# Patient Record
Sex: FEMALE | Race: BLACK OR AFRICAN AMERICAN | Employment: OTHER | URBAN - METROPOLITAN AREA
[De-identification: names, ages, dates, MRNs, and addresses within clinical notes are randomized per-mention and may not be internally consistent; named-entity substitution may affect disease eponyms.]

---

## 2017-02-16 ENCOUNTER — APPOINTMENT (OUTPATIENT)
Dept: GENERAL RADIOLOGY | Age: 47
End: 2017-02-16
Attending: EMERGENCY MEDICINE
Payer: MEDICAID

## 2017-02-16 ENCOUNTER — HOSPITAL ENCOUNTER (EMERGENCY)
Age: 47
Discharge: HOME | End: 2017-02-16
Attending: EMERGENCY MEDICINE
Payer: MEDICAID

## 2017-02-16 VITALS
DIASTOLIC BLOOD PRESSURE: 89 MMHG | TEMPERATURE: 98.7 F | HEART RATE: 82 BPM | SYSTOLIC BLOOD PRESSURE: 148 MMHG | OXYGEN SATURATION: 100 % | RESPIRATION RATE: 18 BRPM | BODY MASS INDEX: 50.84 KG/M2 | WEIGHT: 293 LBS

## 2017-02-16 DIAGNOSIS — A59.9 TRICHOMONAS INFECTION: ICD-10-CM

## 2017-02-16 DIAGNOSIS — J18.9 CAP (COMMUNITY ACQUIRED PNEUMONIA): Primary | ICD-10-CM

## 2017-02-16 LAB
ALBUMIN SERPL BCP-MCNC: 3.1 G/DL (ref 3.4–5)
ALBUMIN/GLOB SERPL: 0.7 {RATIO} (ref 0.8–1.7)
ALP SERPL-CCNC: 73 U/L (ref 45–117)
ALT SERPL-CCNC: 22 U/L (ref 13–56)
ANION GAP BLD CALC-SCNC: 9 MMOL/L (ref 3–18)
APPEARANCE UR: ABNORMAL
AST SERPL W P-5'-P-CCNC: 33 U/L (ref 15–37)
BACTERIA URNS QL MICRO: ABNORMAL /HPF
BASOPHILS # BLD AUTO: 0 K/UL (ref 0–0.06)
BASOPHILS # BLD: 0 % (ref 0–2)
BILIRUB SERPL-MCNC: 0.4 MG/DL (ref 0.2–1)
BILIRUB UR QL: NEGATIVE
BUN SERPL-MCNC: 20 MG/DL (ref 7–18)
BUN/CREAT SERPL: 10 (ref 12–20)
CALCIUM SERPL-MCNC: 8.4 MG/DL (ref 8.5–10.1)
CHLORIDE SERPL-SCNC: 103 MMOL/L (ref 100–108)
CO2 SERPL-SCNC: 26 MMOL/L (ref 21–32)
COLOR UR: ABNORMAL
CREAT SERPL-MCNC: 2.04 MG/DL (ref 0.6–1.3)
DIFFERENTIAL METHOD BLD: ABNORMAL
EOSINOPHIL # BLD: 0 K/UL (ref 0–0.4)
EOSINOPHIL NFR BLD: 1 % (ref 0–5)
EPITH CASTS URNS QL MICRO: ABNORMAL /LPF (ref 0–5)
ERYTHROCYTE [DISTWIDTH] IN BLOOD BY AUTOMATED COUNT: 16.4 % (ref 11.6–14.5)
GLOBULIN SER CALC-MCNC: 4.5 G/DL (ref 2–4)
GLUCOSE SERPL-MCNC: 102 MG/DL (ref 74–99)
GLUCOSE UR STRIP.AUTO-MCNC: NEGATIVE MG/DL
HCT VFR BLD AUTO: 31.5 % (ref 35–45)
HGB BLD-MCNC: 10.2 G/DL (ref 12–16)
HGB UR QL STRIP: ABNORMAL
KETONES UR QL STRIP.AUTO: ABNORMAL MG/DL
LEUKOCYTE ESTERASE UR QL STRIP.AUTO: ABNORMAL
LYMPHOCYTES # BLD AUTO: 28 % (ref 21–52)
LYMPHOCYTES # BLD: 1 K/UL (ref 0.9–3.6)
MCH RBC QN AUTO: 21.7 PG (ref 24–34)
MCHC RBC AUTO-ENTMCNC: 32.4 G/DL (ref 31–37)
MCV RBC AUTO: 67.2 FL (ref 74–97)
MONOCYTES # BLD: 0.4 K/UL (ref 0.05–1.2)
MONOCYTES NFR BLD AUTO: 12 % (ref 3–10)
NEUTS SEG # BLD: 2.1 K/UL (ref 1.8–8)
NEUTS SEG NFR BLD AUTO: 59 % (ref 40–73)
NITRITE UR QL STRIP.AUTO: NEGATIVE
PH UR STRIP: 5.5 [PH] (ref 5–8)
PLATELET # BLD AUTO: 185 K/UL (ref 135–420)
POTASSIUM SERPL-SCNC: 3.8 MMOL/L (ref 3.5–5.5)
PROT SERPL-MCNC: 7.6 G/DL (ref 6.4–8.2)
PROT UR STRIP-MCNC: 300 MG/DL
RBC # BLD AUTO: 4.69 M/UL (ref 4.2–5.3)
RBC #/AREA URNS HPF: ABNORMAL /HPF (ref 0–5)
SODIUM SERPL-SCNC: 138 MMOL/L (ref 136–145)
SP GR UR REFRACTOMETRY: 1.02 (ref 1–1.03)
TRICHOMONAS UR QL MICRO: ABNORMAL
UROBILINOGEN UR QL STRIP.AUTO: 0.2 EU/DL (ref 0.2–1)
WBC # BLD AUTO: 3.6 K/UL (ref 4.6–13.2)
WBC URNS QL MICRO: ABNORMAL /HPF (ref 0–4)

## 2017-02-16 PROCEDURE — 80053 COMPREHEN METABOLIC PANEL: CPT | Performed by: EMERGENCY MEDICINE

## 2017-02-16 PROCEDURE — 74011250636 HC RX REV CODE- 250/636: Performed by: EMERGENCY MEDICINE

## 2017-02-16 PROCEDURE — 71020 XR CHEST PA LAT: CPT

## 2017-02-16 PROCEDURE — 81001 URINALYSIS AUTO W/SCOPE: CPT | Performed by: EMERGENCY MEDICINE

## 2017-02-16 PROCEDURE — 74011250637 HC RX REV CODE- 250/637: Performed by: EMERGENCY MEDICINE

## 2017-02-16 PROCEDURE — 87086 URINE CULTURE/COLONY COUNT: CPT | Performed by: EMERGENCY MEDICINE

## 2017-02-16 PROCEDURE — 85025 COMPLETE CBC W/AUTO DIFF WBC: CPT | Performed by: EMERGENCY MEDICINE

## 2017-02-16 PROCEDURE — 99283 EMERGENCY DEPT VISIT LOW MDM: CPT

## 2017-02-16 RX ORDER — LEVOFLOXACIN 750 MG/1
750 TABLET ORAL DAILY
Qty: 5 TAB | Refills: 0 | Status: SHIPPED | OUTPATIENT
Start: 2017-02-16 | End: 2017-02-21

## 2017-02-16 RX ORDER — METRONIDAZOLE 250 MG/1
2000 TABLET ORAL ONCE
Status: COMPLETED | OUTPATIENT
Start: 2017-02-16 | End: 2017-02-16

## 2017-02-16 RX ORDER — VENLAFAXINE HYDROCHLORIDE 150 MG/1
CAPSULE, EXTENDED RELEASE ORAL DAILY
COMMUNITY

## 2017-02-16 RX ORDER — LEVOFLOXACIN 750 MG/1
750 TABLET ORAL
Status: COMPLETED | OUTPATIENT
Start: 2017-02-16 | End: 2017-02-16

## 2017-02-16 RX ADMIN — METRONIDAZOLE 2000 MG: 250 TABLET ORAL at 12:47

## 2017-02-16 RX ADMIN — SODIUM CHLORIDE 1000 ML: 900 INJECTION, SOLUTION INTRAVENOUS at 10:55

## 2017-02-16 RX ADMIN — LEVOFLOXACIN 750 MG: 750 TABLET, FILM COATED ORAL at 12:47

## 2017-02-16 NOTE — DISCHARGE INSTRUCTIONS
Pneumonia: Care Instructions  Your Care Instructions    Pneumonia is an infection of the lungs. Most cases are caused by infections from bacteria or viruses. Pneumonia may be mild or very severe. If it is caused by bacteria, you will be treated with antibiotics. It may take a few weeks to a few months to recover fully from pneumonia, depending on how sick you were and whether your overall health is good. Follow-up care is a key part of your treatment and safety. Be sure to make and go to all appointments, and call your doctor if you are having problems. Its also a good idea to know your test results and keep a list of the medicines you take. How can you care for yourself at home? · Take your antibiotics exactly as directed. Do not stop taking the medicine just because you are feeling better. You need to take the full course of antibiotics. · Take your medicines exactly as prescribed. Call your doctor if you think you are having a problem with your medicine. · Get plenty of rest and sleep. You may feel weak and tired for a while, but your energy level will improve with time. · To prevent dehydration, drink plenty of fluids, enough so that your urine is light yellow or clear like water. Choose water and other caffeine-free clear liquids until you feel better. If you have kidney, heart, or liver disease and have to limit fluids, talk with your doctor before you increase the amount of fluids you drink. · Take care of your cough so you can rest. A cough that brings up mucus from your lungs is common with pneumonia. It is one way your body gets rid of the infection. But if coughing keeps you from resting or causes severe fatigue and chest-wall pain, talk to your doctor. He or she may suggest that you take a medicine to reduce the cough. · Use a vaporizer or humidifier to add moisture to your bedroom. Follow the directions for cleaning the machine. · Do not smoke or allow others to smoke around you.  Smoke will make your cough last longer. If you need help quitting, talk to your doctor about stop-smoking programs and medicines. These can increase your chances of quitting for good. · Take an over-the-counter pain medicine, such as acetaminophen (Tylenol), ibuprofen (Advil, Motrin), or naproxen (Aleve). Read and follow all instructions on the label. · Do not take two or more pain medicines at the same time unless the doctor told you to. Many pain medicines have acetaminophen, which is Tylenol. Too much acetaminophen (Tylenol) can be harmful. · If you were given a spirometer to measure how well your lungs are working, use it as instructed. This can help your doctor tell how your recovery is going. · To prevent pneumonia in the future, talk to your doctor about getting a flu vaccine (once a year) and a pneumococcal vaccine (one time only for most people). When should you call for help? Call 911 anytime you think you may need emergency care. For example, call if:  · You have severe trouble breathing. Call your doctor now or seek immediate medical care if:  · You cough up dark brown or bloody mucus (sputum). · You have new or worse trouble breathing. · You are dizzy or lightheaded, or you feel like you may faint. Watch closely for changes in your health, and be sure to contact your doctor if:  · You have a new or higher fever. · You are coughing more deeply or more often. · You are not getting better after 2 days (48 hours). · You do not get better as expected. Where can you learn more? Go to http://marty-chente.info/. Enter [id number] in the search box to learn more about \"Pneumonia: Care Instructions. \"  Current as of: May 23, 2016  Content Version: 11.1  © 4366-8618 [employer]. Care instructions adapted under license by [employer] (which disclaims liability or warranty for this information).  If you have questions about a medical condition or this instruction, always ask your healthcare professional. [place] [address] any warranty or liability for your use of this information.

## 2017-02-16 NOTE — ED PROVIDER NOTES
HPI Comments: 9:41 AM Linda Jacobson is a 48 y.o. female with a history of HTN, Diabetes, Thyroid disorder, and Sarcoidosis who presents to the emergency department c/o headaches onset 4 days ago. The patient explains she has been fleeing \"awful\" lately and states her daughter is sick with similar symptoms. The pt reports associated symptoms of generalized myalgia, dizziness, subjective fever, cough, sore throat, and congestion. Pt denies chest pain, abdominal pain, and N/V/D. No other concerns at this time. PCP: Ame Paez MD      The history is provided by the patient. Past Medical History:   Diagnosis Date    Anemia     Bone lesion     Depressed     Diabetes ([facility]     Hypertension     Kidney problem     Sarcoidosis ([facility]     Thyroid disorder     Uterus problem        Past Surgical History:   Procedure Laterality Date    Hx  section      Hx tubal ligation      Hx other surgical       neck biopsy         Family History:   Problem Relation Age of Onset    Hypertension Mother     Diabetes Mother        Social History     Social History    Marital status: SINGLE     Spouse name: N/A    Number of children: N/A    Years of education: N/A     Occupational History    Not on file. Social History Main Topics    Smoking status: Former Smoker    Smokeless tobacco: Never Used    Alcohol use Yes      Comment: beer rarely    Drug use: No    Sexual activity: Not on file     Other Topics Concern    Not on file     Social History Narrative         ALLERGIES: Review of patient's allergies indicates no known allergies. Review of Systems   Constitutional: Positive for fever. HENT: Positive for congestion and sore throat. Negative for trouble swallowing. Respiratory: Positive for cough. Negative for shortness of breath. Cardiovascular: Negative for chest pain. Gastrointestinal: Negative for abdominal pain, diarrhea, nausea and vomiting.    Genitourinary: Negative for difficulty urinating. Musculoskeletal: Positive for myalgias (generalized). Negative for back pain and neck pain. Skin: Negative for wound. Neurological: Positive for dizziness and headaches. Negative for syncope. Psychiatric/Behavioral: Negative for behavioral problems. All other systems reviewed and are negative. Vitals:    02/16/17 0841 02/16/17 1025   BP: 122/77    Pulse: (!) 55    Resp: 17    Temp: 99 °F (37.2 °C)    SpO2: 96% 96%   Weight: 142.9 kg (315 lb)             Physical Exam   Constitutional: She is oriented to person, place, and time. She appears well-developed. No distress. Generally well-appearing, nad   HENT:   Head: Normocephalic and atraumatic. Eyes: EOM are normal.   Neck: Normal range of motion. Cardiovascular: Normal rate. Pulmonary/Chest: Effort normal and breath sounds normal. No respiratory distress. Abdominal: Soft. There is no tenderness. Musculoskeletal: Normal range of motion. Mechanically stable   Neurological: She is alert and oriented to person, place, and time. No focal deficits noted   Psychiatric: Her behavior is normal.   Nursing note and vitals reviewed. MDM  Number of Diagnoses or Management Options  CAP (community acquired pneumonia):   Trichomonas infection:   Diagnosis management comments: 47 yo AAF with PMHx HTN presents with several days not feeling well - fever, cough, body aches. Examination unremarkable. Pt likely with uri, will evaluate for acute process. 1.  cxr  2. Cbc, cmp  3. Ua, preg  4. IVF  5. Symptom management  6. Re-evaluate    12:15 PM  cxr suspicous for pneumonia, will treat. ua questionable uti, will culture. ua also with trich, will treat. Discussed results with pt and poc for dc home, abx, safer sex practices including no sexual activity until sexual partners are tested and treated, symptom management, follow-up, return precautions.        Amount and/or Complexity of Data Reviewed  Clinical lab tests: ordered and reviewed  Tests in the radiology section of CPT®: ordered and reviewed  Review and summarize past medical records: yes  Independent visualization of images, tracings, or specimens: yes    Patient Progress  Patient progress: stable    ED Course       Procedures      Vitals:  Patient Vitals for the past 12 hrs:   Temp Pulse Resp BP SpO2   02/16/17 1025 - - - - 96 %   02/16/17 0841 99 °F (37.2 °C) (!) 55 17 122/77 96 %     96% on RA, indicating adequate oxygenation. Medications ordered:   Medications   levoFLOXacin (LEVAQUIN) tablet 750 mg (not administered)   metroNIDAZOLE (FLAGYL) tablet 2,000 mg (not administered)   sodium chloride 0.9 % bolus infusion 1,000 mL (0 mL IntraVENous IV Completed 2/16/17 1100)         Lab findings:  Recent Results (from the past 12 hour(s))   CBC WITH AUTOMATED DIFF    Collection Time: 02/16/17  9:55 AM   Result Value Ref Range    WBC 3.6 (L) 4.6 - 13.2 K/uL    RBC 4.69 4.20 - 5.30 M/uL    HGB 10.2 (L) 12.0 - 16.0 g/dL    HCT 31.5 (L) 35.0 - 45.0 %    MCV 67.2 (L) 74.0 - 97.0 FL    MCH 21.7 (L) 24.0 - 34.0 PG    MCHC 32.4 31.0 - 37.0 g/dL    RDW 16.4 (H) 11.6 - 14.5 %    PLATELET 501 382 - 686 K/uL    NEUTROPHILS 59 40 - 73 %    LYMPHOCYTES 28 21 - 52 %    MONOCYTES 12 (H) 3 - 10 %    EOSINOPHILS 1 0 - 5 %    BASOPHILS 0 0 - 2 %    ABS. NEUTROPHILS 2.1 1.8 - 8.0 K/UL    ABS. LYMPHOCYTES 1.0 0.9 - 3.6 K/UL    ABS. MONOCYTES 0.4 0.05 - 1.2 K/UL    ABS. EOSINOPHILS 0.0 0.0 - 0.4 K/UL    ABS.  BASOPHILS 0.0 0.0 - 0.06 K/UL    DF AUTOMATED     METABOLIC PANEL, COMPREHENSIVE    Collection Time: 02/16/17  9:55 AM   Result Value Ref Range    Sodium 138 136 - 145 mmol/L    Potassium 3.8 3.5 - 5.5 mmol/L    Chloride 103 100 - 108 mmol/L    CO2 26 21 - 32 mmol/L    Anion gap 9 3.0 - 18 mmol/L    Glucose 102 (H) 74 - 99 mg/dL    BUN 20 (H) 7.0 - 18 MG/DL    Creatinine 2.04 (H) 0.6 - 1.3 MG/DL    BUN/Creatinine ratio 10 (L) 12 - 20      GFR est AA 32 (L) >60 ml/min/1.73m2    GFR est non-AA 26 (L) >60 ml/min/1.73m2    Calcium 8.4 (L) 8.5 - 10.1 MG/DL    Bilirubin, total 0.4 0.2 - 1.0 MG/DL    ALT (SGPT) 22 13 - 56 U/L    AST (SGOT) 33 15 - 37 U/L    Alk. phosphatase 73 45 - 117 U/L    Protein, total 7.6 6.4 - 8.2 g/dL    Albumin 3.1 (L) 3.4 - 5.0 g/dL    Globulin 4.5 (H) 2.0 - 4.0 g/dL    A-G Ratio 0.7 (L) 0.8 - 1.7     URINALYSIS W/ RFLX MICROSCOPIC    Collection Time: 02/16/17 10:20 AM   Result Value Ref Range    Color DARK YELLOW      Appearance CLOUDY      Specific gravity 1.019 1.005 - 1.030      pH (UA) 5.5 5.0 - 8.0      Protein 300 (A) NEG mg/dL    Glucose NEGATIVE  NEG mg/dL    Ketone TRACE (A) NEG mg/dL    Bilirubin NEGATIVE  NEG      Blood LARGE (A) NEG      Urobilinogen 0.2 0.2 - 1.0 EU/dL    Nitrites NEGATIVE  NEG      Leukocyte Esterase TRACE (A) NEG     URINE MICROSCOPIC ONLY    Collection Time: 02/16/17 10:20 AM   Result Value Ref Range    WBC 4 to 10 0 - 4 /hpf    RBC TOO NUMEROUS TO COUNT 0 - 5 /hpf    Epithelial cells 1+ 0 - 5 /lpf    Bacteria 3+ (A) NEG /hpf    Trichomonas 1+ (A) NEG           X-Ray, CT or other radiology findings or impressions:  XR CHEST PA LAT   Final Result   IMPRESSION:     1. Cardiomegaly with nonspecific right lower lung airspace disease representing  pneumonia/atelectasis. Reevaluation of patient:     I have reassessed the patient. Patient was discharged in stable condition. Patient is to return to emergency department if any new or worsening condition. Disposition:  Diagnosis:   1. CAP (community acquired pneumonia)    2.  Trichomonas infection        Disposition: Discharged    Follow-up Information     Follow up With Details Comments Contact Info    Elisabet Rockwell MD Schedule an appointment as soon as possible for a visit ED visit follow up [address] EMERGENCY DEPT Go to As needed, If symptoms worsen [address]  [phone]            Patient's Medications   Start Taking    LEVOFLOXACIN (LEVAQUIN) 750 MG TABLET    Take 1 Tab by mouth daily for 5 doses. Continue Taking    ACYCLOVIR PO    Take 1 Tab by mouth daily. LABETALOL (NORMODYNE) 200 MG TABLET    Take 300 mg by mouth two (2) times a day. NIFEDIPINE CR (ADALAT CC) 90 MG CR TABLET    Take 90 mg by mouth daily. SIMVASTATIN (ZOCOR) 20 MG TABLET    Take 20 mg by mouth nightly. VENLAFAXINE-SR (EFFEXOR-XR) 150 MG CAPSULE    Take  by mouth daily. These Medications have changed    No medications on file   Stop Taking    MEDROXYPROGESTERONE (PROVERA) 10 MG TABLET    Take 1 Tab by mouth two (2) times a day. PREDNISONE (DELTASONE) 5 MG TABLET    Take 2.5 mg by mouth daily. SERTRALINE (ZOLOFT) 100 MG TABLET    Take 100 mg by mouth daily. Scribe Attestation  Bel Mcghee scribing for and in presence of José Miguel Harris MD (02/16/17)      Physician Attestation  I personally preformed the services described in this documentation, reviewed and edited the documentation which was dictated to the scribe in my presence, and it accurately records my words and actions.      José Miguel Harris MD (02/16/17)      Signed by: Benton Jacobsen, 02/16/17, 9:39 AM

## 2017-02-16 NOTE — ED NOTES
Purposeful rounding completed:    Side rails up x 1:  YES   Bed low and wheels and locked: YES   Call bell in reach: YES   Comfort addressed: YES     Toileting needs addressed: YES   Plan of care reviewed/updated with patient and or family members: YES   IV site assessed: YES  Pain assessed and addressed: Liliana Caro

## 2017-02-17 LAB
BACTERIA SPEC CULT: ABNORMAL
BACTERIA SPEC CULT: ABNORMAL
SERVICE CMNT-IMP: ABNORMAL

## 2017-06-10 ENCOUNTER — APPOINTMENT (OUTPATIENT)
Dept: GENERAL RADIOLOGY | Age: 47
End: 2017-06-10
Attending: NURSE PRACTITIONER
Payer: MEDICAID

## 2017-06-10 ENCOUNTER — HOSPITAL ENCOUNTER (EMERGENCY)
Age: 47
Discharge: HOME | End: 2017-06-10
Attending: EMERGENCY MEDICINE | Admitting: EMERGENCY MEDICINE
Payer: MEDICAID

## 2017-06-10 VITALS
WEIGHT: 293 LBS | BODY MASS INDEX: 48.82 KG/M2 | DIASTOLIC BLOOD PRESSURE: 75 MMHG | RESPIRATION RATE: 19 BRPM | HEART RATE: 72 BPM | SYSTOLIC BLOOD PRESSURE: 128 MMHG | TEMPERATURE: 98.8 F | OXYGEN SATURATION: 94 % | HEIGHT: 65 IN

## 2017-06-10 DIAGNOSIS — R00.2 PALPITATIONS: Primary | ICD-10-CM

## 2017-06-10 LAB
ANION GAP BLD CALC-SCNC: 8 MMOL/L (ref 3–18)
ATRIAL RATE: 103 BPM
BASOPHILS # BLD AUTO: 0 K/UL (ref 0–0.06)
BASOPHILS # BLD: 0 % (ref 0–2)
BUN SERPL-MCNC: 16 MG/DL (ref 7–18)
BUN/CREAT SERPL: 10 (ref 12–20)
CALCIUM SERPL-MCNC: 9.1 MG/DL (ref 8.5–10.1)
CALCULATED P AXIS, ECG09: 61 DEGREES
CALCULATED R AXIS, ECG10: -51 DEGREES
CALCULATED T AXIS, ECG11: 88 DEGREES
CHLORIDE SERPL-SCNC: 105 MMOL/L (ref 100–108)
CK MB CFR SERPL CALC: 0.5 % (ref 0–4)
CK MB SERPL-MCNC: 1.4 NG/ML (ref 5–25)
CK SERPL-CCNC: 280 U/L (ref 26–192)
CO2 SERPL-SCNC: 27 MMOL/L (ref 21–32)
CREAT SERPL-MCNC: 1.56 MG/DL (ref 0.6–1.3)
DIAGNOSIS, 93000: NORMAL
DIFFERENTIAL METHOD BLD: ABNORMAL
EOSINOPHIL # BLD: 0.4 K/UL (ref 0–0.4)
EOSINOPHIL NFR BLD: 6 % (ref 0–5)
ERYTHROCYTE [DISTWIDTH] IN BLOOD BY AUTOMATED COUNT: 18.5 % (ref 11.6–14.5)
GLUCOSE SERPL-MCNC: 119 MG/DL (ref 74–99)
HCT VFR BLD AUTO: 30.2 % (ref 35–45)
HGB BLD-MCNC: 9.5 G/DL (ref 12–16)
LYMPHOCYTES # BLD AUTO: 16 % (ref 21–52)
LYMPHOCYTES # BLD: 0.9 K/UL (ref 0.9–3.6)
MCH RBC QN AUTO: 20.9 PG (ref 24–34)
MCHC RBC AUTO-ENTMCNC: 31.5 G/DL (ref 31–37)
MCV RBC AUTO: 66.4 FL (ref 74–97)
MONOCYTES # BLD: 0.5 K/UL (ref 0.05–1.2)
MONOCYTES NFR BLD AUTO: 8 % (ref 3–10)
NEUTS SEG # BLD: 4.1 K/UL (ref 1.8–8)
NEUTS SEG NFR BLD AUTO: 70 % (ref 40–73)
P-R INTERVAL, ECG05: 186 MS
PLATELET # BLD AUTO: 214 K/UL (ref 135–420)
POTASSIUM SERPL-SCNC: 3.6 MMOL/L (ref 3.5–5.5)
Q-T INTERVAL, ECG07: 354 MS
QRS DURATION, ECG06: 110 MS
QTC CALCULATION (BEZET), ECG08: 463 MS
RBC # BLD AUTO: 4.55 M/UL (ref 4.2–5.3)
SODIUM SERPL-SCNC: 140 MMOL/L (ref 136–145)
TROPONIN I SERPL-MCNC: <0.02 NG/ML (ref 0–0.04)
TSH SERPL DL<=0.05 MIU/L-ACNC: 2.49 UIU/ML (ref 0.36–3.74)
VENTRICULAR RATE, ECG03: 103 BPM
WBC # BLD AUTO: 5.9 K/UL (ref 4.6–13.2)

## 2017-06-10 PROCEDURE — 94762 N-INVAS EAR/PLS OXIMTRY CONT: CPT

## 2017-06-10 PROCEDURE — 80048 BASIC METABOLIC PNL TOTAL CA: CPT | Performed by: NURSE PRACTITIONER

## 2017-06-10 PROCEDURE — 85025 COMPLETE CBC W/AUTO DIFF WBC: CPT | Performed by: NURSE PRACTITIONER

## 2017-06-10 PROCEDURE — 84443 ASSAY THYROID STIM HORMONE: CPT | Performed by: NURSE PRACTITIONER

## 2017-06-10 PROCEDURE — 71010 XR CHEST PORT: CPT

## 2017-06-10 PROCEDURE — 93005 ELECTROCARDIOGRAM TRACING: CPT

## 2017-06-10 PROCEDURE — 82550 ASSAY OF CK (CPK): CPT | Performed by: NURSE PRACTITIONER

## 2017-06-10 PROCEDURE — 99285 EMERGENCY DEPT VISIT HI MDM: CPT

## 2017-06-10 RX ORDER — ACYCLOVIR 400 MG/1
400 TABLET ORAL DAILY
COMMUNITY

## 2017-06-10 RX ORDER — FERROUS SULFATE 324(65)MG
324 TABLET, DELAYED RELEASE (ENTERIC COATED) ORAL
COMMUNITY

## 2017-06-10 NOTE — ED NOTES
Purposeful rounding completed:    Side rails up x 2:  YES  Bed in low position and wheels locked: YES  Call bell within reach: YES  Comfort addressed: YES    Toileting needs addressed: YES  Plan of care reviewed/updated with patient and or family members: YES  IV site assessed: YES  Pain assessed and addressed: YES, 0    Patient sleeping, lights off

## 2017-06-10 NOTE — DISCHARGE INSTRUCTIONS
Palpitations: Care Instructions  Your Care Instructions    Heart palpitations are the uncomfortable sensation that your heart is beating fast or irregularly. You might feel pounding or fluttering in your chest. It might feel like your heart is skipping a beat. Although palpitations may be caused by a heart problem, they also occur because of stress, fatigue, or use of alcohol, caffeine, or nicotine. Many medicines, including diet pills, antihistamines, decongestants, and some herbal products, can cause heart palpitations. Nearly everyone has palpitations from time to time. Depending on your symptoms, your doctor may need to do more tests to try to find the cause of your palpitations. Follow-up care is a key part of your treatment and safety. Be sure to make and go to all appointments, and call your doctor if you are having problems. It's also a good idea to know your test results and keep a list of the medicines you take. How can you care for yourself at home? · Avoid caffeine, nicotine, and excess alcohol. · Do not take illegal drugs, such as methamphetamines and cocaine. · Do not take weight loss or diet medicines unless you talk with your doctor first.  · Get plenty of sleep. · Do not overeat. · If you have palpitations again, take deep breaths and try to relax. This may slow a racing heart. · If you start to feel lightheaded, lie down to avoid injuries that might result if you pass out and fall down. · Keep a record of your palpitations and bring it to your next doctor's appointment. Write down:  ¨ The date and time. ¨ Your pulse. (If your heart is beating fast, it may be hard to count your pulse.)  ¨ What you were doing when the palpitations started. ¨ How long the palpitations lasted. ¨ Any other symptoms. · If an activity causes palpitations, slow down or stop. Talk to your doctor before you do that activity again. · Take your medicines exactly as prescribed.  Call your doctor if you think you are having a problem with your medicine. When should you call for help? Call 911 anytime you think you may need emergency care. For example, call if:  · You passed out (lost consciousness). · You have symptoms of a heart attack. These may include:  ¨ Chest pain or pressure, or a strange feeling in the chest.  ¨ Sweating. ¨ Shortness of breath. ¨ Pain, pressure, or a strange feeling in the back, neck, jaw, or upper belly or in one or both shoulders or arms. ¨ Lightheadedness or sudden weakness. ¨ A fast or irregular heartbeat. After you call 911, the  may tell you to chew 1 adult-strength or 2 to 4 low-dose aspirin. Wait for an ambulance. Do not try to drive yourself. · You have symptoms of a stroke. These may include:  ¨ Sudden numbness, tingling, weakness, or loss of movement in your face, arm, or leg, especially on only one side of your body. ¨ Sudden vision changes. ¨ Sudden trouble speaking. ¨ Sudden confusion or trouble understanding simple statements. ¨ Sudden problems with walking or balance. ¨ A sudden, severe headache that is different from past headaches. Call your doctor now or seek immediate medical care if:  · You have heart palpitations and:  ¨ Are dizzy or lightheaded, or you feel like you may faint. ¨ Have new or increased shortness of breath. Watch closely for changes in your health, and be sure to contact your doctor if:  · You continue to have heart palpitations. Where can you learn more? Go to http://marty-chente.info/. Enter R508 in the search box to learn more about \"Palpitations: Care Instructions. \"  Current as of: January 27, 2016  Content Version: 11.2  © 7282-0244 [employer]. Care instructions adapted under license by [employer] (which disclaims liability or warranty for this information).  If you have questions about a medical condition or this instruction, always ask your healthcare professional. [employer] disclaims any warranty or liability for your use of this information. MyChart Activation    Thank you for requesting access to Client24. Please follow the instructions below to securely access and download your online medical record. Client24 allows you to send messages to your doctor, view your test results, renew your prescriptions, schedule appointments, and more. How Do I Sign Up? 1. In your internet browser, go to [email or web address]  2. Click on the First Time User? Click Here link in the Sign In box. You will be redirect to the New Member Sign Up page. 3. Enter your Client24 Access Code exactly as it appears below. You will not need to use this code after youve completed the sign-up process. If you do not sign up before the expiration date, you must request a new code. Client24 Access Code: [email or web address]  Expires: 2017  8:01 AM (This is the date your Client24 access code will )    4. Enter the last four digits of your Social Security Number (xxxx) and Date of Birth (mm/dd/yyyy) as indicated and click Submit. You will be taken to the next sign-up page. 5. Create a Client24 ID. This will be your Client24 login ID and cannot be changed, so think of one that is secure and easy to remember. 6. Create a Client24 password. You can change your password at any time. 7. Enter your Password Reset Question and Answer. This can be used at a later time if you forget your password. 8. Enter your e-mail address. You will receive e-mail notification when new information is available in [address] 9. Click Sign Up. You can now view and download portions of your medical record. 10. Click the Download Summary menu link to download a portable copy of your medical information. Additional Information    If you have questions, please visit the Frequently Asked Questions section of the Client24 website at [email or web address] Remember, Client24 is NOT to be used for urgent needs. For medical emergencies, dial 911. Keep well hydrated. Drink at least 2 liters of water daily. Follow up with the Cardiology referral as provided. If your symptoms worsen, return to the ER at once for re-evaluation.

## 2017-06-10 NOTE — ED PROVIDER NOTES
HPI Comments: Shilo Parra is a 50year old female who was at home this morning,  She noted that her heart was racing fast, and \"decided to come in and get checked out. \"   At the time of the interview, the Pt states she feels fine. She denies CP and SOB. Patient is a 50 y.o. female presenting with palpitations. The history is provided by the patient. History limited by: no communication barrier. Palpitations    This is a new problem. The current episode started 6 to 12 hours ago. The problem has not changed since onset. Associated with: Pt makes no association. She has tried nothing for the symptoms. Past medical history comments: Hx of Anxiety. .        Past Medical History:   Diagnosis Date    Anemia     Bone lesion     Depressed     Diabetes ([address]     Hypertension     Kidney problem     [facility]     Thyroid disorder     Uterus problem        Past Surgical History:   Procedure Laterality Date    HX  SECTION      HX OTHER SURGICAL      neck biopsy    HX TUBAL LIGATION           Family History:   Problem Relation Age of Onset    Hypertension Mother     Diabetes Mother        Social History     Social History    Marital status: SINGLE     Spouse name: N/A    Number of children: N/A    Years of education: N/A     Occupational History    Not on file. Social History Main Topics    Smoking status: Former Smoker    Smokeless tobacco: Never Used    Alcohol use Yes      Comment: beer rarely    Drug use: No    Sexual activity: Not on file     Other Topics Concern    Not on file     Social History Narrative         ALLERGIES: Review of patient's allergies indicates no known allergies. Review of Systems   Constitutional: Negative. HENT: Negative. Eyes: Negative. Respiratory: Negative. Cardiovascular: Positive for palpitations. Gastrointestinal: Negative. Endocrine: Negative. Genitourinary: Negative. Musculoskeletal: Negative. Skin: Negative. Allergic/Immunologic: Negative. Neurological: Negative. Hematological: Negative. Psychiatric/Behavioral: Negative. Vitals:    06/10/17 0631 06/10/17 0637 06/10/17 0645 06/10/17 0700   BP:    105/68   Pulse: (!) 105  88 83   Resp: 24  22 25   Temp:       SpO2: 98%  98% 94%   Weight:  149.7 kg (330 lb)     Height:  5' 5\" (1.651 m)              Physical Exam   Constitutional: She is oriented to person, place, and time. She appears well-developed and well-nourished. No distress. HENT:   Head: Normocephalic and atraumatic. Eyes: EOM are normal. Pupils are equal, round, and reactive to light. Neck: Normal range of motion. Neck supple. Cardiovascular: Normal rate, regular rhythm, normal heart sounds and intact distal pulses. Exam reveals no gallop and no friction rub. No murmur heard. Pulmonary/Chest: Effort normal and breath sounds normal. No respiratory distress. She has no wheezes. She has no rales. Abdominal: Soft. Bowel sounds are normal. There is no tenderness. There is no rebound and no guarding. Genitourinary:   Genitourinary Comments: NE   Musculoskeletal: Normal range of motion. She exhibits no edema, tenderness or deformity. Neurological: She is alert and oriented to person, place, and time. No cranial nerve deficit. Coordination normal.   Skin: Skin is warm and dry. Psychiatric: She has a normal mood and affect. Her behavior is normal.   Nursing note and vitals reviewed. MDM  Number of Diagnoses or Management Options  Palpitations:   Diagnosis management comments: PROGRESS NOTE:  The CXR was reviewed. There is no significant change from the CXR imaged on  02-16-17. Cardiomegaly. Atelectasis.    Sarahy Smiley NP  8:00 AM           Amount and/or Complexity of Data Reviewed  Clinical lab tests: ordered and reviewed  Tests in the radiology section of CPT®: ordered and reviewed    Risk of Complications, Morbidity, and/or Mortality  Presenting problems: low  Diagnostic procedures: low  Management options: low      ED Course       Procedures    Diagnosis:   1. Palpitations          Disposition:   Discharged to Home. Follow-up Information     Follow up With Details Comments Contact Info    Nupur Ruggiero MD Call on Monday for an appointment. Dre Keys MD Call on Monday for an appointment. [address] [place] [address]            Patient's Medications   Start Taking    No medications on file   Continue Taking    ACYCLOVIR (ZOVIRAX) 400 MG TABLET    Take 400 mg by mouth daily. FERROUS SULFATE 324 MG (65 MG IRON) TABLET    Take 324 mg by mouth Daily (before breakfast). LABETALOL (NORMODYNE) 200 MG TABLET    Take 300 mg by mouth two (2) times a day. NIFEDIPINE CR (ADALAT CC) 90 MG CR TABLET    Take 90 mg by mouth daily. SIMVASTATIN (ZOCOR) 20 MG TABLET    Take 20 mg by mouth nightly. VENLAFAXINE-SR (EFFEXOR-XR) 150 MG CAPSULE    Take  by mouth daily. These Medications have changed    No medications on file   Stop Taking    ACYCLOVIR PO    Take 1 Tab by mouth daily.

## 2017-06-10 NOTE — ED TRIAGE NOTES
Patient states she had a few minutes of rapid heart rate approx 1 hour PTA. Denies chest pain or shortness of breath at that time. Denies any symptoms at this time.

## 2017-08-26 ENCOUNTER — HOSPITAL ENCOUNTER (EMERGENCY)
Age: 47
Discharge: HOME | End: 2017-08-26
Attending: EMERGENCY MEDICINE
Payer: MEDICAID

## 2017-08-26 ENCOUNTER — APPOINTMENT (OUTPATIENT)
Dept: GENERAL RADIOLOGY | Age: 47
End: 2017-08-26
Attending: EMERGENCY MEDICINE
Payer: MEDICAID

## 2017-08-26 ENCOUNTER — APPOINTMENT (OUTPATIENT)
Dept: CT IMAGING | Age: 47
End: 2017-08-26
Attending: PHYSICIAN ASSISTANT
Payer: MEDICAID

## 2017-08-26 VITALS
HEIGHT: 65 IN | HEART RATE: 70 BPM | DIASTOLIC BLOOD PRESSURE: 89 MMHG | WEIGHT: 293 LBS | TEMPERATURE: 98.8 F | SYSTOLIC BLOOD PRESSURE: 160 MMHG | RESPIRATION RATE: 20 BRPM | BODY MASS INDEX: 48.82 KG/M2 | OXYGEN SATURATION: 98 %

## 2017-08-26 DIAGNOSIS — R60.9 PERIPHERAL EDEMA: ICD-10-CM

## 2017-08-26 DIAGNOSIS — J18.9 CAP (COMMUNITY ACQUIRED PNEUMONIA): Primary | ICD-10-CM

## 2017-08-26 DIAGNOSIS — R91.1 NODULE OF RIGHT LUNG: ICD-10-CM

## 2017-08-26 LAB
ALBUMIN SERPL-MCNC: 3.2 G/DL (ref 3.4–5)
ALBUMIN/GLOB SERPL: 0.7 {RATIO} (ref 0.8–1.7)
ALP SERPL-CCNC: 73 U/L (ref 45–117)
ALT SERPL-CCNC: 17 U/L (ref 13–56)
ANION GAP SERPL CALC-SCNC: 11 MMOL/L (ref 3–18)
APPEARANCE UR: CLEAR
AST SERPL-CCNC: 17 U/L (ref 15–37)
BACTERIA URNS QL MICRO: NEGATIVE /HPF
BASOPHILS # BLD: 0 K/UL (ref 0–0.06)
BASOPHILS NFR BLD: 0 % (ref 0–2)
BILIRUB DIRECT SERPL-MCNC: 0.1 MG/DL (ref 0–0.2)
BILIRUB SERPL-MCNC: 0.4 MG/DL (ref 0.2–1)
BILIRUB UR QL: NEGATIVE
BNP SERPL-MCNC: 781 PG/ML (ref 0–450)
BUN SERPL-MCNC: 12 MG/DL (ref 7–18)
BUN/CREAT SERPL: 8 (ref 12–20)
CALCIUM SERPL-MCNC: 9.2 MG/DL (ref 8.5–10.1)
CHLORIDE SERPL-SCNC: 103 MMOL/L (ref 100–108)
CK MB CFR SERPL CALC: 0.9 % (ref 0–4)
CK MB SERPL-MCNC: 1.6 NG/ML (ref 5–25)
CK SERPL-CCNC: 187 U/L (ref 26–192)
CO2 SERPL-SCNC: 27 MMOL/L (ref 21–32)
COLOR UR: YELLOW
CREAT SERPL-MCNC: 1.6 MG/DL (ref 0.6–1.3)
DIFFERENTIAL METHOD BLD: ABNORMAL
EOSINOPHIL # BLD: 0.3 K/UL (ref 0–0.4)
EOSINOPHIL NFR BLD: 6 % (ref 0–5)
EPITH CASTS URNS QL MICRO: NORMAL /LPF (ref 0–5)
ERYTHROCYTE [DISTWIDTH] IN BLOOD BY AUTOMATED COUNT: 19.1 % (ref 11.6–14.5)
GLOBULIN SER CALC-MCNC: 4.6 G/DL (ref 2–4)
GLUCOSE SERPL-MCNC: 120 MG/DL (ref 74–99)
GLUCOSE UR STRIP.AUTO-MCNC: NEGATIVE MG/DL
HCT VFR BLD AUTO: 31.4 % (ref 35–45)
HGB BLD-MCNC: 9.8 G/DL (ref 12–16)
HGB UR QL STRIP: NEGATIVE
KETONES UR QL STRIP.AUTO: NEGATIVE MG/DL
LEUKOCYTE ESTERASE UR QL STRIP.AUTO: NEGATIVE
LYMPHOCYTES # BLD: 1.3 K/UL (ref 0.9–3.6)
LYMPHOCYTES NFR BLD: 21 % (ref 21–52)
MAGNESIUM SERPL-MCNC: 2.2 MG/DL (ref 1.6–2.6)
MCH RBC QN AUTO: 20.9 PG (ref 24–34)
MCHC RBC AUTO-ENTMCNC: 31.2 G/DL (ref 31–37)
MCV RBC AUTO: 67.1 FL (ref 74–97)
MONOCYTES # BLD: 0.7 K/UL (ref 0.05–1.2)
MONOCYTES NFR BLD: 11 % (ref 3–10)
NEUTS SEG # BLD: 3.8 K/UL (ref 1.8–8)
NEUTS SEG NFR BLD: 62 % (ref 40–73)
NITRITE UR QL STRIP.AUTO: NEGATIVE
PH UR STRIP: 6.5 [PH] (ref 5–8)
PLATELET # BLD AUTO: 221 K/UL (ref 135–420)
POTASSIUM SERPL-SCNC: 3.4 MMOL/L (ref 3.5–5.5)
PROT SERPL-MCNC: 7.8 G/DL (ref 6.4–8.2)
PROT UR STRIP-MCNC: 30 MG/DL
RBC # BLD AUTO: 4.68 M/UL (ref 4.2–5.3)
RBC #/AREA URNS HPF: 0 /HPF (ref 0–5)
SODIUM SERPL-SCNC: 141 MMOL/L (ref 136–145)
SP GR UR REFRACTOMETRY: 1.01 (ref 1–1.03)
TROPONIN I SERPL-MCNC: <0.02 NG/ML (ref 0–0.04)
UROBILINOGEN UR QL STRIP.AUTO: 0.2 EU/DL (ref 0.2–1)
WBC # BLD AUTO: 6 K/UL (ref 4.6–13.2)
WBC URNS QL MICRO: NORMAL /HPF (ref 0–4)

## 2017-08-26 PROCEDURE — 82550 ASSAY OF CK (CPK): CPT | Performed by: EMERGENCY MEDICINE

## 2017-08-26 PROCEDURE — 83735 ASSAY OF MAGNESIUM: CPT | Performed by: EMERGENCY MEDICINE

## 2017-08-26 PROCEDURE — 74011000250 HC RX REV CODE- 250: Performed by: PHYSICIAN ASSISTANT

## 2017-08-26 PROCEDURE — 83880 ASSAY OF NATRIURETIC PEPTIDE: CPT | Performed by: EMERGENCY MEDICINE

## 2017-08-26 PROCEDURE — 94640 AIRWAY INHALATION TREATMENT: CPT

## 2017-08-26 PROCEDURE — 96366 THER/PROPH/DIAG IV INF ADDON: CPT

## 2017-08-26 PROCEDURE — 71250 CT THORAX DX C-: CPT

## 2017-08-26 PROCEDURE — 81001 URINALYSIS AUTO W/SCOPE: CPT | Performed by: PHYSICIAN ASSISTANT

## 2017-08-26 PROCEDURE — 80048 BASIC METABOLIC PNL TOTAL CA: CPT | Performed by: EMERGENCY MEDICINE

## 2017-08-26 PROCEDURE — 99285 EMERGENCY DEPT VISIT HI MDM: CPT

## 2017-08-26 PROCEDURE — 96365 THER/PROPH/DIAG IV INF INIT: CPT

## 2017-08-26 PROCEDURE — 85025 COMPLETE CBC W/AUTO DIFF WBC: CPT | Performed by: EMERGENCY MEDICINE

## 2017-08-26 PROCEDURE — 93005 ELECTROCARDIOGRAM TRACING: CPT

## 2017-08-26 PROCEDURE — 74011250636 HC RX REV CODE- 250/636: Performed by: PHYSICIAN ASSISTANT

## 2017-08-26 PROCEDURE — 71010 XR CHEST PORT: CPT

## 2017-08-26 PROCEDURE — 99284 EMERGENCY DEPT VISIT MOD MDM: CPT

## 2017-08-26 PROCEDURE — 77030029684 HC NEB SM VOL KT MONA -A

## 2017-08-26 PROCEDURE — 80076 HEPATIC FUNCTION PANEL: CPT | Performed by: PHYSICIAN ASSISTANT

## 2017-08-26 RX ORDER — LEVOFLOXACIN 750 MG/1
750 TABLET ORAL DAILY
Qty: 5 TAB | Refills: 0 | Status: SHIPPED | OUTPATIENT
Start: 2017-08-26 | End: 2018-07-28

## 2017-08-26 RX ORDER — LEVOFLOXACIN 5 MG/ML
750 INJECTION, SOLUTION INTRAVENOUS
Status: COMPLETED | OUTPATIENT
Start: 2017-08-26 | End: 2017-08-26

## 2017-08-26 RX ORDER — FUROSEMIDE 20 MG/1
20 TABLET ORAL DAILY
Qty: 10 TAB | Refills: 0 | Status: SHIPPED | OUTPATIENT
Start: 2017-08-26 | End: 2017-09-05

## 2017-08-26 RX ORDER — IPRATROPIUM BROMIDE AND ALBUTEROL SULFATE 2.5; .5 MG/3ML; MG/3ML
3 SOLUTION RESPIRATORY (INHALATION)
Status: COMPLETED | OUTPATIENT
Start: 2017-08-26 | End: 2017-08-26

## 2017-08-26 RX ADMIN — LEVOFLOXACIN 750 MG: 5 INJECTION, SOLUTION INTRAVENOUS at 18:56

## 2017-08-26 RX ADMIN — IPRATROPIUM BROMIDE AND ALBUTEROL SULFATE 3 ML: .5; 3 SOLUTION RESPIRATORY (INHALATION) at 16:37

## 2017-08-26 NOTE — ED NOTES
Report received from Marilou Sarmiento, [place]. Pt resting comfortably on the stretcher in no apparent distress. Patient provided with a blanket and ambulatory to the restroom.

## 2017-08-26 NOTE — ED NOTES
7:22 PM  Received patient report from [place], [place] off going mid level. Assumed care. Awaiting CT results of chest due to cxr showing possible infiltrate. Patient afebrile, and not tachycardic on exam with no complaints. Introduced self and assessed patient. Discussed patient with Dr. Obdulio Johnson. Regarding ct results, will plan on levaquin IV for infiltrate, and have follow up with pulmonologist regarding prior granulomatous infection, and 5.5 mm nodule in right lower lobe. Discussed results with pt as well. All questions answered and patient in agreement with plan of care. Will plan for discharge.   Essence Davenport PA-C

## 2017-08-26 NOTE — ED PROVIDER NOTES
Patient is a 50 y.o. female presenting with side pain, foot swelling, and frequency. The history is provided by the patient. Side Pain    Associated symptoms include frequency. Foot Swelling      Urinary Frequency    Associated symptoms include frequency. Earnest Kumar is a 50 y.o. female with history of Anemia, Diabetes, HTN, Kidney problem, Sarcoidosis, Thyroid disorder presents with c/o lower leg swelling, pain, increased frequency of urination for the past several weeks. States is feeling better has not been to PCP in some time. Denies fever. Has some SOB with exertion. Past Medical History:   Diagnosis Date    Anemia     Bone lesion     Depressed     Diabetes ([address]     Hypertension     Kidney problem     [facility]osis [facility] [address]     Thyroid disorder     Uterus problem        Past Surgical History:   Procedure Laterality Date    HX  SECTION      HX OTHER SURGICAL      neck biopsy    HX TUBAL LIGATION           Family History:   Problem Relation Age of Onset    Hypertension Mother     Diabetes Mother        Social History     Social History    Marital status: SINGLE     Spouse name: N/A    Number of children: N/A    Years of education: N/A     Occupational History    Not on file. Social History Main Topics    Smoking status: Former Smoker    Smokeless tobacco: Never Used    Alcohol use Yes      Comment: beer rarely    Drug use: No    Sexual activity: Not on file     Other Topics Concern    Not on file     Social History Narrative         ALLERGIES: Review of patient's allergies indicates no known allergies. Review of Systems   Genitourinary: Positive for frequency. Constitutional:  Denies malaise, fever, chills. Head:  Denies injury. Face:  Denies injury or pain. ENMT:  Denies sore throat. Neck:  Denies injury or pain. Chest:  Denies injury. Cardiac:  Denies chest pain or palpitations. Respiratory:  shortness of breath.   Denies cough, wheezing, difficulty breathing. GI/ABD:  Denies injury, pain, distention, nausea, vomiting, diarrhea. :  urgency. Denies injury, pain, dysuria   Back:  Denies injury or pain. Pelvis:  Denies injury or pain. Extremity/MS:  Leg swelling, pain  Neuro:  Denies headache, LOC, dizziness, neurologic symptoms/deficits/paresthesias. Skin: Denies injury, rash, itching or skin changes. Vitals:    08/26/17 1506   BP: (!) 173/111   Pulse: 81   Resp: 18   Temp: 98.8 °F (37.1 °C)   SpO2: 95%   Weight: 154.2 kg (340 lb)   Height: 5' 5\" (1.651 m)            Physical Exam   Nursing note and vitals reviewed. CONSTITUTIONAL: Alert, in no apparent distress; well-developed; well-nourished. HEAD:  Normocephalic, atraumatic. EYES: PERRL; EOM's intact. ENTM: Nose: No rhinorrhea; Throat: mucous membranes moist. Posterior pharynx-normal.  Neck:  No JVD, supple without lymphadenopathy. RESP: Chest with mild rhonchi, wheezing, equal breath sounds. CV: S1 and S2 WNL; No murmurs, gallops or rubs. GI: Abdomen soft and non-tender. No masses or organomegaly. UPPER EXT:  Normal inspection. LOWER EXT: Bilat lower leg edema, swelling, some areas of excoriation with scabbing., good cap refill  NEURO: strength 5/5 and sym, sensation intact. SKIN: No rashes; Normal for age and stage. PSYCH:  Alert and oriented, normal affect. MDM  Number of Diagnoses or Management Options  Diagnosis management comments: DDx:  viral vs bacterial URI, asthma exacerbation, COPD, bronchitis, PNE, PE, allergies, pneumothorax, atypical CP, costochondritis/chest wall pain, HF, MI, TAA/AAA, pericarditis, trauma (cardiac contusion, rib Fx, etc), pleuritic chest pain, pleural effusion, intraabdominal process  Consulted with KYRA You  concerning patient Rochelle Martin, standard discussion of reason for visit, HPI, ROS, PE, and current results available.   Report was given at this time and pt was turned over to the provider above, who will assume care of pt at this time and disposition. KYRA Ambrosio          Amount and/or Complexity of Data Reviewed  Clinical lab tests: ordered and reviewed  Tests in the radiology section of CPT®: ordered and reviewed  Tests in the medicine section of CPT®: ordered and reviewed  Review and summarize past medical records: yes  Independent visualization of images, tracings, or specimens: yes      ED Course       Procedures      Vitals:  Patient Vitals for the past 12 hrs:   Temp Pulse Resp BP SpO2   08/26/17 1506 98.8 °F (37.1 °C) 81 18 (!) 173/111 95 %         Medications ordered:   Medications   albuterol-ipratropium (DUO-NEB) 2.5 MG-0.5 MG/3 ML (3 mL Nebulization Given 8/26/17 1637)         Lab findings:  Recent Results (from the past 12 hour(s))   EKG, 12 LEAD, INITIAL    Collection Time: 08/26/17  3:22 PM   Result Value Ref Range    Ventricular Rate 86 BPM    Atrial Rate 86 BPM    P-R Interval 206 ms    QRS Duration 104 ms    Q-T Interval 374 ms    QTC Calculation (Bezet) 447 ms    Calculated P Axis 63 degrees    Calculated R Axis -48 degrees    Calculated T Axis 75 degrees    Diagnosis       Normal sinus rhythm  Left anterior fascicular block  Cannot rule out Anterior infarct , age undetermined  Abnormal ECG  When compared with ECG of 10-MADDISON-2017 06:32,  No significant change was found     CBC WITH AUTOMATED DIFF    Collection Time: 08/26/17  3:31 PM   Result Value Ref Range    WBC 6.0 4.6 - 13.2 K/uL    RBC 4.68 4.20 - 5.30 M/uL    HGB 9.8 (L) 12.0 - 16.0 g/dL    HCT 31.4 (L) 35.0 - 45.0 %    MCV 67.1 (L) 74.0 - 97.0 FL    MCH 20.9 (L) 24.0 - 34.0 PG    MCHC 31.2 31.0 - 37.0 g/dL    RDW 19.1 (H) 11.6 - 14.5 %    PLATELET 419 100 - 263 K/uL    NEUTROPHILS 62 40 - 73 %    LYMPHOCYTES 21 21 - 52 %    MONOCYTES 11 (H) 3 - 10 %    EOSINOPHILS 6 (H) 0 - 5 %    BASOPHILS 0 0 - 2 %    ABS. NEUTROPHILS 3.8 1.8 - 8.0 K/UL    ABS. LYMPHOCYTES 1.3 0.9 - 3.6 K/UL    ABS. MONOCYTES 0.7 0.05 - 1.2 K/UL    ABS. EOSINOPHILS 0.3 0.0 - 0.4 K/UL    ABS. BASOPHILS 0.0 0.0 - 0.06 K/UL    DF AUTOMATED     METABOLIC PANEL, BASIC    Collection Time: 08/26/17  3:31 PM   Result Value Ref Range    Sodium 141 136 - 145 mmol/L    Potassium 3.4 (L) 3.5 - 5.5 mmol/L    Chloride 103 100 - 108 mmol/L    CO2 27 21 - 32 mmol/L    Anion gap 11 3.0 - 18 mmol/L    Glucose 120 (H) 74 - 99 mg/dL    BUN 12 7.0 - 18 MG/DL    Creatinine 1.60 (H) 0.6 - 1.3 MG/DL    BUN/Creatinine ratio 8 (L) 12 - 20      GFR est AA 42 (L) >60 ml/min/1.73m2    GFR est non-AA 34 (L) >60 ml/min/1.73m2    Calcium 9.2 8.5 - 10.1 MG/DL   MAGNESIUM    Collection Time: 08/26/17  3:31 PM   Result Value Ref Range    Magnesium 2.2 1.6 - 2.6 mg/dL   CARDIAC PANEL,(CK, CKMB & TROPONIN)    Collection Time: 08/26/17  3:31 PM   Result Value Ref Range     26 - 192 U/L    CK - MB 1.6 <3.6 ng/ml    CK-MB Index 0.9 0.0 - 4.0 %    Troponin-I, Qt. <0.02 0.0 - 0.045 NG/ML   NT-PRO BNP    Collection Time: 08/26/17  3:31 PM   Result Value Ref Range    NT pro- (H) 0 - 450 PG/ML   HEPATIC FUNCTION PANEL    Collection Time: 08/26/17  3:31 PM   Result Value Ref Range    Protein, total 7.8 6.4 - 8.2 g/dL    Albumin 3.2 (L) 3.4 - 5.0 g/dL    Globulin 4.6 (H) 2.0 - 4.0 g/dL    A-G Ratio 0.7 (L) 0.8 - 1.7      Bilirubin, total 0.4 0.2 - 1.0 MG/DL    Bilirubin, direct 0.1 0.0 - 0.2 MG/DL    Alk.  phosphatase 73 45 - 117 U/L    AST (SGOT) 17 15 - 37 U/L    ALT (SGPT) 17 13 - 56 U/L   URINALYSIS W/ RFLX MICROSCOPIC    Collection Time: 08/26/17  4:40 PM   Result Value Ref Range    Color YELLOW      Appearance CLEAR      Specific gravity 1.010 1.005 - 1.030      pH (UA) 6.5 5.0 - 8.0      Protein 30 (A) NEG mg/dL    Glucose NEGATIVE  NEG mg/dL    Ketone NEGATIVE  NEG mg/dL    Bilirubin NEGATIVE  NEG      Blood NEGATIVE  NEG      Urobilinogen 0.2 0.2 - 1.0 EU/dL    Nitrites NEGATIVE  NEG      Leukocyte Esterase NEGATIVE  NEG     URINE MICROSCOPIC ONLY    Collection Time: 08/26/17  4:40 PM   Result Value Ref Range    WBC 0 to 2 0 - 4 /hpf    RBC 0 0 - 5 /hpf    Epithelial cells FEW 0 - 5 /lpf    Bacteria NEGATIVE  NEG /hpf       EKG interpretation by ED Physician:      X-Ray, CT or other radiology findings or impressions:  XR CHEST PORT   Final Result      CT CHEST WO CONT    (Results Pending)       Progress notes, Consult notes or additional Procedure notes:       Disposition:  Diagnosis: No diagnosis found. Disposition:   5:54 PM  Pt reevaluated at this time and is resting comfortably in NAD. Discussed results and findings, as well as, diagnosis and plan for discharge. Pt verbalizes understanding and agreement with plan. All questions addressed at this time. Follow-up Information     None           Patient's Medications   Start Taking    No medications on file   Continue Taking    ACYCLOVIR (ZOVIRAX) 400 MG TABLET    Take 400 mg by mouth daily. FERROUS SULFATE 324 MG (65 MG IRON) TABLET    Take 324 mg by mouth Daily (before breakfast). LABETALOL (NORMODYNE) 200 MG TABLET    Take 300 mg by mouth two (2) times a day. NIFEDIPINE CR (ADALAT CC) 90 MG CR TABLET    Take 90 mg by mouth daily. SIMVASTATIN (ZOCOR) 20 MG TABLET    Take 20 mg by mouth nightly. VENLAFAXINE-SR (EFFEXOR-XR) 150 MG CAPSULE    Take  by mouth daily.    These Medications have changed    No medications on file   Stop Taking    No medications on file

## 2017-08-26 NOTE — DISCHARGE INSTRUCTIONS
Leg and Ankle Edema: Care Instructions  Your Care Instructions  Swelling in the legs, ankles, and feet is called edema. It is common after you sit or stand for a while. Long plane flights or car rides often cause swelling in the legs and feet. You may also have swelling if you have to stand for long periods of time at your job. Problems with the veins in the legs (varicose veins) and changes in hormones can also cause swelling. Sometimes the swelling in the ankles and feet is caused by a more serious problem, such as heart failure, infection, blood clots, or liver or kidney disease. Follow-up care is a key part of your treatment and safety. Be sure to make and go to all appointments, and call your doctor if you are having problems. Its also a good idea to know your test results and keep a list of the medicines you take. How can you care for yourself at home? · If your doctor gave you medicine, take it as prescribed. Call your doctor if you think you are having a problem with your medicine. · Whenever you are resting, raise your legs up. Try to keep the swollen area higher than the level of your heart. · Take breaks from standing or sitting in one position. ¨ Walk around to increase the blood flow in your lower legs. ¨ Move your feet and ankles often while you stand, or tighten and relax your leg muscles. · Wear support stockings. Put them on in the morning, before swelling gets worse. · Eat a balanced diet. Lose weight if you need to. · Limit the amount of salt (sodium) in your diet. Salt holds fluid in the body and may increase swelling. When should you call for help? Call 911 anytime you think you may need emergency care. For example, call if:  · You have symptoms of a blood clot in your lung (called a pulmonary embolism). These may include:  ¨ Sudden chest pain. ¨ Trouble breathing. ¨ Coughing up blood.   Call your doctor now or seek immediate medical care if:  · You have signs of a blood clot, such as:  ¨ Pain in your calf, back of the knee, thigh, or groin. ¨ Redness and swelling in your leg or groin. · You have symptoms of infection, such as:  ¨ Increased pain, swelling, warmth, or redness. ¨ Red streaks or pus. ¨ A fever. Watch closely for changes in your health, and be sure to contact your doctor if:  · Your swelling is getting worse. · You have new or worsening pain in your legs. · You do not get better as expected. Where can you learn more? Go to http://marty-chente.info/. Enter D326 in the search box to learn more about \"Leg and Ankle Edema: Care Instructions. \"  Current as of: March 20, 2017  Content Version: 11.3  © 6953-4915 [employer]. Care instructions adapted under license by [employer] (which disclaims liability or warranty for this information). If you have questions about a medical condition or this instruction, always ask your healthcare professional. [place] [address] any warranty or liability for your use of this information. Pneumonia: Care Instructions  Your Care Instructions    Pneumonia is an infection of the lungs. Most cases are caused by infections from bacteria or viruses. Pneumonia may be mild or very severe. If it is caused by bacteria, you will be treated with antibiotics. It may take a few weeks to a few months to recover fully from pneumonia, depending on how sick you were and whether your overall health is good. Follow-up care is a key part of your treatment and safety. Be sure to make and go to all appointments, and call your doctor if you are having problems. Its also a good idea to know your test results and keep a list of the medicines you take. How can you care for yourself at home? · Take your antibiotics exactly as directed. Do not stop taking the medicine just because you are feeling better. You need to take the full course of antibiotics.   · Take your medicines exactly as prescribed. Call your doctor if you think you are having a problem with your medicine. · Get plenty of rest and sleep. You may feel weak and tired for a while, but your energy level will improve with time. · To prevent dehydration, drink plenty of fluids, enough so that your urine is light yellow or clear like water. Choose water and other caffeine-free clear liquids until you feel better. If you have kidney, heart, or liver disease and have to limit fluids, talk with your doctor before you increase the amount of fluids you drink. · Take care of your cough so you can rest. A cough that brings up mucus from your lungs is common with pneumonia. It is one way your body gets rid of the infection. But if coughing keeps you from resting or causes severe fatigue and chest-wall pain, talk to your doctor. He or she may suggest that you take a medicine to reduce the cough. · Use a vaporizer or humidifier to add moisture to your bedroom. Follow the directions for cleaning the machine. · Do not smoke or allow others to smoke around you. Smoke will make your cough last longer. If you need help quitting, talk to your doctor about stop-smoking programs and medicines. These can increase your chances of quitting for good. · Take an over-the-counter pain medicine, such as acetaminophen (Tylenol), ibuprofen (Advil, Motrin), or naproxen (Aleve). Read and follow all instructions on the label. · Do not take two or more pain medicines at the same time unless the doctor told you to. Many pain medicines have acetaminophen, which is Tylenol. Too much acetaminophen (Tylenol) can be harmful. · If you were given a spirometer to measure how well your lungs are working, use it as instructed. This can help your doctor tell how your recovery is going. · To prevent pneumonia in the future, talk to your doctor about getting a flu vaccine (once a year) and a pneumococcal vaccine (one time only for most people).   When should you call for help?  Call 911 anytime you think you may need emergency care. For example, call if:  · You have severe trouble breathing. Call your doctor now or seek immediate medical care if:  · You cough up dark brown or bloody mucus (sputum). · You have new or worse trouble breathing. · You are dizzy or lightheaded, or you feel like you may faint. Watch closely for changes in your health, and be sure to contact your doctor if:  · You have a new or higher fever. · You are coughing more deeply or more often. · You are not getting better after 2 days (48 hours). · You do not get better as expected. Where can you learn more? Go to http://marty-chente.info/. Enter 01.84.63.10.33 in the search box to learn more about \"Pneumonia: Care Instructions. \"  Current as of: March 25, 2017  Content Version: 11.3  © 4517-9432 [employer]. Care instructions adapted under license by [employer] (which disclaims liability or warranty for this information). If you have questions about a medical condition or this instruction, always ask your healthcare professional. [place] [address] any warranty or liability for your use of this information.

## 2017-08-26 NOTE — ED NOTES
[facility] EMERGENCY DEPT      50 y.o. female with noted past medical history who presents to the emergency department with SOB and LE edema worsening over the last week. I performed a brief evaluation, including history and physical, of the patient here in triage and I have determined that pt will need further treatment and evaluation from the main side ER physician. I have placed initial orders to help in expediting patients care. Danny Mason M.D.

## 2017-08-26 NOTE — ED TRIAGE NOTES
\"My legs and feet are swollen. I have some shortness of breath. I have pain in my left side. I have also been using the restroom a lot as well. \"

## 2017-08-27 LAB
ATRIAL RATE: 86 BPM
CALCULATED P AXIS, ECG09: 63 DEGREES
CALCULATED R AXIS, ECG10: -48 DEGREES
CALCULATED T AXIS, ECG11: 75 DEGREES
DIAGNOSIS, 93000: NORMAL
P-R INTERVAL, ECG05: 206 MS
Q-T INTERVAL, ECG07: 374 MS
QRS DURATION, ECG06: 104 MS
QTC CALCULATION (BEZET), ECG08: 447 MS
VENTRICULAR RATE, ECG03: 86 BPM

## 2017-08-27 NOTE — ED NOTES
Purposeful rounding completed:    Side rails up x 2:  YES  Bed low and wheels and locked: YES  Call bell in reach: YES  Comfort addressed: YES     Toileting needs addressed: YES  Plan of care reviewed/updated with patient and or family members: YES  IV site assessed: YES  Pain assessed and addressed: denies pain    Pt discharge pending levaquin completion

## 2017-11-01 DIAGNOSIS — D86.9 SARCOIDOSIS: Primary | ICD-10-CM

## 2017-11-01 NOTE — PROGRESS NOTES
Verbal Order with read back per Dr. Myranda Quesada MD  For PFT smart panel. AMB POC PFT complete w/ bronchodilator  AMB POC PFT complete w/o bronchodilator  Gas Dilute/ wash out lung vol w/wo distrib vet & vol  Diffusing capacity    Dr. Myranda Quesada MD will co-sign the orders.

## 2017-11-06 ENCOUNTER — OFFICE VISIT (OUTPATIENT)
Dept: PULMONOLOGY | Age: 47
End: 2017-11-06

## 2017-11-06 VITALS
HEART RATE: 88 BPM | HEIGHT: 66 IN | SYSTOLIC BLOOD PRESSURE: 170 MMHG | OXYGEN SATURATION: 97 % | BODY MASS INDEX: 47.09 KG/M2 | TEMPERATURE: 98.7 F | RESPIRATION RATE: 21 BRPM | DIASTOLIC BLOOD PRESSURE: 90 MMHG | WEIGHT: 293 LBS

## 2017-11-06 DIAGNOSIS — D86.9 SARCOIDOSIS: ICD-10-CM

## 2017-11-06 DIAGNOSIS — R91.8 PULMONARY INFILTRATES: Primary | ICD-10-CM

## 2017-11-06 NOTE — MR AVS SNAPSHOT
Visit Information Date & Time Provider Department Dept. Phone Encounter #  
 11/6/2017  2:30 PM Curley Bracken, MD [facility] Arslan Rios [id number] Follow-up Instructions Return in about 2 months (around 1/6/2018). Upcoming Health Maintenance Date Due  
 FOOT EXAM Q1 2/25/1978 MICROALBUMIN Q1 2/25/1978 EYE EXAM RETINAL OR DILATED Q1 2/25/1978 Pneumococcal 19-64 Medium Risk (1 of 1 - PPSV23) 2/25/1987 DTaP/Tdap/Td series (1 - Tdap) 2/25/1989 PAP AKA CERVICAL CYTOLOGY 2/25/1989 HEMOGLOBIN A1C Q6M 3/4/2014 LIPID PANEL Q1 9/5/2014 INFLUENZA AGE 9 TO ADULT 8/1/2017 Allergies as of 11/6/2017  Review Complete On: 11/6/2017 By: RT Be No Known Allergies Current Immunizations  Never Reviewed No immunizations on file. Not reviewed this visit You Were Diagnosed With   
  
 Codes Comments Pulmonary infiltrates    -  Primary ICD-10-CM: R91.8 ICD-9-CM: 793.19 Sarcoidosis     ICD-10-CM: D86.9 ICD-9-CM: 135 Vitals BP Pulse Temp Resp Height(growth percentile) Weight(growth percentile) 170/90 (BP 1 Location: Left arm, BP Patient Position: At rest) 88 98.7 °F (37.1 °C) (Oral) 21 5' 6\" (1.676 m) (!) 357 lb (161.9 kg) SpO2 BMI OB Status Smoking Status 97% 57.62 kg/m2 Having regular periods Former Smoker BMI and BSA Data Body Mass Index Body Surface Area  
 57.62 kg/m 2 2.75 m 2 Your Updated Medication List  
  
   
This list is accurate as of: 11/6/17  3:15 PM.  Always use your most recent med list.  
  
  
  
  
 acyclovir 400 mg tablet Commonly known as:  ZOVIRAX Take 400 mg by mouth daily. ferrous sulfate 324 mg (65 mg iron) tablet Take 324 mg by mouth Daily (before breakfast). labetalol 200 mg tablet Commonly known as:  Luisito Vy Take 300 mg by mouth two (2) times a day. levoFLOXacin 750 mg tablet Commonly known as:  Taina Climes Take 1 Tab by mouth daily. MULTIVITAMIN PO Take  by mouth. NIFEdipine ER 90 mg ER tablet Commonly known as:  ADALAT CC Take 90 mg by mouth daily. simvastatin 20 mg tablet Commonly known as:  ZOCOR Take 20 mg by mouth nightly. venlafaxine- mg capsule Commonly known as:  EFFEXOR-XR Take  by mouth daily. We Performed the Following AMB POC PFT COMPLETE W/BRONCHODILATOR [50088 CPT(R)] DIFFUSING CAPACITY J299149 CPT(R)] GAS DILUT/WASHOUT LUNG VOL W/WO DISTRIB VENT&VOL [85011 CPT(R)] Follow-up Instructions Return in about 2 months (around 1/6/2018). To-Do List   
 Around 01/02/2018 Lab:  ANGIOTENSIN CONVERTING ENZYME Around 01/02/2018 Imaging:  CT CHEST WO CONT Around 01/02/2018 Lab:  NT-PRO BNP Referral Information Referral ID Referred By Referred To  
  
 [id number] MELODIE Marie Not Available Visits Status Start Date End Date 1 New Request 11/6/17 11/6/18 If your referral has a status of pending review or denied, additional information will be sent to support the outcome of this decision. Introducing [facility]! Caterina Diallo introduces HelpMeNow patient portal. Now you can access parts of your medical record, email your doctor's office, and request medication refills online. 1. In your internet browser, go to [email or web address] 2. Click on the First Time User? Click Here link in the Sign In box. You will see the New Member Sign Up page. 3. Enter your HelpMeNow Access Code exactly as it appears below. You will not need to use this code after youve completed the sign-up process. If you do not sign up before the expiration date, you must request a new code. · HelpMeNow Access Code: [email or web address] Expires: 2/4/2018  1:17 PM 
 
4.  Enter the last four digits of your Social Security Number (xxxx) and Date of Birth (mm/dd/yyyy) as indicated and click Submit. You will be taken to the next sign-up page. 5. Create a CONEXANCE MD ID. This will be your CONEXANCE MD login ID and cannot be changed, so think of one that is secure and easy to remember. 6. Create a CONEXANCE MD password. You can change your password at any time. 7. Enter your Password Reset Question and Answer. This can be used at a later time if you forget your password. 8. Enter your e-mail address. You will receive e-mail notification when new information is available in [address] 9. Click Sign Up. You can now view and download portions of your medical record. 10. Click the Download Summary menu link to download a portable copy of your medical information. If you have questions, please visit the Frequently Asked Questions section of the CONEXANCE MD website. Remember, CONEXANCE MD is NOT to be used for urgent needs. For medical emergencies, dial 911. Now available from your iPhone and Android! Please provide this summary of care documentation to your next provider. Your primary care clinician is listed as Hang Gomez. If you have any questions after today's visit, please call [phone].

## 2017-11-06 NOTE — PROGRESS NOTES
[facility]  Pulmonary, Critical Care, and Sleep Medicine  Dear Dr. Selena Pham,    Chief complaint:   pulmonary infiltrates  I mean is not to a degree in computer    HPI:  Bert Cardenas is 50years old and comes to the office today at your request concerning an abnormal CT of the chest which was obtained when the patient went to the emergency room for shortness of breath in August..  At that time the patient relates that she had dyspnea with exertion cough productive of mucus at that timelAt that time the patient had a cough SOB and fever and was treated with antibiotics and all here symptoms eventually resvolved. She now denies fever dyspnea PND C/P cough and says she has no sleep apnea but does have daytime sleepiness at times  No Known Allergies  Current Outpatient Prescriptions   Medication Sig    MULTIVITAMIN PO Take  by mouth.  levoFLOXacin (LEVAQUIN) 750 mg tablet Take 1 Tab by mouth daily.  acyclovir (ZOVIRAX) 400 mg tablet Take 400 mg by mouth daily.  ferrous sulfate 324 mg (65 mg iron) tablet Take 324 mg by mouth Daily (before breakfast).  venlafaxine-SR (EFFEXOR-XR) 150 mg capsule Take  by mouth daily.  labetalol (NORMODYNE) 200 mg tablet Take 300 mg by mouth two (2) times a day.  simvastatin (ZOCOR) 20 mg tablet Take 20 mg by mouth nightly.  NIFEdipine CR (ADALAT CC) 90 mg CR tablet Take 90 mg by mouth daily. No current facility-administered medications for this visit.       Past Medical History:   Diagnosis Date    Anemia     Bone lesion     Chronic kidney disease     Depressed     Diabetes ([address]     Diabetes mellitus ([address]     Hypertension     Kidney problem     Pneumonia     Sarcoidosis     Thyroid disorder     Uterus problem      Past Surgical History:   Procedure Laterality Date    HX  SECTION      HX OTHER SURGICAL      neck biopsy    HX TUBAL LIGATION         Family history:  [place]      Social History:on disability life long non cigarette smoker      Review of systems:  She denies syncope focal muscle weakness or numbness she visual disturbances hearing loss trouble swallowing chronic abdomen pain melena blood in urine stool rash although has had chronic rash in past and denies arthritis weight loss    Physical Exam:  Visit Vitals    /90 (BP 1 Location: Left arm, BP Patient Position: At rest)    Pulse 88    Temp 98.7 °F (37.1 °C) (Oral)    Resp 21    Ht 5' 6\" (1.676 m)    Wt (!) 161.9 kg (357 lb)    SpO2 97%    BMI 57.62 kg/m2   wd/morbid obese    HEENT: pupils equal, reactive, sclera, non-icteric   Oropharynx tongue: normal   Neck: Supple   Lymph Nodes: Supra clavicular and cervical nodes, negative   Chest: Equal symmetrical expansion, no dullness, no wheezes, rales or rubs   Heart: Regular, rhythm with occassional extra beat without arun or murmur   Abdomen: soft, non-tender no masses or organomegaly   Extremities: no cyanosis, clubbing, 1+ edema bilateral pretibial  Muskuloskelatal: no acute joint inflamation or muscle wasting  Skin: No rash except like acne on face and decolorated flat lesions on arms  Neurological: alert, oriented, moves all extremities      LABS:  CT chest personally reviewed 8/26/17: primarily GG infiltrates and mosaic attenuation  PFT 11/6/17: Spirometry with moderate obstructive lung defect and borderline reactive airway disease lung volumes are normal and diffusion is slightly reduced    Impression: It is not clear if the CT imaging represents acute or chronic disease but because the  symptoms have resolved and is most likely is a chronic patient symptoms have resolved it is most likely that the abnormalities represent acute illness    Plan:  Reevaluate CT imaging with repeat CT in 8 weeks also with an ACE level and BNP    Thanks for allowing me to share in this patient's evaluation    Sincerely,    Bel Donovan MD , [facility]    CC: Dr. Jenelle Marroquin. [address]  [place], [address] [address]     P: [phone]     F: [phone]

## 2018-01-02 ENCOUNTER — HOSPITAL ENCOUNTER (OUTPATIENT)
Dept: CT IMAGING | Age: 48
Discharge: HOME | End: 2018-01-02
Attending: INTERNAL MEDICINE
Payer: MEDICAID

## 2018-01-02 DIAGNOSIS — R91.8 PULMONARY INFILTRATES: ICD-10-CM

## 2018-01-02 DIAGNOSIS — D86.9 SARCOIDOSIS: ICD-10-CM

## 2018-01-02 PROCEDURE — 71250 CT THORAX DX C-: CPT

## 2018-01-22 ENCOUNTER — OFFICE VISIT (OUTPATIENT)
Dept: PULMONOLOGY | Age: 48
End: 2018-01-22

## 2018-01-22 VITALS
HEART RATE: 79 BPM | DIASTOLIC BLOOD PRESSURE: 90 MMHG | TEMPERATURE: 98.5 F | RESPIRATION RATE: 16 BRPM | HEIGHT: 66 IN | WEIGHT: 293 LBS | OXYGEN SATURATION: 97 % | SYSTOLIC BLOOD PRESSURE: 140 MMHG | BODY MASS INDEX: 47.09 KG/M2

## 2018-01-22 DIAGNOSIS — G47.9 SLEEP DISORDER: ICD-10-CM

## 2018-01-22 DIAGNOSIS — R91.8 PULMONARY NODULES: ICD-10-CM

## 2018-01-22 DIAGNOSIS — R91.8 PULMONARY INFILTRATES: ICD-10-CM

## 2018-01-22 DIAGNOSIS — D86.9 SARCOIDOSIS: Primary | ICD-10-CM

## 2018-01-22 RX ORDER — BUDESONIDE AND FORMOTEROL FUMARATE DIHYDRATE 160; 4.5 UG/1; UG/1
2 AEROSOL RESPIRATORY (INHALATION) 2 TIMES DAILY
Qty: 1 INHALER | Refills: 5 | Status: SHIPPED | OUTPATIENT
Start: 2018-01-22 | End: 2018-10-18 | Stop reason: SDUPTHER

## 2018-01-22 NOTE — PROGRESS NOTES
Chief Complaint   Patient presents with    Sarcoidosis     pulmonary infiltrate     Patient here for routine follow up visit.

## 2018-01-22 NOTE — PROGRESS NOTES
[facility]  Pulmonary, Critical Care, and Sleep Medicine      Chief complaint:  Pulmonary infiltrates and history of sarcoidosis    HPI:    Natty Rincon    is 50years old and returns the office today for follow-up concerning pulmonary infiltrates and history of sarcoidosis and relates that she still has significant shortness of breath with walking greater than a couple 100 feet. She denies any chest pain significant cough mucus production but has leg edema without leg pain and also reports difficulty with sleeping also has startling reactions with sleep and daytime sleepiness      No Known Allergies  Current Outpatient Prescriptions   Medication Sig    MULTIVITAMIN PO Take  by mouth.  levoFLOXacin (LEVAQUIN) 750 mg tablet Take 1 Tab by mouth daily.  acyclovir (ZOVIRAX) 400 mg tablet Take 400 mg by mouth daily.  ferrous sulfate 324 mg (65 mg iron) tablet Take 324 mg by mouth Daily (before breakfast).  venlafaxine-SR (EFFEXOR-XR) 150 mg capsule Take  by mouth daily.  labetalol (NORMODYNE) 200 mg tablet Take 300 mg by mouth two (2) times a day.  simvastatin (ZOCOR) 20 mg tablet Take 20 mg by mouth nightly.  NIFEdipine CR (ADALAT CC) 90 mg CR tablet Take 90 mg by mouth daily. No current facility-administered medications for this visit. Past Medical History:   Diagnosis Date    Anemia     Bone lesion     Chronic kidney disease     Depressed     Diabetes ([address]     Diabetes mellitus ([address]     Hypertension     Kidney problem     Pneumonia     Sarcoidosis     Thyroid disorder     Uterus problem      Past Surgical History:   Procedure Laterality Date    HX  SECTION      HX OTHER SURGICAL      neck biopsy    HX TUBAL LIGATION       Social History     Social History    Marital status: SINGLE     Spouse name: N/A    Number of children: N/A    Years of education: N/A     Occupational History    Not on file.      Social History Main Topics    Smoking status: Former Smoker     Types: Cigarettes    Smokeless tobacco: Never Used    Alcohol use Yes      Comment: beer rarely    Drug use: No    Sexual activity: Not on file     Other Topics Concern    Not on file     Social History Narrative     Family History   Problem Relation Age of Onset    Hypertension Mother     Diabetes Mother     Cancer Mother     Cancer Sister        Review of systems:  She denies fever chills poor appetite or weight loss    Physical Exam:  Visit Vitals    /90 (BP 1 Location: Left arm, BP Patient Position: Sitting)    Pulse 79    Temp 98.5 °F (36.9 °C) (Oral)    Resp 16    Ht 5' 6\" (1.676 m)    Wt (!) 164.2 kg (362 lb)    SpO2 97%  Comment: [email or web address]    BMI 58.43 kg/m2       Well-developed and morbidly obese  HEENT: WNL  Lymph node exam: Supraclavicular cervical lymph nodes negative  Chest: Equal symmetrical expansion no dullness no wheezes rales rubs  Heart: Regular rhythm no gallop no murmur no JVD brawny peripheral edema  Extremities: No cyanosis clubbing or calf tenderness  Neurological: Alert and oriented    Labs:  CT of the chest 1/2/18 personally reviewed: With resolution of most nodules however a few pulmonary nodules remain which are groundglass and small. Also there is significant mosaic attenuation throughout both lungs particularly in the lower lung zones.   Cardiomegaly and small pericardial effusion  O2 sat room air at rest 97%    Impression:     Mosaic attenuation on CT imaging and mild obstructive lung defect suggests airway obstruction and may benefit from bronchodilator and ICS  History of sleep disorder and daytime sleepiness as well as brawny leg edema and cardiomegaly with pericardial effusion on CT image this may suggest ANITA and pulmonary hypertension    Plan:     Echocardiogram to rule out pulmonary hypertension  Sleep study to rule out ANITA  Empiric treatment with Symbicort twice daily with follow-up CT in the future to see if this resolves mosaic attenuation  Follow-up in 6-8 weeks    Laine Garcia MD , [facility]    CC: Bhupendra Enrique NP     [address] [facility]. [address]  [place], [address] [address]     P: [phone]     F: [phone]

## 2018-01-22 NOTE — MR AVS SNAPSHOT
[address] [facility], [address] [address] 
[phone] Patient: Merari Lilly MRN: [id number] Visit Information Date & Time Provider Department Dept. Phone Encounter #  
 1/22/2018 12:15 PM Romeo Fall MD [facility] [place] [id number] [phone] Follow-up Instructions Return in about 7 weeks (around 3/12/2018). Your Appointments 1/22/2018 12:15 PM  
Follow Up with Romeo Fall MD  
[address] ([address]) Appt Note: FROM 11/6/17; CT done 01/02/2018 @ [facility]; appt conf 01/19/18 ayd  
 [address] [facility], [address] [address]  
[phone]  
  
   
 [address] [place] [address], [address] [facility] [place] [address] Upcoming Health Maintenance Date Due  
 FOOT EXAM Q1 2/25/1978 MICROALBUMIN Q1 2/25/1978 EYE EXAM RETINAL OR DILATED Q1 2/25/1978 Pneumococcal 19-64 Medium Risk (1 of 1 - PPSV23) 2/25/1987 DTaP/Tdap/Td series (1 - Tdap) 2/25/1989 PAP AKA CERVICAL CYTOLOGY 2/25/1989 HEMOGLOBIN A1C Q6M 3/4/2014 LIPID PANEL Q1 9/5/2014 Influenza Age 5 to Adult 8/1/2017 Allergies as of 1/22/2018  Review Complete On: 1/22/2018 By: Josseline Salinas LPN No Known Allergies Current Immunizations  Never Reviewed No immunizations on file. Not reviewed this visit You Were Diagnosed With   
  
 Codes Comments Sarcoidosis    -  Primary ICD-10-CM: D86.9 ICD-9-CM: 135 Pulmonary infiltrates     ICD-10-CM: R91.8 ICD-9-CM: 793.19 Pulmonary nodules     ICD-10-CM: R91.8 ICD-9-CM: 793.19 Sleep disorder     ICD-10-CM: G47.9 ICD-9-CM: 780.50 Vitals BP Pulse Temp Resp Height(growth percentile) Weight(growth percentile) 140/90 (BP 1 Location: Left arm, BP Patient Position: Sitting) 79 98.5 °F (36.9 °C) (Oral) 16 5' 6\" (1.676 m) (!) 362 lb (164.2 kg) SpO2 BMI OB Status Smoking Status 97% 58.43 kg/m2 Having regular periods Former Smoker BMI and BSA Data Body Mass Index Body Surface Area  
 58.43 kg/m 2 2.77 m 2 Preferred Pharmacy Pharmacy Name Phone [id number]- [address] [phone] Your Updated Medication List  
  
   
This list is accurate as of: 1/22/18 12:13 PM.  Always use your most recent med list.  
  
  
  
  
 acyclovir 400 mg tablet Commonly known as:  ZOVIRAX Take 400 mg by mouth daily. budesonide-formoterol 160-4.5 mcg/actuation Hfaa Commonly known as:  SYMBICORT Take 2 Puffs by inhalation two (2) times a day. ferrous sulfate 324 mg (65 mg iron) tablet Take 324 mg by mouth Daily (before breakfast). labetalol 200 mg tablet Commonly known as:  Georgine Reef Take 300 mg by mouth two (2) times a day. levoFLOXacin 750 mg tablet Commonly known as:  Josr Shaggy Take 1 Tab by mouth daily. MULTIVITAMIN PO Take  by mouth. NIFEdipine ER 90 mg ER tablet Commonly known as:  ADALAT CC Take 90 mg by mouth daily. simvastatin 20 mg tablet Commonly known as:  ZOCOR Take 20 mg by mouth nightly. venlafaxine- mg capsule Commonly known as:  EFFEXOR-XR Take  by mouth daily. Prescriptions Sent to Pharmacy Refills  
 budesonide-formoterol (SYMBICORT) 160-4.5 mcg/actuation HFAA 5 Sig: Take 2 Puffs by inhalation two (2) times a day. Class: Normal  
 Pharmacy: [address] [place] [address] Ph #: [phone] Route: Inhalation Follow-up Instructions Return in about 7 weeks (around 3/12/2018). To-Do List   
 Around 01/23/2018 ECHO:  2D ECHO COMPLETE ADULT (TTE) W OR WO CONTR Around 01/23/2018 Sleep Center:  SPLIT CPAP/PSG Patient Instructions Symbicort 2 puffs twice daily and remember to wash mouth with water and spit it out after inhaling Always call for symptoms such as increasing shortness of breath or a cough productive of discolored mucus Obtain sleep study and heart study (echocardiogram) before your next visit Introducing [address]! [facility] introduces ConnectAndSellt patient portal. Now you can access parts of your medical record, email your doctor's office, and request medication refills online. 1. In your internet browser, go to [email or web address] 2. Click on the First Time User? Click Here link in the Sign In box. You will see the New Member Sign Up page. 3. Enter your [employer] Access Code exactly as it appears below. You will not need to use this code after youve completed the sign-up process. If you do not sign up before the expiration date, you must request a new code. · [employer] Access Code: [email or web address] Expires: 2/4/2018  1:17 PM 
 
4. Enter the last four digits of your Social Security Number (xxxx) and Date of Birth (mm/dd/yyyy) as indicated and click Submit. You will be taken to the next sign-up page. 5. Create a [employer] Anal[employer] ID. This will be your [employer] login ID and cannot be changed, so think of one that is secure and easy to remember. 6. Create a [employer] password. You can change your password at any time. 7. Enter your Password Reset Question and Answer. This can be used at a later time if you forget your password. 8. Enter your e-mail address. You will receive e-mail notification when new information is available in [address] 9. Click Sign Up. You can now view and download portions of your medical record. 10. Click the Download Summary menu link to download a portable copy of your medical information. If you have questions, please visit the Frequently Asked Questions section of the [employer] website. Remember, [employer] is NOT to be used for urgent needs. For medical emergencies, dial 911. Now available from your iPhone and Android! Please provide this summary of care documentation to your next provider. Your primary care clinician is listed as Saundra Do. If you have any questions after today's visit, please call [phone].

## 2018-01-22 NOTE — PATIENT INSTRUCTIONS
Symbicort 2 puffs twice daily and remember to wash mouth with water and spit it out after inhaling    Always call for symptoms such as increasing shortness of breath or a cough productive of discolored mucus    Obtain sleep study and heart study (echocardiogram) before your next visit

## 2018-01-24 ENCOUNTER — TELEPHONE (OUTPATIENT)
Dept: SLEEP MEDICINE | Age: 48
End: 2018-01-24

## 2018-01-24 NOTE — TELEPHONE ENCOUNTER
JOSE GUADALUPE w patient son for patient to return call to [facility] sleep study.

## 2018-01-29 ENCOUNTER — TELEPHONE (OUTPATIENT)
Dept: PULMONOLOGY | Age: 48
End: 2018-01-29

## 2018-01-29 NOTE — TELEPHONE ENCOUNTER
Pt calling back Dr. Nathanael Chiang. She states she received a call last week and message was Dr. Nathanael Chiang wanted to discuss something else with her.

## 2018-01-29 NOTE — TELEPHONE ENCOUNTER
Pt states that she is returning Dr. Sajan Pritchard call. Pt did not give information on return call.  pls call pt

## 2018-02-14 ENCOUNTER — HOSPITAL ENCOUNTER (OUTPATIENT)
Dept: NON INVASIVE DIAGNOSTICS | Age: 48
Discharge: HOME | End: 2018-02-14
Attending: INTERNAL MEDICINE
Payer: MEDICAID

## 2018-02-14 DIAGNOSIS — G47.9 SLEEP DISORDER: ICD-10-CM

## 2018-02-14 DIAGNOSIS — D86.9 SARCOIDOSIS: ICD-10-CM

## 2018-02-14 PROCEDURE — 93306 TTE W/DOPPLER COMPLETE: CPT

## 2018-03-20 ENCOUNTER — OFFICE VISIT (OUTPATIENT)
Dept: PULMONOLOGY | Age: 48
End: 2018-03-20

## 2018-03-20 VITALS
SYSTOLIC BLOOD PRESSURE: 130 MMHG | BODY MASS INDEX: 47.09 KG/M2 | DIASTOLIC BLOOD PRESSURE: 80 MMHG | WEIGHT: 293 LBS | HEIGHT: 66 IN | OXYGEN SATURATION: 95 % | HEART RATE: 79 BPM | RESPIRATION RATE: 20 BRPM | TEMPERATURE: 98.6 F

## 2018-03-20 DIAGNOSIS — G47.9 SLEEP DISORDER: ICD-10-CM

## 2018-03-20 DIAGNOSIS — R91.8 PULMONARY NODULES: ICD-10-CM

## 2018-03-20 DIAGNOSIS — D86.9 SARCOIDOSIS: Primary | ICD-10-CM

## 2018-03-20 DIAGNOSIS — R91.8 PULMONARY INFILTRATES: ICD-10-CM

## 2018-03-20 NOTE — PATIENT INSTRUCTIONS
Symbicort 2 inhalations twice daily and remember to wash mouth with water and spit it out after inhaling    Always call for symptoms such as increasing shortness of breath or cough productive of discolored mucus

## 2018-03-20 NOTE — PROGRESS NOTES
[facility]  Pulmonary, Critical Care, and Sleep Medicine      Chief complaint:  Sarcoidosis pulmonary infiltrates and pulmonary nodules and sleep disorder    HPI:    Amanda Ferrer    is 11years old and returns the office today for follow-up concerning her shortness of breath or sarcoidosis including pulmonary infiltrates and groundglass pulmonary nodules felt to be most likely related to sarcoidosis. The patient also had difficulty with sleep and possible obstructive sleep apnea which needed to be determined however her sleep study is not until next month. The patient relates since starting Symbicort or shortness of breath is improved significantly she denies chest pain significant cough and still has profound leg edema      No Known Allergies  Current Outpatient Prescriptions   Medication Sig    budesonide-formoterol (SYMBICORT) 160-4.5 mcg/actuation HFAA Take 2 Puffs by inhalation two (2) times a day.  acyclovir (ZOVIRAX) 400 mg tablet Take 400 mg by mouth daily.  ferrous sulfate 324 mg (65 mg iron) tablet Take 324 mg by mouth Daily (before breakfast).  venlafaxine-SR (EFFEXOR-XR) 150 mg capsule Take  by mouth daily.  labetalol (NORMODYNE) 200 mg tablet Take 300 mg by mouth two (2) times a day.  simvastatin (ZOCOR) 20 mg tablet Take 20 mg by mouth nightly.  NIFEdipine CR (ADALAT CC) 90 mg CR tablet Take 90 mg by mouth daily.  MULTIVITAMIN PO Take  by mouth.  levoFLOXacin (LEVAQUIN) 750 mg tablet Take 1 Tab by mouth daily. No current facility-administered medications for this visit.       Past Medical History:   Diagnosis Date    Anemia     Bone lesion     Chronic kidney disease     Depressed     Diabetes ([address]     [facility] [address]     Hypertension     Kidney problem     Pneumonia     Sarcoidosis     Thyroid disorder     Uterus problem      Past Surgical History:   Procedure Laterality Date    HX  SECTION      HX OTHER SURGICAL neck biopsy    HX TUBAL LIGATION       Social History     Social History    Marital status: SINGLE     Spouse name: N/A    Number of children: N/A    Years of education: N/A     Occupational History    Not on file. Social History Main Topics    Smoking status: Former Smoker     Types: Cigarettes    Smokeless tobacco: Never Used    Alcohol use Yes      Comment: beer rarely    Drug use: No    Sexual activity: Not on file     Other Topics Concern    Not on file     Social History Narrative     Family History   Problem Relation Age of Onset    Hypertension Mother     Diabetes Mother     Cancer Mother     Cancer Sister        Review of systems:  She denies fever chills poor appetite but states she has lost a few pounds. She denies visual disturbances    Physical Exam:  Visit Vitals    /80    Pulse 79    Temp 98.6 °F (37 °C)    Resp 20    Ht 5' 6\" (1.676 m)    Wt 158.3 kg (349 lb)    LMP 03/18/2018    SpO2 95%    BMI 56.33 kg/m2       Well-developed morbidly obese  HEENT: WNL  Lymph node exam: Supra clavicular cervical lymph nodes negative  Chest: Equal symmetrical expansion no dullness and absence of wheezes rales rubs  Heart: Regular rhythm no gallop no murmur bilateral brawny edema both lower extremities  Extremities: No cyanosis clubbing or calf tenderness  Neurological: Alert and oriented    Labs:  Echocardiogram 2/14/18: No pulmonary hypertension or left ventricular dysfunction  O2 sat room air at rest 95%    Impression:     Sarcoidosis symptomatically improved with less shortness of breath  Sleep disorder which needs to be evaluated  Plan:     Await results of sleep study next month  Follow-up CT imaging for her nodules and infiltrates in 6 months  Continue Symbicort twice daily  Follow-up in 6 months or sooner if needed    José Miguel Ricketts MD , [facility]    CC: Ritesh Carney NP     [address]  [place], [address] [facility] [address]     P: [phone]     F: [phone]

## 2018-05-14 ENCOUNTER — HOSPITAL ENCOUNTER (EMERGENCY)
Age: 48
Discharge: HOME | End: 2018-05-14
Attending: EMERGENCY MEDICINE
Payer: MEDICAID

## 2018-05-14 VITALS
DIASTOLIC BLOOD PRESSURE: 85 MMHG | OXYGEN SATURATION: 99 % | SYSTOLIC BLOOD PRESSURE: 135 MMHG | RESPIRATION RATE: 18 BRPM | WEIGHT: 293 LBS | BODY MASS INDEX: 51.91 KG/M2 | HEIGHT: 63 IN | HEART RATE: 70 BPM | TEMPERATURE: 98 F

## 2018-05-14 DIAGNOSIS — M54.50 CHRONIC RIGHT-SIDED LOW BACK PAIN WITHOUT SCIATICA: Primary | ICD-10-CM

## 2018-05-14 DIAGNOSIS — R60.9 PERIPHERAL EDEMA: ICD-10-CM

## 2018-05-14 DIAGNOSIS — G89.29 CHRONIC RIGHT-SIDED LOW BACK PAIN WITHOUT SCIATICA: Primary | ICD-10-CM

## 2018-05-14 LAB
ALBUMIN SERPL-MCNC: 3.3 G/DL (ref 3.4–5)
ALBUMIN/GLOB SERPL: 0.7 {RATIO} (ref 0.8–1.7)
ALP SERPL-CCNC: 79 U/L (ref 45–117)
ALT SERPL-CCNC: 14 U/L (ref 13–56)
ANION GAP SERPL CALC-SCNC: 8 MMOL/L (ref 3–18)
APPEARANCE UR: CLEAR
AST SERPL-CCNC: 24 U/L (ref 15–37)
BACTERIA URNS QL MICRO: NEGATIVE /HPF
BASOPHILS # BLD: 0 K/UL (ref 0–0.06)
BASOPHILS NFR BLD: 0 % (ref 0–2)
BILIRUB SERPL-MCNC: 0.5 MG/DL (ref 0.2–1)
BILIRUB UR QL: NEGATIVE
BNP SERPL-MCNC: 851 PG/ML (ref 0–900)
BUN SERPL-MCNC: 14 MG/DL (ref 7–18)
BUN/CREAT SERPL: 9 (ref 12–20)
CALCIUM SERPL-MCNC: 8.7 MG/DL (ref 8.5–10.1)
CHLORIDE SERPL-SCNC: 102 MMOL/L (ref 100–108)
CO2 SERPL-SCNC: 30 MMOL/L (ref 21–32)
COLOR UR: YELLOW
CREAT SERPL-MCNC: 1.63 MG/DL (ref 0.6–1.3)
DIFFERENTIAL METHOD BLD: ABNORMAL
EOSINOPHIL # BLD: 0.2 K/UL (ref 0–0.4)
EOSINOPHIL NFR BLD: 5 % (ref 0–5)
EPITH CASTS URNS QL MICRO: NORMAL /LPF (ref 0–5)
ERYTHROCYTE [DISTWIDTH] IN BLOOD BY AUTOMATED COUNT: 17.3 % (ref 11.6–14.5)
GLOBULIN SER CALC-MCNC: 5 G/DL (ref 2–4)
GLUCOSE SERPL-MCNC: 82 MG/DL (ref 74–99)
GLUCOSE UR STRIP.AUTO-MCNC: NEGATIVE MG/DL
HCT VFR BLD AUTO: 31.4 % (ref 35–45)
HGB BLD-MCNC: 9.7 G/DL (ref 12–16)
HGB UR QL STRIP: NEGATIVE
KETONES UR QL STRIP.AUTO: ABNORMAL MG/DL
LEUKOCYTE ESTERASE UR QL STRIP.AUTO: ABNORMAL
LYMPHOCYTES # BLD: 1.2 K/UL (ref 0.9–3.6)
LYMPHOCYTES NFR BLD: 23 % (ref 21–52)
MCH RBC QN AUTO: 20.3 PG (ref 24–34)
MCHC RBC AUTO-ENTMCNC: 30.9 G/DL (ref 31–37)
MCV RBC AUTO: 65.6 FL (ref 74–97)
MONOCYTES # BLD: 0.6 K/UL (ref 0.05–1.2)
MONOCYTES NFR BLD: 11 % (ref 3–10)
NEUTS SEG # BLD: 3.1 K/UL (ref 1.8–8)
NEUTS SEG NFR BLD: 61 % (ref 40–73)
NITRITE UR QL STRIP.AUTO: NEGATIVE
PH UR STRIP: 5.5 [PH] (ref 5–8)
PLATELET # BLD AUTO: 216 K/UL (ref 135–420)
POTASSIUM SERPL-SCNC: 3.5 MMOL/L (ref 3.5–5.5)
PROT SERPL-MCNC: 8.3 G/DL (ref 6.4–8.2)
PROT UR STRIP-MCNC: 30 MG/DL
RBC # BLD AUTO: 4.79 M/UL (ref 4.2–5.3)
RBC #/AREA URNS HPF: 0 /HPF (ref 0–5)
SODIUM SERPL-SCNC: 140 MMOL/L (ref 136–145)
SP GR UR REFRACTOMETRY: 1.02 (ref 1–1.03)
UROBILINOGEN UR QL STRIP.AUTO: 1 EU/DL (ref 0.2–1)
WBC # BLD AUTO: 5.1 K/UL (ref 4.6–13.2)
WBC URNS QL MICRO: NORMAL /HPF (ref 0–4)

## 2018-05-14 PROCEDURE — 81001 URINALYSIS AUTO W/SCOPE: CPT | Performed by: EMERGENCY MEDICINE

## 2018-05-14 PROCEDURE — 74011250637 HC RX REV CODE- 250/637: Performed by: EMERGENCY MEDICINE

## 2018-05-14 PROCEDURE — 85025 COMPLETE CBC W/AUTO DIFF WBC: CPT | Performed by: EMERGENCY MEDICINE

## 2018-05-14 PROCEDURE — 99283 EMERGENCY DEPT VISIT LOW MDM: CPT

## 2018-05-14 PROCEDURE — 83880 ASSAY OF NATRIURETIC PEPTIDE: CPT | Performed by: EMERGENCY MEDICINE

## 2018-05-14 PROCEDURE — 80053 COMPREHEN METABOLIC PANEL: CPT | Performed by: EMERGENCY MEDICINE

## 2018-05-14 RX ORDER — ACETAMINOPHEN 325 MG/1
650 TABLET ORAL
Qty: 20 TAB | Refills: 0 | Status: SHIPPED | OUTPATIENT
Start: 2018-05-14

## 2018-05-14 RX ORDER — FUROSEMIDE 40 MG/1
40 TABLET ORAL DAILY
Qty: 20 TAB | Refills: 0 | Status: SHIPPED | OUTPATIENT
Start: 2018-05-14 | End: 2018-06-03

## 2018-05-14 RX ORDER — CYCLOBENZAPRINE HCL 10 MG
5 TABLET ORAL
Status: COMPLETED | OUTPATIENT
Start: 2018-05-14 | End: 2018-05-14

## 2018-05-14 RX ORDER — CYCLOBENZAPRINE HCL 5 MG
5 TABLET ORAL
Qty: 12 TAB | Refills: 0 | Status: SHIPPED | OUTPATIENT
Start: 2018-05-14 | End: 2020-04-10

## 2018-05-14 RX ORDER — IBUPROFEN 600 MG/1
600 TABLET ORAL
Status: COMPLETED | OUTPATIENT
Start: 2018-05-14 | End: 2018-05-14

## 2018-05-14 RX ADMIN — IBUPROFEN 600 MG: 600 TABLET ORAL at 16:18

## 2018-05-14 RX ADMIN — CYCLOBENZAPRINE HYDROCHLORIDE 5 MG: 10 TABLET, FILM COATED ORAL at 16:19

## 2018-05-14 NOTE — DISCHARGE INSTRUCTIONS
Learning About How to Have a Healthy Back  What causes back pain? Back pain is often caused by overuse, strain, or injury. For example, people often hurt their backs playing sports or working in the yard, being jolted in a car accident, or lifting something too heavy. Aging plays a part too. Your bones and muscles tend to lose strength as you age, which makes injury more likely. The spongy discs between the bones of the spine (vertebrae) may suffer from wear and tear and no longer provide enough cushion between the bones. A disc that bulges or breaks open (herniated disc) can press on nerves, causing back pain. In some people, back pain is the result of arthritis, broken vertebrae caused by bone loss (osteoporosis), illness, or a spine problem. Although most people have back pain at one time or another, there are steps you can take to make it less likely. How can you have a healthy back? Reduce stress on your back through good posture  Slumping or slouching alone may not cause low back pain. But after the back has been strained or injured, bad posture can make pain worse. · Sleep in a position that maintains your back's normal curves and on a mattress that feels comfortable. Sleep on your side with a pillow between your knees, or sleep on your back with a pillow under your knees. These positions can reduce strain on your back. · Stand and sit up straight. \"Good posture\" generally means your ears, shoulders, and hips are in a straight line. · If you must stand for a long time, put one foot on a stool, ledge, or box. Switch feet every now and then. · Sit in a chair that is low enough to let you place both feet flat on the floor with both knees nearly level with your hips. If your chair or desk is too high, use a footrest to raise your knees. Place a small pillow, a rolled-up towel, or a lumbar roll in the curve of your back if you need extra support.   · Try a kneeling chair, which helps tilt your hips forward. This takes pressure off your lower back. · Try sitting on an exercise ball. It can rock from side to side, which helps keep your back loose. · When driving, keep your knees nearly level with your hips. Sit straight, and drive with both hands on the steering wheel. Your arms should be in a slightly bent position. Reduce stress on your back through careful lifting  · Squat down, bending at the hips and knees only. If you need to, put one knee to the floor and extend your other knee in front of you, bent at a right angle (half kneeling). · Press your chest straight forward. This helps keep your upper back straight while keeping a slight arch in your low back. · Hold the load as close to your body as possible, at the level of your belly button (navel). · Use your feet to change direction, taking small steps. · Lead with your hips as you change direction. Keep your shoulders in line with your hips as you move. · Set down your load carefully, squatting with your knees and hips only. Exercise and stretch your back  · Do some exercise on most days of the week, if your doctor says it is okay. You can walk, run, swim, or cycle. · Stretch your back muscles. Here are a few exercises to try:  Jenny Sermons on your back, and gently pull one bent knee to your chest. Put that foot back on the floor, and then pull the other knee to your chest.  ¨ Do pelvic tilts. Lie on your back with your knees bent. Tighten your stomach muscles. Pull your belly button (navel) in and up toward your ribs. You should feel like your back is pressing to the floor and your hips and pelvis are slightly lifting off the floor. Hold for 6 seconds while breathing smoothly. ¨ Sit with your back flat against a wall. · Keep your core muscles strong. The muscles of your back, belly (abdomen), and buttocks support your spine. ¨ Pull in your belly and imagine pulling your navel toward your spine. Hold this for 6 seconds, then relax.  Remember to keep breathing normally as you tense your muscles. ¨ Do curl-ups. Always do them with your knees bent. Keep your low back on the floor, and curl your shoulders toward your knees using a smooth, slow motion. Keep your arms folded across your chest. If this bothers your neck, try putting your hands behind your neck (not your head), with your elbows spread apart. ¨ Lie on your back with your knees bent and your feet flat on the floor. Tighten your belly muscles, and then push with your feet and raise your buttocks up a few inches. Hold this position 6 seconds as you continue to breathe normally, then lower yourself slowly to the floor. Repeat 8 to 12 times. ¨ If you like group exercise, try Pilates or yoga. These classes have poses that strengthen the core muscles. Lead a healthy lifestyle  · Stay at a healthy weight to avoid strain on your back. · Do not smoke. Smoking increases the risk of osteoporosis, which weakens the spine. If you need help quitting, talk to your doctor about stop-smoking programs and medicines. These can increase your chances of quitting for good. Where can you learn more? Go to http://martymyLINGOchente.info/. Enter L315 in the search box to learn more about \"Learning About How to Have a Healthy Back. \"  Current as of: March 21, 2017  Content Version: 11.4  © 0976-3239 [employer]. Care instructions adapted under license by [employer] (which disclaims liability or warranty for this information). If you have questions about a medical condition or this instruction, always ask your healthcare professional. [place] [address] any warranty or liability for your use of this information. Leg and Ankle Edema: Care Instructions  Your Care Instructions  Swelling in the legs, ankles, and feet is called edema. It is common after you sit or stand for a while. Long plane flights or car rides often cause swelling in the legs and feet.  You may also have swelling if you have to stand for long periods of time at your job. Problems with the veins in the legs (varicose veins) and changes in hormones can also cause swelling. Sometimes the swelling in the ankles and feet is caused by a more serious problem, such as heart failure, infection, blood clots, or liver or kidney disease. Follow-up care is a key part of your treatment and safety. Be sure to make and go to all appointments, and call your doctor if you are having problems. It's also a good idea to know your test results and keep a list of the medicines you take. How can you care for yourself at home? · If your doctor gave you medicine, take it as prescribed. Call your doctor if you think you are having a problem with your medicine. · Whenever you are resting, raise your legs up. Try to keep the swollen area higher than the level of your heart. · Take breaks from standing or sitting in one position. ¨ Walk around to increase the blood flow in your lower legs. ¨ Move your feet and ankles often while you stand, or tighten and relax your leg muscles. · Wear support stockings. Put them on in the morning, before swelling gets worse. · Eat a balanced diet. Lose weight if you need to. · Limit the amount of salt (sodium) in your diet. Salt holds fluid in the body and may increase swelling. When should you call for help? Call 911 anytime you think you may need emergency care. For example, call if:  ? · You have symptoms of a blood clot in your lung (called a pulmonary embolism). These may include:  ¨ Sudden chest pain. ¨ Trouble breathing. ¨ Coughing up blood. ?Call your doctor now or seek immediate medical care if:  ? · You have signs of a blood clot, such as:  ¨ Pain in your calf, back of the knee, thigh, or groin. ¨ Redness and swelling in your leg or groin. ? · You have symptoms of infection, such as:  ¨ Increased pain, swelling, warmth, or redness. ¨ Red streaks or pus. ¨ A fever. ?Watch closely for changes in your health, and be sure to contact your doctor if:  ? · Your swelling is getting worse. ? · You have new or worsening pain in your legs. ? · You do not get better as expected. Where can you learn more? Go to http://marty-chente.info/. Enter V757 in the search box to learn more about \"Leg and Ankle Edema: Care Instructions. \"  Current as of: March 20, 2017  Content Version: 11.4  © 5476-4904 [employer]. Care instructions adapted under license by [employer] (which disclaims liability or warranty for this information). If you have questions about a medical condition or this instruction, always ask your healthcare professional. [address] any warranty or liability for your use of this information.

## 2018-05-14 NOTE — ED PROVIDER NOTES
EMERGENCY DEPARTMENT HISTORY AND PHYSICAL EXAM    3:45 PM      Date: 5/14/2018  Patient Name: Jill Kraus    History of Presenting Illness     Chief Complaint   Patient presents with    Ankle swelling    Back Pain         History Provided By: Patient    Chief Complaint: Back pain  Duration: 3 Days  Timing:  Constant  Location: Lower  Quality: similar to pain associated with period  Severity: 8 out of 10  Modifying Factors: none reported  Associated Symptoms: diarrhea      Additional History (Context): Jill Kraus is a 46 y.o. female with diabetes, hypertension and CKD who presents with lower back pain x3 days. Pt denies trauma or fall, incontinence, focal weakness or numbness. No hx prior. Associated sx include diarrhea x1. Denies N/V, dysuria. Pt also c/o b/l leg swelling x7 months. No hx renal calculi. PCP: Camila Bass NP    Current Outpatient Prescriptions   Medication Sig Dispense Refill    furosemide (LASIX) 40 mg tablet Take 1 Tab by mouth daily for 20 days. 20 Tab 0    acetaminophen (TYLENOL) 325 mg tablet Take 2 Tabs by mouth every four (4) hours as needed for Pain. 20 Tab 0    cyclobenzaprine (FLEXERIL) 5 mg tablet Take 1 Tab by mouth three (3) times daily as needed for Muscle Spasm(s). 12 Tab 0    Comp. Stocking,Thigh,Long,Large misc 1 Package by Apply Externally route once for 1 dose. 1 Package 0    budesonide-formoterol (SYMBICORT) 160-4.5 mcg/actuation HFAA Take 2 Puffs by inhalation two (2) times a day. 1 Inhaler 5    MULTIVITAMIN PO Take  by mouth.  levoFLOXacin (LEVAQUIN) 750 mg tablet Take 1 Tab by mouth daily. 5 Tab 0    acyclovir (ZOVIRAX) 400 mg tablet Take 400 mg by mouth daily.  ferrous sulfate 324 mg (65 mg iron) tablet Take 324 mg by mouth Daily (before breakfast).  venlafaxine-SR (EFFEXOR-XR) 150 mg capsule Take  by mouth daily.  labetalol (NORMODYNE) 200 mg tablet Take 300 mg by mouth two (2) times a day.       simvastatin (ZOCOR) 20 mg tablet Take 20 mg by mouth nightly.  NIFEdipine CR (ADALAT CC) 90 mg CR tablet Take 90 mg by mouth daily. Past History     Past Medical History:  Past Medical History:   Diagnosis Date    Anemia     Bone lesion     Chronic kidney disease     Depressed     [facility] [address]     Diabetes mellitus ([facility] [address]     Hypertension     Kidney problem     Pneumonia     Sarcoidosis     Thyroid disorder     Uterus problem        Past Surgical History:  Past Surgical History:   Procedure Laterality Date    HX  SECTION      HX OTHER SURGICAL      neck biopsy    HX TUBAL LIGATION         Family History:  Family History   Problem Relation Age of Onset    Hypertension Mother     Diabetes Mother     Cancer Mother    [employer] Cancer Sister        Social History:  Social History   Substance Use Topics    Smoking status: Former Smoker     Types: Cigarettes    Smokeless tobacco: Never Used    Alcohol use Yes      Comment: beer rarely       Allergies:  No Known Allergies      Review of Systems       Review of Systems   Constitutional: Negative for activity change, fatigue and fever. HENT: Negative for congestion and rhinorrhea. Eyes: Negative for visual disturbance. Respiratory: Negative for shortness of breath. Cardiovascular: Positive for leg swelling (b/l). Negative for chest pain and palpitations. Gastrointestinal: Positive for diarrhea. Negative for abdominal pain, nausea and vomiting. Genitourinary: Negative for dysuria and hematuria. Negative for incontinence   Musculoskeletal: Positive for back pain (lower). Skin: Negative for rash. Neurological: Negative for dizziness, weakness and light-headedness. Psychiatric/Behavioral: Negative for agitation. All other systems reviewed and are negative.         Physical Exam     Visit Vitals    /89 (BP 1 Location: Right arm, BP Patient Position: At rest)    Pulse 73    Temp 98.3 °F (36.8 °C)    Resp 18    Ht 5' 3\" (1.6 m)    Wt 154.2 kg (340 lb)    SpO2 98%    BMI 60.23 kg/m2         Physical Exam   Constitutional: She appears well-developed and well-nourished. No distress. HENT:   Head: Normocephalic and atraumatic. Right Ear: External ear normal.   Left Ear: External ear normal.   Nose: Nose normal.   Mouth/Throat: Oropharynx is clear and moist.   Eyes: Conjunctivae and EOM are normal. Pupils are equal, round, and reactive to light. No scleral icterus. Neck: Normal range of motion. Neck supple. No JVD present. No tracheal deviation present. No thyromegaly present. Cardiovascular: Normal rate and regular rhythm. Exam reveals no friction rub. No murmur heard. Pulses:       Dorsalis pedis pulses are 2+ on the right side, and 2+ on the left side. Less than 2 sec cap refill b/l   Pulmonary/Chest: Effort normal and breath sounds normal. No stridor. She exhibits no tenderness. Abdominal: Soft. Bowel sounds are normal. She exhibits no distension. There is no tenderness. There is no rebound and no guarding. Musculoskeletal: Normal range of motion. She exhibits no edema or tenderness. Acute on chronic significant b/l LE edema  Mild L flank tenderness   Lymphadenopathy:     She has no cervical adenopathy. Neurological: She is alert. No cranial nerve deficit. Coordination normal.   Skin: Skin is warm and dry. Stage 1 ulcers anterior shin b/l  Pink skin    Psychiatric: She has a normal mood and affect. Her behavior is normal. Judgment and thought content normal.   Nursing note and vitals reviewed.         Diagnostic Study Results     Labs -  Recent Results (from the past 12 hour(s))   URINALYSIS W/ RFLX MICROSCOPIC    Collection Time: 05/14/18  3:29 PM   Result Value Ref Range    Color YELLOW      Appearance CLEAR      Specific gravity 1.018 1.005 - 1.030      pH (UA) 5.5 5.0 - 8.0      Protein 30 (A) NEG mg/dL    Glucose NEGATIVE  NEG mg/dL    Ketone TRACE (A) NEG mg/dL    Bilirubin NEGATIVE  NEG      Blood NEGATIVE  NEG Urobilinogen 1.0 0.2 - 1.0 EU/dL    Nitrites NEGATIVE  NEG      Leukocyte Esterase TRACE (A) NEG     URINE MICROSCOPIC ONLY    Collection Time: 05/14/18  3:29 PM   Result Value Ref Range    WBC 0 to 2 0 - 4 /hpf    RBC 0 0 - 5 /hpf    Epithelial cells 1+ 0 - 5 /lpf    Bacteria NEGATIVE  NEG /hpf   CBC WITH AUTOMATED DIFF    Collection Time: 05/14/18  4:05 PM   Result Value Ref Range    WBC 5.1 4.6 - 13.2 K/uL    RBC 4.79 4.20 - 5.30 M/uL    HGB 9.7 (L) 12.0 - 16.0 g/dL    HCT 31.4 (L) 35.0 - 45.0 %    MCV 65.6 (L) 74.0 - 97.0 FL    MCH 20.3 (L) 24.0 - 34.0 PG    MCHC 30.9 (L) 31.0 - 37.0 g/dL    RDW 17.3 (H) 11.6 - 14.5 %    PLATELET 176 960 - 610 K/uL    NEUTROPHILS 61 40 - 73 %    LYMPHOCYTES 23 21 - 52 %    MONOCYTES 11 (H) 3 - 10 %    EOSINOPHILS 5 0 - 5 %    BASOPHILS 0 0 - 2 %    ABS. NEUTROPHILS 3.1 1.8 - 8.0 K/UL    ABS. LYMPHOCYTES 1.2 0.9 - 3.6 K/UL    ABS. MONOCYTES 0.6 0.05 - 1.2 K/UL    ABS. EOSINOPHILS 0.2 0.0 - 0.4 K/UL    ABS. BASOPHILS 0.0 0.0 - 0.06 K/UL    DF AUTOMATED     METABOLIC PANEL, COMPREHENSIVE    Collection Time: 05/14/18  4:05 PM   Result Value Ref Range    Sodium 140 136 - 145 mmol/L    Potassium 3.5 3.5 - 5.5 mmol/L    Chloride 102 100 - 108 mmol/L    CO2 30 21 - 32 mmol/L    Anion gap 8 3.0 - 18 mmol/L    Glucose 82 74 - 99 mg/dL    BUN 14 7.0 - 18 MG/DL    Creatinine 1.63 (H) 0.6 - 1.3 MG/DL    BUN/Creatinine ratio 9 (L) 12 - 20      GFR est AA 40 (L) >60 ml/min/1.73m2    GFR est non-AA 33 (L) >60 ml/min/1.73m2    Calcium 8.7 8.5 - 10.1 MG/DL    Bilirubin, total 0.5 0.2 - 1.0 MG/DL    ALT (SGPT) 14 13 - 56 U/L    AST (SGOT) 24 15 - 37 U/L    Alk.  phosphatase 79 45 - 117 U/L    Protein, total 8.3 (H) 6.4 - 8.2 g/dL    Albumin 3.3 (L) 3.4 - 5.0 g/dL    Globulin 5.0 (H) 2.0 - 4.0 g/dL    A-G Ratio 0.7 (L) 0.8 - 1.7     NT-PRO BNP    Collection Time: 05/14/18  4:05 PM   Result Value Ref Range    NT pro- 0 - 900 PG/ML         Medical Decision Making   I am the first provider for this patient. I reviewed the vital signs, available nursing notes, past medical history, past surgical history, family history and social history. Vital Signs-Reviewed the patient's vital signs. Pulse Oximetry Analysis -  98% on room air (Interpretation) nonhypoxic     Cardiac Monitor:  Rate: 73        Records Reviewed: Nursing Notes (Time of Review: 3:36 PM)    ED Course: Progress Notes, Reevaluation, and Consults:    17:16 Re-evaluated pt who feels better, comfortable going home and would like compressions stockings to go home with. Will follow up. Provider Notes (Medical Decision Making):     Pt with back pain, L flank ttp, no trauma, no midline bony tenderness, no hx kidney stones. R/o UTI, sx treatment. Per pt, this is acute on chronic pain. Muscle relaxant and follow up. Diagnosis     Clinical Impression:   1. Chronic right-sided low back pain without sciatica    2. Peripheral edema        Disposition: Discharge    Follow-up Information     Follow up With Details Comments [address] [place] [address], NP Call in 1 day Follow Up From Emergency Department [address] [place] [address]  [phone]      [facility] EMERGENCY DEPT  If symptoms worsen [address] [address] [phone]           Patient's Medications   Start Taking    ACETAMINOPHEN (TYLENOL) 325 MG TABLET    Take 2 Tabs by mouth every four (4) hours as needed for Pain. COMP. STOCKING,THIGH,LONG,LARGE MISC    1 Package by Apply Externally route once for 1 dose. CYCLOBENZAPRINE (FLEXERIL) 5 MG TABLET    Take 1 Tab by mouth three (3) times daily as needed for Muscle Spasm(s). FUROSEMIDE (LASIX) 40 MG TABLET    Take 1 Tab by mouth daily for 20 days. Continue Taking    ACYCLOVIR (ZOVIRAX) 400 MG TABLET    Take 400 mg by mouth daily. BUDESONIDE-FORMOTEROL (SYMBICORT) 160-4.5 MCG/ACTUATION HFAA    Take 2 Puffs by inhalation two (2) times a day.     FERROUS SULFATE 324 MG (65 MG IRON) TABLET Take 324 mg by mouth Daily (before breakfast). LABETALOL (NORMODYNE) 200 MG TABLET    Take 300 mg by mouth two (2) times a day. LEVOFLOXACIN (LEVAQUIN) 750 MG TABLET    Take 1 Tab by mouth daily. MULTIVITAMIN PO    Take  by mouth. NIFEDIPINE CR (ADALAT CC) 90 MG CR TABLET    Take 90 mg by mouth daily. SIMVASTATIN (ZOCOR) 20 MG TABLET    Take 20 mg by mouth nightly. VENLAFAXINE-SR (EFFEXOR-XR) 150 MG CAPSULE    Take  by mouth daily. These Medications have changed    No medications on file   Stop Taking    No medications on file     _______________________________    Attestations:  [address] [place] [address] acting as a scribe for and in the presence of Jose A Uriostegui MD      May 14, 2018 at 5:20 PM       Provider Attestation:      I personally performed the services described in the documentation, reviewed the documentation, as recorded by the scribe in my presence, and it accurately and completely records my words and actions.  May 14, 2018 at 5:20 PM - Jose A Uriostegui MD    _______________________________

## 2018-07-28 ENCOUNTER — HOSPITAL ENCOUNTER (INPATIENT)
Age: 48
LOS: 4 days | Discharge: HOME | DRG: 720 | End: 2018-08-01
Attending: EMERGENCY MEDICINE | Admitting: INTERNAL MEDICINE
Payer: MEDICAID

## 2018-07-28 ENCOUNTER — APPOINTMENT (OUTPATIENT)
Dept: GENERAL RADIOLOGY | Age: 48
DRG: 720 | End: 2018-07-28
Attending: PHYSICIAN ASSISTANT
Payer: MEDICAID

## 2018-07-28 DIAGNOSIS — A41.9 SEPSIS, DUE TO UNSPECIFIED ORGANISM: Primary | ICD-10-CM

## 2018-07-28 DIAGNOSIS — D64.9 ANEMIA, UNSPECIFIED TYPE: ICD-10-CM

## 2018-07-28 DIAGNOSIS — L03.115 CELLULITIS OF RIGHT LOWER EXTREMITY: ICD-10-CM

## 2018-07-28 DIAGNOSIS — N17.9 AKI (ACUTE KIDNEY INJURY) (HCC): ICD-10-CM

## 2018-07-28 PROBLEM — Q82.0 HEREDITARY LYMPHEDEMA OF LEGS: Status: ACTIVE | Noted: 2018-07-28

## 2018-07-28 LAB
ALBUMIN SERPL-MCNC: 2.8 G/DL (ref 3.4–5)
ALBUMIN/GLOB SERPL: 0.5 {RATIO} (ref 0.8–1.7)
ALP SERPL-CCNC: 69 U/L (ref 45–117)
ALT SERPL-CCNC: 11 U/L (ref 13–56)
ANION GAP SERPL CALC-SCNC: 9 MMOL/L (ref 3–18)
APPEARANCE UR: CLEAR
AST SERPL-CCNC: 20 U/L (ref 15–37)
BACTERIA URNS QL MICRO: ABNORMAL /HPF
BASOPHILS # BLD: 0 K/UL (ref 0–0.1)
BASOPHILS NFR BLD: 0 % (ref 0–2)
BILIRUB SERPL-MCNC: 0.6 MG/DL (ref 0.2–1)
BILIRUB UR QL: NEGATIVE
BUN SERPL-MCNC: 16 MG/DL (ref 7–18)
BUN/CREAT SERPL: 8 (ref 12–20)
CALCIUM SERPL-MCNC: 8.1 MG/DL (ref 8.5–10.1)
CHLORIDE SERPL-SCNC: 100 MMOL/L (ref 100–108)
CO2 SERPL-SCNC: 26 MMOL/L (ref 21–32)
COLOR UR: YELLOW
CREAT SERPL-MCNC: 2.07 MG/DL (ref 0.6–1.3)
DIFFERENTIAL METHOD BLD: ABNORMAL
EOSINOPHIL # BLD: 0 K/UL (ref 0–0.4)
EOSINOPHIL NFR BLD: 0 % (ref 0–5)
EPITH CASTS URNS QL MICRO: ABNORMAL /LPF (ref 0–5)
ERYTHROCYTE [DISTWIDTH] IN BLOOD BY AUTOMATED COUNT: 19.8 % (ref 11.6–14.5)
GLOBULIN SER CALC-MCNC: 5.1 G/DL (ref 2–4)
GLUCOSE BLD STRIP.AUTO-MCNC: 101 MG/DL (ref 70–110)
GLUCOSE BLD STRIP.AUTO-MCNC: 120 MG/DL (ref 70–110)
GLUCOSE SERPL-MCNC: 90 MG/DL (ref 74–99)
GLUCOSE UR STRIP.AUTO-MCNC: NEGATIVE MG/DL
HCT VFR BLD AUTO: 28.9 % (ref 35–45)
HGB BLD-MCNC: 9.1 G/DL (ref 12–16)
HGB UR QL STRIP: ABNORMAL
KETONES UR QL STRIP.AUTO: NEGATIVE MG/DL
LACTATE BLD-SCNC: 1.7 MMOL/L (ref 0.4–2)
LEUKOCYTE ESTERASE UR QL STRIP.AUTO: NEGATIVE
LYMPHOCYTES # BLD: 0.5 K/UL (ref 0.9–3.6)
LYMPHOCYTES NFR BLD: 2 % (ref 21–52)
MCH RBC QN AUTO: 21.9 PG (ref 24–34)
MCHC RBC AUTO-ENTMCNC: 31.5 G/DL (ref 31–37)
MCV RBC AUTO: 69.5 FL (ref 74–97)
MONOCYTES # BLD: 1.1 K/UL (ref 0.05–1.2)
MONOCYTES NFR BLD: 5 % (ref 3–10)
NEUTS SEG # BLD: 19.9 K/UL (ref 1.8–8)
NEUTS SEG NFR BLD: 93 % (ref 40–73)
NITRITE UR QL STRIP.AUTO: NEGATIVE
PH UR STRIP: 6 [PH] (ref 5–8)
PLATELET # BLD AUTO: 192 K/UL (ref 135–420)
PMV BLD AUTO: 9.7 FL (ref 9.2–11.8)
POTASSIUM SERPL-SCNC: 3.7 MMOL/L (ref 3.5–5.5)
PROT SERPL-MCNC: 7.9 G/DL (ref 6.4–8.2)
PROT UR STRIP-MCNC: 300 MG/DL
RBC # BLD AUTO: 4.16 M/UL (ref 4.2–5.3)
RBC #/AREA URNS HPF: ABNORMAL /HPF (ref 0–5)
SODIUM SERPL-SCNC: 135 MMOL/L (ref 136–145)
SP GR UR REFRACTOMETRY: 1.01 (ref 1–1.03)
UROBILINOGEN UR QL STRIP.AUTO: 0.2 EU/DL (ref 0.2–1)
WBC # BLD AUTO: 21.6 K/UL (ref 4.6–13.2)
WBC URNS QL MICRO: ABNORMAL /HPF (ref 0–4)

## 2018-07-28 PROCEDURE — 77030038269 HC DRN EXT URIN PURWCK BARD -A

## 2018-07-28 PROCEDURE — 94664 DEMO&/EVAL PT USE INHALER: CPT

## 2018-07-28 PROCEDURE — 85025 COMPLETE CBC W/AUTO DIFF WBC: CPT | Performed by: PHYSICIAN ASSISTANT

## 2018-07-28 PROCEDURE — 99285 EMERGENCY DEPT VISIT HI MDM: CPT

## 2018-07-28 PROCEDURE — 74011000258 HC RX REV CODE- 258: Performed by: PHYSICIAN ASSISTANT

## 2018-07-28 PROCEDURE — 96365 THER/PROPH/DIAG IV INF INIT: CPT

## 2018-07-28 PROCEDURE — 83605 ASSAY OF LACTIC ACID: CPT

## 2018-07-28 PROCEDURE — 96361 HYDRATE IV INFUSION ADD-ON: CPT

## 2018-07-28 PROCEDURE — 65660000000 HC RM CCU STEPDOWN

## 2018-07-28 PROCEDURE — 81001 URINALYSIS AUTO W/SCOPE: CPT | Performed by: PHYSICIAN ASSISTANT

## 2018-07-28 PROCEDURE — 71045 X-RAY EXAM CHEST 1 VIEW: CPT

## 2018-07-28 PROCEDURE — 74011250636 HC RX REV CODE- 250/636: Performed by: INTERNAL MEDICINE

## 2018-07-28 PROCEDURE — 87077 CULTURE AEROBIC IDENTIFY: CPT | Performed by: PHYSICIAN ASSISTANT

## 2018-07-28 PROCEDURE — 74011000258 HC RX REV CODE- 258: Performed by: INTERNAL MEDICINE

## 2018-07-28 PROCEDURE — 74011250637 HC RX REV CODE- 250/637: Performed by: PHYSICIAN ASSISTANT

## 2018-07-28 PROCEDURE — 94640 AIRWAY INHALATION TREATMENT: CPT

## 2018-07-28 PROCEDURE — 74011250637 HC RX REV CODE- 250/637: Performed by: INTERNAL MEDICINE

## 2018-07-28 PROCEDURE — 74011250636 HC RX REV CODE- 250/636: Performed by: EMERGENCY MEDICINE

## 2018-07-28 PROCEDURE — 96366 THER/PROPH/DIAG IV INF ADDON: CPT

## 2018-07-28 PROCEDURE — 80053 COMPREHEN METABOLIC PANEL: CPT | Performed by: PHYSICIAN ASSISTANT

## 2018-07-28 PROCEDURE — 74011000250 HC RX REV CODE- 250: Performed by: INTERNAL MEDICINE

## 2018-07-28 PROCEDURE — 87040 BLOOD CULTURE FOR BACTERIA: CPT | Performed by: PHYSICIAN ASSISTANT

## 2018-07-28 PROCEDURE — 74011250636 HC RX REV CODE- 250/636: Performed by: PHYSICIAN ASSISTANT

## 2018-07-28 PROCEDURE — 93005 ELECTROCARDIOGRAM TRACING: CPT

## 2018-07-28 PROCEDURE — 82962 GLUCOSE BLOOD TEST: CPT

## 2018-07-28 RX ORDER — LABETALOL 100 MG/1
300 TABLET, FILM COATED ORAL 2 TIMES DAILY
Status: DISCONTINUED | OUTPATIENT
Start: 2018-07-28 | End: 2018-08-01 | Stop reason: HOSPADM

## 2018-07-28 RX ORDER — IPRATROPIUM BROMIDE AND ALBUTEROL SULFATE 2.5; .5 MG/3ML; MG/3ML
3 SOLUTION RESPIRATORY (INHALATION)
Status: DISCONTINUED | OUTPATIENT
Start: 2018-07-28 | End: 2018-08-01 | Stop reason: HOSPADM

## 2018-07-28 RX ORDER — ACETAMINOPHEN 500 MG
1000 TABLET ORAL
Status: COMPLETED | OUTPATIENT
Start: 2018-07-28 | End: 2018-07-28

## 2018-07-28 RX ORDER — IBUPROFEN 200 MG
600 TABLET ORAL
Status: COMPLETED | OUTPATIENT
Start: 2018-07-28 | End: 2018-07-28

## 2018-07-28 RX ORDER — SODIUM CHLORIDE 9 MG/ML
100 INJECTION, SOLUTION INTRAVENOUS CONTINUOUS
Status: DISCONTINUED | OUTPATIENT
Start: 2018-07-28 | End: 2018-08-01 | Stop reason: HOSPADM

## 2018-07-28 RX ORDER — INSULIN LISPRO 100 [IU]/ML
INJECTION, SOLUTION INTRAVENOUS; SUBCUTANEOUS
Status: DISCONTINUED | OUTPATIENT
Start: 2018-07-28 | End: 2018-08-01 | Stop reason: HOSPADM

## 2018-07-28 RX ORDER — VENLAFAXINE HYDROCHLORIDE 150 MG/1
150 CAPSULE, EXTENDED RELEASE ORAL DAILY
Status: DISCONTINUED | OUTPATIENT
Start: 2018-07-29 | End: 2018-08-01 | Stop reason: HOSPADM

## 2018-07-28 RX ORDER — ACETAMINOPHEN 325 MG/1
650 TABLET ORAL
Status: DISCONTINUED | OUTPATIENT
Start: 2018-07-28 | End: 2018-08-01 | Stop reason: HOSPADM

## 2018-07-28 RX ORDER — SODIUM CHLORIDE 0.9 % (FLUSH) 0.9 %
5-10 SYRINGE (ML) INJECTION AS NEEDED
Status: DISCONTINUED | OUTPATIENT
Start: 2018-07-28 | End: 2018-08-01 | Stop reason: HOSPADM

## 2018-07-28 RX ORDER — FERROUS GLUCONATE 324(37.5)
324 TABLET ORAL
Status: DISCONTINUED | OUTPATIENT
Start: 2018-07-29 | End: 2018-08-01 | Stop reason: HOSPADM

## 2018-07-28 RX ORDER — HYDROCODONE BITARTRATE AND ACETAMINOPHEN 7.5; 325 MG/1; MG/1
1 TABLET ORAL
Status: DISCONTINUED | OUTPATIENT
Start: 2018-07-28 | End: 2018-08-01 | Stop reason: HOSPADM

## 2018-07-28 RX ORDER — SIMVASTATIN 20 MG/1
20 TABLET, FILM COATED ORAL
Status: DISCONTINUED | OUTPATIENT
Start: 2018-07-28 | End: 2018-08-01 | Stop reason: HOSPADM

## 2018-07-28 RX ORDER — HYDRALAZINE HYDROCHLORIDE 20 MG/ML
10 INJECTION INTRAMUSCULAR; INTRAVENOUS
Status: DISCONTINUED | OUTPATIENT
Start: 2018-07-28 | End: 2018-08-01 | Stop reason: HOSPADM

## 2018-07-28 RX ORDER — VANCOMYCIN 2 GRAM/500 ML IN 0.9 % SODIUM CHLORIDE INTRAVENOUS
2000 ONCE
Status: COMPLETED | OUTPATIENT
Start: 2018-07-28 | End: 2018-07-28

## 2018-07-28 RX ORDER — CYCLOBENZAPRINE HCL 10 MG
5 TABLET ORAL
Status: DISCONTINUED | OUTPATIENT
Start: 2018-07-28 | End: 2018-08-01 | Stop reason: HOSPADM

## 2018-07-28 RX ORDER — SODIUM CHLORIDE 0.9 % (FLUSH) 0.9 %
5-10 SYRINGE (ML) INJECTION EVERY 8 HOURS
Status: DISCONTINUED | OUTPATIENT
Start: 2018-07-28 | End: 2018-08-01 | Stop reason: HOSPADM

## 2018-07-28 RX ORDER — HEPARIN SODIUM 5000 [USP'U]/ML
5000 INJECTION, SOLUTION INTRAVENOUS; SUBCUTANEOUS EVERY 8 HOURS
Status: DISCONTINUED | OUTPATIENT
Start: 2018-07-28 | End: 2018-08-01 | Stop reason: HOSPADM

## 2018-07-28 RX ADMIN — PIPERACILLIN SODIUM,TAZOBACTAM SODIUM 3.38 G: 3; .375 INJECTION, POWDER, FOR SOLUTION INTRAVENOUS at 15:17

## 2018-07-28 RX ADMIN — SIMVASTATIN 20 MG: 20 TABLET, FILM COATED ORAL at 22:20

## 2018-07-28 RX ADMIN — HEPARIN SODIUM 5000 UNITS: 5000 INJECTION, SOLUTION INTRAVENOUS; SUBCUTANEOUS at 15:17

## 2018-07-28 RX ADMIN — ACETAMINOPHEN 650 MG: 325 TABLET, FILM COATED ORAL at 15:17

## 2018-07-28 RX ADMIN — CEFTRIAXONE 2 G: 2 INJECTION, POWDER, FOR SOLUTION INTRAMUSCULAR; INTRAVENOUS at 11:17

## 2018-07-28 RX ADMIN — ACETAMINOPHEN 1000 MG: 500 TABLET, FILM COATED ORAL at 11:12

## 2018-07-28 RX ADMIN — SODIUM CHLORIDE 1000 ML: 900 INJECTION, SOLUTION INTRAVENOUS at 11:13

## 2018-07-28 RX ADMIN — Medication 10 ML: at 20:44

## 2018-07-28 RX ADMIN — SODIUM CHLORIDE 100 ML/HR: 900 INJECTION, SOLUTION INTRAVENOUS at 20:41

## 2018-07-28 RX ADMIN — IPRATROPIUM BROMIDE AND ALBUTEROL SULFATE 3 ML: .5; 3 SOLUTION RESPIRATORY (INHALATION) at 19:59

## 2018-07-28 RX ADMIN — VANCOMYCIN HYDROCHLORIDE 2000 MG: 10 INJECTION, POWDER, LYOPHILIZED, FOR SOLUTION INTRAVENOUS at 12:45

## 2018-07-28 RX ADMIN — SODIUM CHLORIDE 710 ML: 900 INJECTION, SOLUTION INTRAVENOUS at 13:42

## 2018-07-28 RX ADMIN — IPRATROPIUM BROMIDE AND ALBUTEROL SULFATE 3 ML: .5; 3 SOLUTION RESPIRATORY (INHALATION) at 16:25

## 2018-07-28 RX ADMIN — Medication 10 ML: at 15:18

## 2018-07-28 RX ADMIN — PIPERACILLIN SODIUM,TAZOBACTAM SODIUM 3.38 G: 3; .375 INJECTION, POWDER, FOR SOLUTION INTRAVENOUS at 20:39

## 2018-07-28 RX ADMIN — IBUPROFEN 600 MG: 200 TABLET, FILM COATED ORAL at 18:09

## 2018-07-28 NOTE — ED NOTES
SEPSIS SUMMARY · TIME ZERO (triage time): [phone] · 2 Sets Blood Cultures Drawn PRIOR TO ANTIBIOTIC START? YES 
 
· Initial POC Lactic time drawn? 1110        Repeat drawn? NO  
 
· POC lactic acid results Lab Results Component Value Date [facility] 1.7 07/28/2018 ALL LACTIC ACIDS GREATER THAN 2 MUST BE REPEATED POC WITHIN 4 HOURS OR PRIOR TO ADMISSION · All ordered antibiotics initiated within first 3 hours of TIME ZERO? YES 
 
· Target fluid bolus 30ml/kg infused for Lactic Acid > 4 OR SYS < 90 and/or MAP < 65 x 2 reading? YES 
 
· TOTAL BOLUS ORDERED: 1710     BOLUS START TIME: 1113       BOLUS END TIME: 6647 · FLUIDS COMPLETED IN MAR: YES  
 
MONITOR FOR PERSISTENT HYPOTENSION BY CHECKING VITAL SIGNS EVERY 15 MINUTES DURING FLUID BOLUS AND 1 HOUR AFTER · Vital signs monitored and validated every 15 min during bolus? YES  
 
· Vital signs monitored and validated every 15 min x 1 hour after bolus complete? YES  
 
2 CONSECUTIVE SYSTOLIC READINGS < 90 AND/OR MAP < THAN 65, NOTIFY PROVIDER IMMEDIATELY FOR PRESSOR ORDER Vital signs:  Patient Vitals for the past 4 hrs: 
 Temp Pulse Resp BP SpO2  
07/28/18 1352 (!) 102.8 °F (39.3 °C) - - - -  
07/28/18 1330 - 86 (!) 34 129/75 94 %  
07/28/18 1300 - 85 23 111/61 96 %  
07/28/18 1242 (!) 102.9 °F (39.4 °C) - - - -  
07/28/18 1230 - 86 (!) 33 133/77 92 %  
07/28/18 1216 (!) 103.2 °F (39.6 °C) - - - -  
07/28/18 1215 - 87 (!) 32 136/81 92 %  
07/28/18 1200 - 90 (!) 35 129/79 92 %  
07/28/18 1145 - 96 29 153/80 93 % 07/28/18 1130 - 100 (!) 33 (!) 167/94 93 % 07/28/18 1115 (!) 103.2 °F (39.6 °C) (!) 101 25 (!) 167/94 95 %  
07/28/18 1059 (!) 103.2 °F (39.6 °C) (!) 101 18 131/76 100 % MAP (Monitor): 84    =monitored (data validate) MAP (Calculated): 118    =calculated (manual entry)

## 2018-07-28 NOTE — ED NOTES
TRANSFER - ED to INPATIENT REPORT: 
 
SBAR report made available to receiving floor on this patient being transferred to [address] [place] [address] ([facility])  for routine progression of care Admitting diagnosis Sepsis ([facility] Information from the following report(s) SBAR, ED Summary, [facility] and Recent Results was made available to receiving floor. Lines:  
Peripheral IV 07/28/18 Left Antecubital (Active) Site Assessment Clean, dry, & intact 7/28/2018 11:15 AM  
Phlebitis Assessment 0 7/28/2018 11:15 AM  
Infiltration Assessment 0 7/28/2018 11:15 AM  
Dressing Status Clean, dry, & intact 7/28/2018 11:15 AM  
Dressing Type Transparent 7/28/2018 11:15 AM  
  
 
Medication list confirmed with patient Opportunity for questions and clarification was provided. Patient is oriented to time, place, person and situation Patient is  continent and ambulatory with assist 
  
Valuables transported with patient Patient transported with: 
 Monitor Tech

## 2018-07-28 NOTE — IP AVS SNAPSHOT
[address] [address] 
[phone] Patient: Savannah Victoria MRN: [id number] VMA:9/89/9139 A check kiersten indicates which time of day the medication should be taken. My Medications START taking these medications Instructions Each Dose to Equal  
 Morning Noon Evening Bedtime  
 cephALEXin 500 mg capsule Commonly known as:  Reynaldo Frohlich Take 1 Cap by mouth two (2) times a day for 10 days. 500 mg HYDROcodone-acetaminophen 7.5-325 mg per tablet Commonly known as:  Barnet Cuff Take 1 Tab by mouth every six (6) hours as needed. Max Daily Amount: 4 Tabs. 1 Tab CONTINUE taking these medications Instructions Each Dose to Equal  
 Morning Noon Evening Bedtime  
 acetaminophen 325 mg tablet Commonly known as:  TYLENOL Take 2 Tabs by mouth every four (4) hours as needed for Pain. 650 mg  
    
   
   
   
  
 acyclovir 400 mg tablet Commonly known as:  ZOVIRAX Your next dose is:  tomorrow Take 400 mg by mouth daily. 400 mg  
    
  
   
   
   
  
 budesonide-formoterol 160-4.5 mcg/actuation Hfaa Commonly known as:  SYMBICORT Your next dose is:  tonight Take 2 Puffs by inhalation two (2) times a day. 2 Puff  
    
   
   
  
   
  
 cyclobenzaprine 5 mg tablet Commonly known as:  FLEXERIL Take 1 Tab by mouth three (3) times daily as needed for Muscle Spasm(s). 5 mg  
    
   
   
   
  
 ferrous sulfate 324 mg (65 mg iron) tablet Your next dose is:  tomorrow Take 324 mg by mouth Daily (before breakfast). 324 mg  
    
   
   
  
   
  
 labetalol 200 mg tablet Commonly known as:  Marlane Angelucci Your next dose is:  tonight Take 300 mg by mouth two (2) times a day. 300 mg MULTIVITAMIN PO Your next dose is:  tomorrow Take  by mouth. simvastatin 20 mg tablet Commonly known as:  ZOCOR Your next dose is:  tonight Take 20 mg by mouth nightly. 20 mg  
    
   
   
  
   
  
 venlafaxine- mg capsule Commonly known as:  EFFEXOR-XR Your next dose is:  tomorrow Take  by mouth daily. STOP taking these medications NIFEdipine ER 90 mg ER tablet Commonly known as:  ADALAT CC Where to Get Your Medications Information on where to get these meds will be given to you by the nurse or doctor. ! Ask your nurse or doctor about these medications  
  cephALEXin 500 mg capsule HYDROcodone-acetaminophen 7.5-325 mg per tablet

## 2018-07-28 NOTE — ED PROVIDER NOTES
EMERGENCY DEPARTMENT HISTORY AND PHYSICAL EXAM 
 
11:49 AM 
 
 
Date: 7/28/2018 Patient Name: Gertrudis Schwab History of Presenting Illness Chief Complaint Patient presents with  Fever History Provided By: Patient Chief Complaint: Fever Duration: 1 Day Timing:  Acute Location: N/A Quality: Aching Severity: Moderate Modifying Factors: Nothing makes it better or worse Associated Symptoms: Chills and body aches Additional History (Context): Gertrudis Schwab is a 46 y.o. female with HTN, DM, anemia, and thyroid disorder who presents with an acute fever that started up last night and said that she was \"not feeling well\". The quality is aching and the severity is moderate. Nothing makes it better or worse and associated symptoms are chills, sore throat, body aches. Patient denies diarrhea, nausea, vomiting, SOB, CP, neck pain, dysuria and cough. PCP: Davey Williamson NP Current Facility-Administered Medications Medication Dose Route Frequency Provider Last Rate Last Dose  sodium chloride (NS) flush 5-10 mL  5-10 mL IntraVENous PRN Pedro Miner PA-C      
 cefTRIAXone (ROCEPHIN) 2 g in 0.9% sodium chloride (MBP/ADV) 50 mL MBP  2 g IntraVENous Q24H Pedro Miner PA-C 100 mL/hr at 07/28/18 1117 2 g at 07/28/18 1117  
 sodium chloride 0.9 % bolus infusion 710 mL  710 mL IntraVENous ONCE Pedro Miner PA-C Current Outpatient Prescriptions Medication Sig Dispense Refill  acetaminophen (TYLENOL) 325 mg tablet Take 2 Tabs by mouth every four (4) hours as needed for Pain. 20 Tab 0  cyclobenzaprine (FLEXERIL) 5 mg tablet Take 1 Tab by mouth three (3) times daily as needed for Muscle Spasm(s). 12 Tab 0  
 budesonide-formoterol (SYMBICORT) 160-4.5 mcg/actuation HFAA Take 2 Puffs by inhalation two (2) times a day. 1 Inhaler 5  MULTIVITAMIN PO Take  by mouth.  acyclovir (ZOVIRAX) 400 mg tablet Take 400 mg by mouth daily.     
 ferrous sulfate 324 mg (65 mg iron) tablet Take 324 mg by mouth Daily (before breakfast).  venlafaxine-SR (EFFEXOR-XR) 150 mg capsule Take  by mouth daily.  labetalol (NORMODYNE) 200 mg tablet Take 300 mg by mouth two (2) times a day.  simvastatin (ZOCOR) 20 mg tablet Take 20 mg by mouth nightly.  NIFEdipine CR (ADALAT CC) 90 mg CR tablet Take 90 mg by mouth daily. Past History Past Medical History: 
Past Medical History:  
Diagnosis Date  Anemia  Bone lesion  Chronic kidney disease  Depressed  Diabetes ([facility]  Diabetes mellitus ([facility]  Hypertension  Kidney problem  Pneumonia  Sarcoidosis  Thyroid disorder  Uterus problem Past Surgical History: 
Past Surgical History:  
Procedure Laterality Date  HX  SECTION    
 HX OTHER SURGICAL    
 neck biopsy  HX TUBAL LIGATION Family History: 
Family History Problem Relation Age of Onset  Hypertension Mother  Diabetes Mother  Cancer Mother  Cancer Sister Social History: 
Social History Substance Use Topics  Smoking status: Former Smoker Types: Cigarettes  Smokeless tobacco: Never Used  Alcohol use Yes Comment: beer rarely Allergies: 
No Known Allergies Review of Systems Review of Systems Constitutional: Positive for chills and fever. HENT: Positive for sore throat. Negative for ear pain. Eyes: Negative for pain and visual disturbance. Respiratory: Negative for cough and shortness of breath. Cardiovascular: Negative for chest pain and palpitations. Gastrointestinal: Negative for abdominal distention, abdominal pain, diarrhea, nausea and vomiting. Genitourinary: Negative for dysuria and flank pain. Musculoskeletal: Negative for back pain and neck pain. Body Aches Neurological: Negative for syncope and headaches. Psychiatric/Behavioral: Negative for agitation. The patient is not nervous/anxious. Physical Exam  
 
Visit Vitals  BP (!) 167/94  Pulse 100  Temp (!) 103.2 °F (39.6 °C)  Resp (!) 33  
 Ht 5' 5\" (1.651 m)  Wt 158.8 kg (350 lb)  SpO2 93%  BMI 58.24 kg/m2 Physical Exam  
Constitutional: She is oriented to person, place, and time. She appears well-developed and well-nourished. HENT:  
Head: Normocephalic and atraumatic. Right Ear: Tympanic membrane normal.  
Left Ear: Tympanic membrane normal.  
Mouth/Throat: Oropharynx is clear and moist. No oropharyngeal exudate. Eyes: Pupils are equal, round, and reactive to light. No scleral icterus. Neck: Neck supple. No tracheal deviation present. Cardiovascular: Regular rhythm. No murmur heard. Pulmonary/Chest: Effort normal and breath sounds normal. No respiratory distress. Abdominal: Soft. There is no tenderness. Musculoskeletal: Normal range of motion. She exhibits no deformity. No crepitus Neurological: She is alert and oriented to person, place, and time. No gross neuro deficit Skin: Skin is warm and dry. No rash noted. She is not diaphoretic. Scabbed hyperpigmented lesion to RLE with mild erythema to the mid calf No erythema to the LLE Warmth of the bilateral LE, right greater than left Psychiatric: She has a normal mood and affect. Nursing note and vitals reviewed. Diagnostic Study Results Labs - Labs Reviewed METABOLIC PANEL, COMPREHENSIVE - Abnormal; Notable for the following:   
    Result Value Sodium 135 (*) Creatinine 2.07 (*)   
 BUN/Creatinine ratio 8 (*)   
 GFR est AA 31 (*)   
 GFR est non-AA 25 (*) Calcium 8.1 (*) ALT (SGPT) 11 (*) Albumin 2.8 (*) Globulin 5.1 (*) A-G Ratio 0.5 (*) All other components within normal limits CBC WITH AUTOMATED DIFF - Abnormal; Notable for the following: WBC 21.6 (*)   
 RBC 4.16 (*) HGB 9.1 (*) HCT 28.9 (*) MCV 69.5 (*) MCH 21.9 (*)   
 RDW 19.8 (*)  NEUTROPHILS 93 (*) LYMPHOCYTES 2 (*)   
 ABS. NEUTROPHILS 19.9 (*)   
 ABS. LYMPHOCYTES 0.5 (*) All other components within normal limits CULTURE, BLOOD CULTURE, BLOOD URINALYSIS W/ RFLX MICROSCOPIC  
POC LACTIC ACID Radiologic Studies -  
XR CHEST PORT    (Results Pending) 1. Mildly underexpanded lungs, without focal pneumonic consolidation or pleural 
effusion. 2. Unchanged radiographic appearance to calcified mediastinal and hilar lymph 
nodes. Medical Decision Making I am the first provider for this patient. I reviewed the vital signs, available nursing notes, past medical history, past surgical history, family history and social history. Vital Signs-Reviewed the patient's vital signs. Pulse Oximetry Analysis -  93% on room air wnl Cardiac Monitor: 
Rate: 96 EKG: 
Normal sinus rhythm, rate 93 Left anterior fascicular block Poor R Wave Progression No acute ST elevation Records Reviewed: Nursing Notes (Time of Review: 11:49 AM) MDM, Progress Notes, Reevaluation, and Consults: DDX: Sepsis, viral infection, cellulitis, UTI, pneumonia, electrolye abnormality, metabolic disturbance, JOON 
 
ED Course Comment By Time Presents with SIRS and unknown source. May be viral due to sore throat yesterday with myalgias, fever/chills but RLE is also erythematous surrounding chronic scabbing hyperpigmented lesion concerning for cellulitis, pt reports edema is chronic and actually improved from baseline. Brianna Barton, DO 07/28 1158 Urine pending. Started on broad spectrum antibiotics. PE consistent with cellulitis of lower extremities R > L. The patient was given: 
Medications  
sodium chloride (NS) flush 5-10 mL (not administered)  
cefTRIAXone (ROCEPHIN) 2 g in 0.9% sodium chloride (MBP/ADV) 50 mL MBP (2 g IntraVENous New Bag 7/28/18 1117)  
sodium chloride 0.9 % bolus infusion 1,000 mL (1,000 mL IntraVENous New Bag 7/28/18 1113)   Followed by  
sodium chloride 0.9 % bolus infusion 710 mL (not administered)  
acetaminophen (TYLENOL) tablet 1,000 mg (1,000 mg Oral Given 7/28/18 1112) Pt admitted to internal medicine service, responding well to fluid resuscitation. Diagnosis Clinical Impression: 1. Sepsis, due to unspecified organism ([facility] [address] 2. JOON (acute kidney injury) ([facility] [address] 3. Anemia, unspecified type 4. Cellulitis of right lower extremity Disposition: Admit Follow-up Information None Patient's Medications Start Taking No medications on file Continue Taking ACETAMINOPHEN (TYLENOL) 325 MG TABLET    Take 2 Tabs by mouth every four (4) hours as needed for Pain. ACYCLOVIR (ZOVIRAX) 400 MG TABLET    Take 400 mg by mouth daily. BUDESONIDE-FORMOTEROL (SYMBICORT) 160-4.5 MCG/ACTUATION HFAA    Take 2 Puffs by inhalation two (2) times a day. CYCLOBENZAPRINE (FLEXERIL) 5 MG TABLET    Take 1 Tab by mouth three (3) times daily as needed for Muscle Spasm(s). FERROUS SULFATE 324 MG (65 MG IRON) TABLET    Take 324 mg by mouth Daily (before breakfast). LABETALOL (NORMODYNE) 200 MG TABLET    Take 300 mg by mouth two (2) times a day. MULTIVITAMIN PO    Take  by mouth. NIFEDIPINE CR (ADALAT CC) 90 MG CR TABLET    Take 90 mg by mouth daily. SIMVASTATIN (ZOCOR) 20 MG TABLET    Take 20 mg by mouth nightly. VENLAFAXINE-SR (EFFEXOR-XR) 150 MG CAPSULE    Take  by mouth daily. These Medications have changed No medications on file Stop Taking LEVOFLOXACIN (LEVAQUIN) 750 MG TABLET    Take 1 Tab by mouth daily. _______________________________ Scribe Attestation Awais Garcia acting as a scribe for and in the presence of Brianna Barton, DO     
July 28, 2018 at 11:49 AM 
    
Provider Attestation:     
I personally performed the services described in the documentation, reviewed the documentation, as recorded by the scribe in my presence, and it accurately and completely records my words and actions.  July 28, 2018 at 11:49 AM - Jamaal Marquez DO

## 2018-07-28 NOTE — H&P
Hospitalist Admission Note NAME:  Terrie Mederos :   1970 MRN:   [id number] Date/Time:  2018 3:16 PM 
 
Subjective: CHIEF COMPLAINT:   
Chief Complaint Patient presents with  Fever HISTORY OF PRESENT ILLNESS:    
Mirella Young is a 46 y.o.  female with /ho ckd3,dysfunctional uterine bleeding,diabetes,hypertension,morbid obesity,anemia,came to the emergency room c/o generalized malaise,myalgia,chills,fever since last night. No nausea,vomiting,cough,dysuria. In the ER,her Temp 103.2,tachycardic. Lab works showing wbc 21.6,hbg 9.1,creat 2.07 compared to 1.63 on 2018. On physical exam she was noted to have cellulitis of right leg. She admitted for sepsis likely due to cellulitis of right leg. She was started on iv ceftriaxone and vanc in the ER. Past Medical History:  
Diagnosis Date  Anemia  Bone lesion  Chronic kidney disease  Depressed  Diabetes ([address]  Diabetes mellitus ([address]  Hypertension  Kidney problem  Pneumonia  Sarcoidosis  Thyroid disorder  Uterus problem Social History Substance Use Topics  Smoking status: Former Smoker Types: Cigarettes  Smokeless tobacco: Former User  Alcohol use Yes Comment: beer rarely Family History Problem Relation Age of Onset  Hypertension Mother  Diabetes Mother  Cancer Mother  Cancer Sister No Known Allergies Prior to Admission medications Medication Sig Start Date End Date Taking? Authorizing Provider  
acetaminophen (TYLENOL) 325 mg tablet Take 2 Tabs by mouth every four (4) hours as needed for Pain. 18   Mey Atkinson MD  
cyclobenzaprine (FLEXERIL) 5 mg tablet Take 1 Tab by mouth three (3) times daily as needed for Muscle Spasm(s). 18   Mey Atkinson MD  
budesonide-formoterol (SYMBICORT) 160-4.5 mcg/actuation HFAA Take 2 Puffs by inhalation two (2) times a day.  18   aCtarino Castellano MD  
MULTIVITAMIN PO Take  by mouth.    Historical Provider  
acyclovir (ZOVIRAX) 400 mg tablet Take 400 mg by mouth daily. Robert Elam MD  
ferrous sulfate 324 mg (65 mg iron) tablet Take 324 mg by mouth Daily (before breakfast). Robert Elam MD  
venlafaxine-SR [facility] 150 mg capsule Take  by mouth daily. Robert Elam MD  
labetalol (NORMODYNE) 200 mg tablet Take 300 mg by mouth two (2) times a day. Robert Elam MD  
simvastatin (ZOCOR) 20 mg tablet Take 20 mg by mouth nightly. Robert Elam MD  
NIFEdipine CR (ADALAT CC) 90 mg CR tablet Take 90 mg by mouth daily. Robert Elam MD  
 
 
REVIEW OF SYSTEMS:   
Constitutional:  Fever,chills,generalized malaise. HEENT:  No headache or visual changes Cardiovascular:  No chest pain, no palpitations. Respiratory:  No coughing, wheezing, or shortness of breath. GI:  No abdominal pain. No nausea or vomiting. No diarrhea :  No hematuria or dysuria. No frequency, retention, urinary incontinence. Skin:  No rashes or moles Neuro:  No seizures or syncope Hematological:  No bruising or bleeding Endocrine:  No diabetes or thyroid disease Objective: VITALS:  
 
 
Visit Vitals  /87 (BP 1 Location: Right arm, BP Patient Position: At rest)  Pulse (!) 0  
 Temp (!) 103 °F (39.4 °C)  Resp 28  Ht 5' 5\" (1.651 m)  Wt 158.8 kg (350 lb)  SpO2 95%  BMI 58.24 kg/m2 O2 Device: Room air PHYSICAL EXAM:  
General:    Lying in bed in no acute distress but looking sick. HEENT:  Pupils equal.  Sclera anicteric. Conjunctiva pink. Mucous membranes dry Neck:  Supple. Trachea midline. No accessory muscle use. No thyromegaly. No jugular venous distention CV:                  Regular rate and rhythm. Lungs:   Clear to auscultation bilaterally. No Wheezing or Rhonchi. No rales. Normal to percussion Abdomen:   Soft, non-tender. Not distended.   Bowel sounds normal. No organomegaly Extremities: Red,warn with mild tenderness right leg. No cyanosis. No edema. No clubbing Neurologic: Alert and oriented X 3. LAB DATA REVIEWED:   
Recent Results (from the past 24 hour(s)) METABOLIC PANEL, COMPREHENSIVE Collection Time: 07/28/18 11:15 AM  
Result Value Ref Range Sodium 135 (L) 136 - 145 mmol/L Potassium 3.7 3.5 - 5.5 mmol/L Chloride 100 100 - 108 mmol/L  
 CO2 26 21 - 32 mmol/L Anion gap 9 3.0 - 18 mmol/L Glucose 90 74 - 99 mg/dL BUN 16 7.0 - 18 MG/DL Creatinine 2.07 (H) 0.6 - 1.3 MG/DL  
 BUN/Creatinine ratio 8 (L) 12 - 20 GFR est AA 31 (L) >60 ml/min/1.73m2 GFR est non-AA 25 (L) >60 ml/min/1.73m2 Calcium 8.1 (L) 8.5 - 10.1 MG/DL Bilirubin, total 0.6 0.2 - 1.0 MG/DL  
 ALT (SGPT) 11 (L) 13 - 56 U/L  
 AST (SGOT) 20 15 - 37 U/L Alk. phosphatase 69 45 - 117 U/L Protein, total 7.9 6.4 - 8.2 g/dL Albumin 2.8 (L) 3.4 - 5.0 g/dL Globulin 5.1 (H) 2.0 - 4.0 g/dL A-G Ratio 0.5 (L) 0.8 - 1.7    
CBC WITH AUTOMATED DIFF Collection Time: 07/28/18 11:15 AM  
Result Value Ref Range WBC 21.6 (H) 4.6 - 13.2 K/uL  
 RBC 4.16 (L) 4.20 - 5.30 M/uL HGB 9.1 (L) 12.0 - 16.0 g/dL HCT 28.9 (L) 35.0 - 45.0 % MCV 69.5 (L) 74.0 - 97.0 FL  
 MCH 21.9 (L) 24.0 - 34.0 PG  
 MCHC 31.5 31.0 - 37.0 g/dL  
 RDW 19.8 (H) 11.6 - 14.5 % PLATELET 691 174 - 012 K/uL MPV 9.7 9.2 - 11.8 FL  
 NEUTROPHILS 93 (H) 40 - 73 % LYMPHOCYTES 2 (L) 21 - 52 % MONOCYTES 5 3 - 10 % EOSINOPHILS 0 0 - 5 % BASOPHILS 0 0 - 2 %  
 ABS. NEUTROPHILS 19.9 (H) 1.8 - 8.0 K/UL  
 ABS. LYMPHOCYTES 0.5 (L) 0.9 - 3.6 K/UL  
 ABS. MONOCYTES 1.1 0.05 - 1.2 K/UL  
 ABS. EOSINOPHILS 0.0 0.0 - 0.4 K/UL  
 ABS. BASOPHILS 0.0 0.0 - 0.1 K/UL  
 DF AUTOMATED    
POC LACTIC ACID Collection Time: 07/28/18 11:15 AM  
Result Value Ref Range Lactic Acid (POC) 1.7 0.4 - 2.0 mmol/L  
EKG, 12 LEAD, INITIAL  Collection Time: 07/28/18 11:50 AM  
Result Value Ref Range Ventricular Rate 0 BPM  
 Atrial Rate 0 BPM  
 QRS Duration 0 ms Q-T Interval 0 ms QTC Calculation (Bezet) 0 ms Calculated R Axis 0 degrees Calculated T Axis 0 degrees Diagnosis No QRS complexes found, no ECG analysis possible When compared with ECG of 26-AUG-2017 15:22, 
Current undetermined rhythm precludes rhythm comparison, needs review URINALYSIS W/ RFLX MICROSCOPIC Collection Time: 07/28/18 12:57 PM  
Result Value Ref Range Color YELLOW Appearance CLEAR Specific gravity 1.015 1.005 - 1.030    
 pH (UA) 6.0 5.0 - 8.0 Protein 300 (A) NEG mg/dL Glucose NEGATIVE  NEG mg/dL Ketone NEGATIVE  NEG mg/dL Bilirubin NEGATIVE  NEG Blood LARGE (A) NEG Urobilinogen 0.2 0.2 - 1.0 EU/dL Nitrites NEGATIVE  NEG Leukocyte Esterase NEGATIVE  NEG    
URINE MICROSCOPIC ONLY Collection Time: 07/28/18 12:57 PM  
Result Value Ref Range WBC 0 to 3 0 - 4 /hpf  
 RBC 50 to 100 0 - 5 /hpf Epithelial cells 3+ 0 - 5 /lpf Bacteria 2+ (A) NEG /hpf Assessment/Plan:  
  
Principal Problem: 
  Sepsis ([facility] (7/28/2018) Active Problems: 
  Type 2 diabetes mellitus Sarcoidosis CKD (chronic kidney disease) stage 3, GFR 30-59 ml/min (9/2/2013) Overview: Baseline Cr ~1.3, eGFR ~47, Nephrologist Dr. Luretha Nyhan. Morbid obesity ([facility] Cellulitis of right leg (7/28/2018) JOON (acute kidney injury) ([facility] (7/28/2018) Chronic anemia (7/28/2018) 
 
  
___________________________________________________ PLAN: 1. Sepsis due to cellulitis of rt leg 
  -Received vanc and ceftriaxone in ER. -IV fluid 
  -Will continue vanc and add zosyn 
  -Will follow blood cx. 
  -Lact acid 
  -Monitor extend of cellulitis 2. JOON on CKD 2 
  -On iv fluid 
  -Monitor creatinine 3. Hematuria and bacteruria  
  -H/o dysfunctional uterine bleeding,fibroids 
  -Order Ucx  
  -Monitor if significant blood loss,monitor Hgb 
  -Has required transfusions in the past due to the bleeding. 4.Chronic anemia 
  -On Feso4 
  -Monitor Hgb 5. Type 2 diabetes mellitus  
  -On ssi 6. Sarcoidosis -CXR showing hilar adenopathy  
 -Duo-neb 7. Morbid obesity  
 -Supportive care 
 -Weight loss education. 8.HTN 
 -Lopressor resumed,holding other bp meds 
 -Monitor closely bp meds and adjust if necessary DVT prophylaxis:sc heparin Full code Disposition:tbd 
 
___________________________________________________ Admitting Physician: Francesca Isaacs MD

## 2018-07-28 NOTE — PROGRESS NOTES
Problem: Falls - Risk of 
Goal: *Absence of Falls Document Beau Mcfadden Fall Risk and appropriate interventions in the flowsheet. Outcome: Progressing Towards Goal 
Fall Risk Interventions: 
Mobility Interventions: Patient to call before getting OOB Elimination Interventions: Call light in reach, Toileting schedule/hourly rounds Problem: Pressure Injury - Risk of 
Goal: *Prevention of pressure injury Document Aureliano Scale and appropriate interventions in the flowsheet. Outcome: Progressing Towards Goal 
Pressure Injury Interventions: 
  
 
Moisture Interventions: Check for incontinence Q2 hours and as needed, Maintain skin hydration (lotion/cream), Minimize layers, Moisture barrier, Absorbent underpads Activity Interventions: Increase time out of bed Mobility Interventions: Pressure redistribution bed/mattress (bed type), PT/OT evaluation Nutrition Interventions: Document food/fluid/supplement intake

## 2018-07-28 NOTE — PROGRESS NOTES
Dr Nicole Dooley made aware, tylenol given, continue to monitor, no other interventions att this time

## 2018-07-28 NOTE — ED NOTES
Pt resting in bed with no apparent distress. Updated on status and understands that she is being admitted for further evaluation.

## 2018-07-28 NOTE — IP AVS SNAPSHOT
[address] [address] 
[phone] Patient: Baldev Ghotra MRN: [id number] AZM:[id number] About your hospitalization You were admitted on:  July 28, 2018 You last received care in the:  [address] [place] [address] Road You were discharged on:  August 1, 2018 Why you were hospitalized Your primary diagnosis was:  Sepsis (Hcc) Your diagnoses also included:  Cellulitis Of Right Leg, Roosevelt (Acute Kidney Injury) (Hcc), Ckd (Chronic Kidney Disease) Stage 3, Gfr 30-59 Ml/Min, Type 2 Diabetes Mellitus (Hcc), Morbid Obesity (Hcc), Sarcoidosis, Chronic Anemia, Bacteremia Follow-up Information Follow up With Details Comments Contact Info Armando Ceja NP Schedule an appointment as soon as possible for a visit in 1 week  [address] [facility] [address] [address] [phone] Your Scheduled Appointments Wednesday August 29, 2018  8:30 PM EDT  
HR PSG with [facility] SLEEP RM [address] SLEEP LAB DEPARTMENT [facility] - Located within [facility] DIVISION) [address]  
[phone] [address] [place] [facility] Centers:      [facility] [phone]: [facility], 4th floor, [place]      [facility] [phone]; [address] [facility], [address]  Patient instructions ·  Please do not arrive prior to your scheduled appointment time as your room may not be ready. ·  Avoid afternoon naps, caffeine and alcoholic beverages the day of your study. ·  Please bring pajamas men bottoms, women tops and bottoms. We   ask that you do not bring a one piece nightgown to sleep in. ·  Please do not apply lotion after shower the day of your appointment  ·  Please do not apply leave in hair products, such as, oils, conditioners or hairspray.  ·  Remove any hairpieces, such as, extensions, weaves & sewn in wigs prior to your appointment. If you arrive with sewn in hairpieces, we will   reschedule your procedure. The Sleep Disorders Centers are outpatient testing department. ·  We encourage you to bring a non-alcoholic/ non-caffeinated beverage and snack, if desired. The cafeteria is closed at night. ·  Please bring any medications that are routinely taken prior to bed. If you have been given a sedative for the study,  DO NOT TAKE THE SEDATIVE BEFORE ARRIVAL. Be advised that if the sedative is taken, we recommend that you not drive for 10 hours after taking it. ·  Diabetic patients should bring testing device, snack and any medications that may be needed. ·  Patients who require breathing treatments should bring the unit with them. ·  The person having the sleep study is the only person allowed in the testing room. If another individual needs to be present throughout the night to assist in the patients care, arrangements must be made prior to the scheduled study date. ·  During the study, we encourage a time free environment. Please refrain from checking the time. ·  The technologist will ask you to turn off your cell phone. ·  Televisions are available in each room but cannot remain on during the study; it interferes with monitoring equipment. ·  During the study, the technologist will ask you to sleep on your back for a portion of the night. ·  Showers are available following your sleep study, please bring any toiletry items. We will provide washcloths and towels. Thank you for choosing the [address]. If you have any questions prior to your appointment, please do not hesitate to contact us at [phone]. Discharge Orders None A check kiersten indicates which time of day the medication should be taken. My Medications START taking these medications Instructions Each Dose to Equal  
 Morning Noon Evening Bedtime  
 cephALEXin 500 mg capsule Commonly known as:  Beryl Kaden Take 1 Cap by mouth two (2) times a day for 10 days. 500 mg HYDROcodone-acetaminophen 7.5-325 mg per tablet Commonly known as:  Aris Ill Take 1 Tab by mouth every six (6) hours as needed. Max Daily Amount: 4 Tabs. 1 Tab CONTINUE taking these medications Instructions Each Dose to Equal  
 Morning Noon Evening Bedtime  
 acetaminophen 325 mg tablet Commonly known as:  TYLENOL Take 2 Tabs by mouth every four (4) hours as needed for Pain. 650 mg  
    
   
   
   
  
 acyclovir 400 mg tablet Commonly known as:  ZOVIRAX Your next dose is:  tomorrow Take 400 mg by mouth daily. 400 mg  
    
  
   
   
   
  
 budesonide-formoterol 160-4.5 mcg/actuation Hfaa Commonly known as:  SYMBICORT Your next dose is:  tonight Take 2 Puffs by inhalation two (2) times a day. 2 Puff  
    
   
   
  
   
  
 cyclobenzaprine 5 mg tablet Commonly known as:  FLEXERIL Take 1 Tab by mouth three (3) times daily as needed for Muscle Spasm(s). 5 mg  
    
   
   
   
  
 ferrous sulfate 324 mg (65 mg iron) tablet Your next dose is:  tomorrow Take 324 mg by mouth Daily (before breakfast). 324 mg  
    
   
   
  
   
  
 labetalol 200 mg tablet Commonly known as:  Shaina Manrique Your next dose is:  tonight Take 300 mg by mouth two (2) times a day. 300 mg MULTIVITAMIN PO Your next dose is:  tomorrow Take  by mouth. simvastatin 20 mg tablet Commonly known as:  ZOCOR Your next dose is:  tonight Take 20 mg by mouth nightly. 20 mg  
    
   
   
  
   
  
 venlafaxine- mg capsule Commonly known as:  EFFEXOR-XR Your next dose is:  tomorrow Take  by mouth daily. STOP taking these medications NIFEdipine ER 90 mg ER tablet Commonly known as:  ADALAT CC  
   
  
 Where to Get Your Medications Information on where to get these meds will be given to you by the nurse or doctor. ! Ask your nurse or doctor about these medications  
  cephALEXin 500 mg capsule HYDROcodone-acetaminophen 7.5-325 mg per tablet Opioid Education Prescription Opioids: What You Need to Know: 
 
 
 
F-face looks uneven A-arms unable to move or move unevenly S-speech slurred or non-existent T-time-call 911 as soon as signs and symptoms begin-DO NOT go Back to bed or wait to see if you get better-TIME IS BRAIN. Warning Signs of HEART ATTACK Call 911 if you have these symptoms: 
? Chest discomfort. Most heart attacks involve discomfort in the center of the chest that lasts more than a few minutes, or that goes away and comes back. It can feel like uncomfortable pressure, squeezing, fullness, or pain. ? Discomfort in other areas of the upper body. Symptoms can include pain or discomfort in one or both arms, the back, neck, jaw, or stomach. ? Shortness of breath with or without chest discomfort. ? Other signs may include breaking out in a cold sweat, nausea, or lightheadedness. Don't wait more than five minutes to call [address]! Fast action can save your life.  Calling 911 is almost always the fastest way to get lifesaving treatment. Emergency Medical Services staff can begin treatment when they arrive  up to an hour sooner than if someone gets to the hospital by car. The discharge information has been reviewed with the patient. The patient verbalized understanding. Discharge medications reviewed with the patient and appropriate educational materials and side effects teaching were provided. ___________________________________________________________________________________________________________________________________ Spoondate Announcement We are excited to announce that we are making your provider's discharge notes available to you in Spoondate. You will see these notes when they are completed and signed by the physician that discharged you from your recent hospital stay. If you have any questions or concerns about any information you see in Spoondate, please call the Health Information Department where you were seen or reach out to your Primary Care Provider for more information about your plan of care. Introducing [facility] HE[facility]! [facility] introduces Spoondate patient portal. Now you can access parts of your medical record, email your doctor's office, and request medication refills online. 1. In your internet browser, go to [email or web address] 2. Click on the First Time User? Click Here link in the Sign In box. You will see the New Member Sign Up page. 3. Enter your Spoondate Access Code exactly as it appears below. You will not need to use this code after youve completed the sign-up process. If you do not sign up before the expiration date, you must request a new code. · Spoondate Access Code: [email or web address] Expires: 8/12/2018  5:16 PM 
 
4. Enter the last four digits of your Social Security Number (xxxx) and Date of Birth (mm/dd/yyyy) as indicated and click Submit. You will be taken to the next sign-up page. 5. Create a MarketMeSuite ID. This will be your MarketMeSuite login ID and cannot be changed, so think of one that is secure and easy to remember. 6. Create a MarketMeSuite password. You can change your password at any time. 7. Enter your Password Reset Question and Answer. This can be used at a later time if you forget your password. 8. Enter your e-mail address. You will receive e-mail notification when new information is available in [address] 9. Click Sign Up. You can now view and download portions of your medical record. 10. Click the Download Summary menu link to download a portable copy of your medical information. If you have questions, please visit the Frequently Asked Questions section of the MarketMeSuite website. Remember, MarketMeSuite is NOT to be used for urgent needs. For medical emergencies, dial 911. Now available from your iPhone and Android! Introducing Addison Jack As a [facility] patient, I wanted to make you aware of our electronic visit tool called Addison Jack. [facility] 24/7 allows you to connect within minutes with a medical provider 24 hours a day, seven days a week via a mobile device or tablet or logging into a secure website from your computer. You can access Addison Montykishorefin from anywhere in the [place]. A virtual visit might be right for you when you have a simple condition and feel like you just dont want to get out of bed, or cant get away from work for an appointment, when your regular [facility] provider is not available (evenings, weekends or holidays), or when youre out of town and need minor care. Electronic visits cost only $49 and if the [facility] 24/7 provider determines a prescription is needed to treat your condition, one can be electronically transmitted to a nearby pharmacy*. Please take a moment to enroll today if you have not already done so. The enrollment process is free and takes just a few minutes.   To enroll, please download the [employer] 24/7 otis to your tablet or phone, or visit [email or web address] to enroll on your computer. And, as an [address] [place] [address] patient with a Retroficiency account, the results of your visits will be scanned into your electronic medical record and your primary care provider will be able to view the scanned results. We urge you to continue to see your regular [employer] provider for your ongoing medical care. And while your primary care provider may not be the one available when you seek a MR Presta virtual visit, the peace of mind you get from getting a real diagnosis real time can be priceless. For more information on MR Presta, view our Frequently Asked Questions (FAQs) at [email or web address]. Sincerely, 
 
Charanjit Bautista MD 
Chief Medical Officer Samantha Sotelo *:  certain medications cannot be prescribed via MR Presta Unresulted Labs-Please follow up with your PCP about these lab tests Order Current Status CULTURE, BLOOD Preliminary result CULTURE, BLOOD Preliminary result Providers Seen During Your Hospitalization Provider Specialty Primary office phone Viktoriya Ramirez DO Emergency Medicine [phone] Braden Acosta MD Internal Medicine [phone] Silvio Mondragon MD Family Practice [phone] Your Primary Care Physician (PCP) Primary Care Physician Office Phone Office Fax Danny Frias [phone] You are allergic to the following No active allergies Recent Documentation Height Weight BMI OB Status Smoking Status 1.651 m 153.3 kg 56.25 kg/m2 Premenopausal Former Smoker Emergency Contacts Name Discharge Info Relation Home Work [facility] DISCHARGE CAREGIVER [3] Child [2] [phone] Patient Belongings The following personal items are in your possession at time of discharge: Clothing: Dress, Other (comment) (flip flops)    Other Valuables: Cell Phone (Cell phone) Discharge Instructions Attachments/References CELLULITIS (ENGLISH) Patient Handouts Cellulitis: Care Instructions Your Care Instructions Cellulitis is a skin infection caused by bacteria, most often strep or staph. It often occurs after a break in the skin from a scrape, cut, bite, or puncture, or after a rash. Cellulitis may be treated without doing tests to find out what caused it. But your doctor may do tests, if needed, to look for a specific bacteria, like methicillin-resistant Staphylococcus aureus (MRSA). The doctor has checked you carefully, but problems can develop later. If you notice any problems or new symptoms, get medical treatment right away. Follow-up care is a key part of your treatment and safety. Be sure to make and go to all appointments, and call your doctor if you are having problems. It's also a good idea to know your test results and keep a list of the medicines you take. How can you care for yourself at home? · Take your antibiotics as directed. Do not stop taking them just because you feel better. You need to take the full course of antibiotics. · Prop up the infected area on pillows to reduce pain and swelling. Try to keep the area above the level of your heart as often as you can. · If your doctor told you how to care for your wound, follow your doctor's instructions. If you did not get instructions, follow this general advice: ¨ Wash the wound with clean water 2 times a day. Don't use hydrogen peroxide or alcohol, which can slow healing. ¨ You may cover the wound with a thin layer of petroleum jelly, such as Vaseline, and a nonstick bandage. ¨ Apply more petroleum jelly and replace the bandage as needed. · Be safe with medicines. Take pain medicines exactly as directed. ¨ If the doctor gave you a prescription medicine for pain, take it as prescribed. ¨ If you are not taking a prescription pain medicine, ask your doctor if you can take an over-the-counter medicine. To prevent cellulitis in the future · Try to prevent cuts, scrapes, or other injuries to your skin. Cellulitis most often occurs where there is a break in the skin. · If you get a scrape, cut, mild burn, or bite, wash the wound with clean water as soon as you can to help avoid infection. Don't use hydrogen peroxide or alcohol, which can slow healing. · If you have swelling in your legs (edema), support stockings and good skin care may help prevent leg sores and cellulitis. · Take care of your feet, especially if you have diabetes or other conditions that increase the risk of infection. Wear shoes and socks. Do not go barefoot. If you have athlete's foot or other skin problems on your feet, talk to your doctor about how to treat them. When should you call for help? Call your doctor now or seek immediate medical care if: 
  · You have signs that your infection is getting worse, such as: 
¨ Increased pain, swelling, warmth, or redness. ¨ Red streaks leading from the area. ¨ Pus draining from the area. ¨ A fever.  
  · You get a rash.  
 Watch closely for changes in your health, and be sure to contact your doctor if: 
  · You do not get better as expected. Where can you learn more? Go to http://marty-chente.info/. Laurita Monique in the search box to learn more about \"Cellulitis: Care Instructions. \" Current as of: May 10, 2017 Content Version: 11.7 © 0780-4664 [employer]. Care instructions adapted under license by [employer] (which disclaims liability or warranty for this information).  If you have questions about a medical condition or this instruction, always ask your healthcare professional. [employer] disclaims any warranty or liability for your use of this information. Please provide this summary of care documentation to your next provider. Signatures-by signing, you are acknowledging that this After Visit Summary has been reviewed with you and you have received a copy. Patient Signature:  ____________________________________________________________ Date:  ____________________________________________________________  
  
[place] Provider Signature:  ____________________________________________________________ Date:  ____________________________________________________________

## 2018-07-28 NOTE — ED NOTES
Fatigue and SOB. Febrile and tachy. Sepsis protocol started in triage. Fluids and abx ordered. Fluids based on ideal body weight due to morbid obesity. I performed a brief evaluation, including history and physical, of the patient here in triage and I have determined that pt will need further treatment and evaluation from the main side ER physician. I have placed initial orders to help in expediting patients care. July 28, 2018 at 11:03 AM - Napoleon Miner PA-C Visit Vitals  /76  Pulse (!) 101  Temp (!) 103.2 °F (39.6 °C)  Resp 18  Ht 5' 5\" (1.651 m)  Wt 158.8 kg (350 lb)  SpO2 100%  BMI 58.24 kg/m2

## 2018-07-28 NOTE — ROUTINE PROCESS
1500 pt arrived via ED in stretcher, awake and alert x4, vitals and assessment completed, MEWS score 6- notified Dr Naomi Wolfe- will give tylenol and reassess, pt received antibiotics in ED, will continue to monitor on tele. Placed on tele box #4, currently sinus rhythm with BBB, verified with Bin in tele. Pt c\o of generalized aches. Pt oriented to room, call bell and hospitalization. 18 g IV in left AC, flushed and working well, currently has bolus running from ED. 
 
 
1744 Dr Naomi Wolfe made aware of mews score 4, temp 103.1, gave order for 600 mg motrin one time dose 1809 labetalol held per Dr Naomi Wolfe, BP dropped from 216U to 241L systolically- motrin given

## 2018-07-29 LAB
ALBUMIN SERPL-MCNC: 2.5 G/DL (ref 3.4–5)
ALBUMIN/GLOB SERPL: 0.6 {RATIO} (ref 0.8–1.7)
ALP SERPL-CCNC: 64 U/L (ref 45–117)
ALT SERPL-CCNC: 10 U/L (ref 13–56)
ANION GAP SERPL CALC-SCNC: 10 MMOL/L (ref 3–18)
AST SERPL-CCNC: 24 U/L (ref 15–37)
ATRIAL RATE: 93 BPM
BASOPHILS # BLD: 0 K/UL (ref 0–0.1)
BASOPHILS NFR BLD: 0 % (ref 0–2)
BILIRUB SERPL-MCNC: 0.6 MG/DL (ref 0.2–1)
BUN SERPL-MCNC: 21 MG/DL (ref 7–18)
BUN/CREAT SERPL: 11 (ref 12–20)
CALCIUM SERPL-MCNC: 8 MG/DL (ref 8.5–10.1)
CALCULATED P AXIS, ECG09: 53 DEGREES
CALCULATED R AXIS, ECG10: -51 DEGREES
CALCULATED T AXIS, ECG11: 67 DEGREES
CHLORIDE SERPL-SCNC: 105 MMOL/L (ref 100–108)
CO2 SERPL-SCNC: 25 MMOL/L (ref 21–32)
CREAT SERPL-MCNC: 1.84 MG/DL (ref 0.6–1.3)
DATE LAST DOSE: NORMAL
DIAGNOSIS, 93000: NORMAL
DIFFERENTIAL METHOD BLD: ABNORMAL
EOSINOPHIL # BLD: 0 K/UL (ref 0–0.4)
EOSINOPHIL NFR BLD: 0 % (ref 0–5)
ERYTHROCYTE [DISTWIDTH] IN BLOOD BY AUTOMATED COUNT: 20.2 % (ref 11.6–14.5)
GLOBULIN SER CALC-MCNC: 4.2 G/DL (ref 2–4)
GLUCOSE BLD STRIP.AUTO-MCNC: 116 MG/DL (ref 70–110)
GLUCOSE BLD STRIP.AUTO-MCNC: 116 MG/DL (ref 70–110)
GLUCOSE BLD STRIP.AUTO-MCNC: 143 MG/DL (ref 70–110)
GLUCOSE BLD STRIP.AUTO-MCNC: 173 MG/DL (ref 70–110)
GLUCOSE SERPL-MCNC: 115 MG/DL (ref 74–99)
HCT VFR BLD AUTO: 27.9 % (ref 35–45)
HGB BLD-MCNC: 8.8 G/DL (ref 12–16)
LYMPHOCYTES # BLD: 0.4 K/UL (ref 0.9–3.6)
LYMPHOCYTES NFR BLD: 3 % (ref 21–52)
MCH RBC QN AUTO: 21.8 PG (ref 24–34)
MCHC RBC AUTO-ENTMCNC: 31.5 G/DL (ref 31–37)
MCV RBC AUTO: 69.2 FL (ref 74–97)
MONOCYTES # BLD: 0.7 K/UL (ref 0.05–1.2)
MONOCYTES NFR BLD: 5 % (ref 3–10)
NEUTS SEG # BLD: 11.8 K/UL (ref 1.8–8)
NEUTS SEG NFR BLD: 92 % (ref 40–73)
P-R INTERVAL, ECG05: 176 MS
PLATELET # BLD AUTO: 146 K/UL (ref 135–420)
POTASSIUM SERPL-SCNC: 3.4 MMOL/L (ref 3.5–5.5)
PROT SERPL-MCNC: 6.7 G/DL (ref 6.4–8.2)
Q-T INTERVAL, ECG07: 356 MS
QRS DURATION, ECG06: 114 MS
QTC CALCULATION (BEZET), ECG08: 442 MS
RBC # BLD AUTO: 4.03 M/UL (ref 4.2–5.3)
REPORTED DOSE,DOSE: NORMAL UNITS
REPORTED DOSE/TIME,TMG: 100
SODIUM SERPL-SCNC: 140 MMOL/L (ref 136–145)
VANCOMYCIN TROUGH SERPL-MCNC: 16.4 UG/ML (ref 10–20)
VENTRICULAR RATE, ECG03: 93 BPM
WBC # BLD AUTO: 12.9 K/UL (ref 4.6–13.2)

## 2018-07-29 PROCEDURE — 74011000258 HC RX REV CODE- 258: Performed by: INTERNAL MEDICINE

## 2018-07-29 PROCEDURE — 94760 N-INVAS EAR/PLS OXIMETRY 1: CPT

## 2018-07-29 PROCEDURE — 36415 COLL VENOUS BLD VENIPUNCTURE: CPT | Performed by: INTERNAL MEDICINE

## 2018-07-29 PROCEDURE — 74011000250 HC RX REV CODE- 250: Performed by: INTERNAL MEDICINE

## 2018-07-29 PROCEDURE — 74011250637 HC RX REV CODE- 250/637: Performed by: INTERNAL MEDICINE

## 2018-07-29 PROCEDURE — 85025 COMPLETE CBC W/AUTO DIFF WBC: CPT | Performed by: INTERNAL MEDICINE

## 2018-07-29 PROCEDURE — 80202 ASSAY OF VANCOMYCIN: CPT | Performed by: INTERNAL MEDICINE

## 2018-07-29 PROCEDURE — 77030038269 HC DRN EXT URIN PURWCK BARD -A

## 2018-07-29 PROCEDURE — 74011250636 HC RX REV CODE- 250/636: Performed by: EMERGENCY MEDICINE

## 2018-07-29 PROCEDURE — 94640 AIRWAY INHALATION TREATMENT: CPT

## 2018-07-29 PROCEDURE — 74011250636 HC RX REV CODE- 250/636: Performed by: INTERNAL MEDICINE

## 2018-07-29 PROCEDURE — 65660000000 HC RM CCU STEPDOWN

## 2018-07-29 PROCEDURE — 80053 COMPREHEN METABOLIC PANEL: CPT | Performed by: INTERNAL MEDICINE

## 2018-07-29 PROCEDURE — 82962 GLUCOSE BLOOD TEST: CPT

## 2018-07-29 RX ADMIN — HEPARIN SODIUM 5000 UNITS: 5000 INJECTION, SOLUTION INTRAVENOUS; SUBCUTANEOUS at 02:27

## 2018-07-29 RX ADMIN — PIPERACILLIN SODIUM,TAZOBACTAM SODIUM 3.38 G: 3; .375 INJECTION, POWDER, FOR SOLUTION INTRAVENOUS at 08:47

## 2018-07-29 RX ADMIN — SIMVASTATIN 20 MG: 20 TABLET, FILM COATED ORAL at 21:44

## 2018-07-29 RX ADMIN — IPRATROPIUM BROMIDE AND ALBUTEROL SULFATE 3 ML: .5; 3 SOLUTION RESPIRATORY (INHALATION) at 13:37

## 2018-07-29 RX ADMIN — IPRATROPIUM BROMIDE AND ALBUTEROL SULFATE 3 ML: .5; 3 SOLUTION RESPIRATORY (INHALATION) at 19:33

## 2018-07-29 RX ADMIN — Medication 10 ML: at 02:29

## 2018-07-29 RX ADMIN — FERROUS GLUCONATE TAB 324 MG (37.5 MG ELEMENTAL IRON) 1 TABLET: 324 (37.5 FE) TAB at 08:47

## 2018-07-29 RX ADMIN — LABETALOL HYDROCHLORIDE 300 MG: 100 TABLET, FILM COATED ORAL at 18:21

## 2018-07-29 RX ADMIN — Medication 10 ML: at 21:44

## 2018-07-29 RX ADMIN — ACETAMINOPHEN 650 MG: 325 TABLET, FILM COATED ORAL at 03:23

## 2018-07-29 RX ADMIN — IPRATROPIUM BROMIDE AND ALBUTEROL SULFATE 3 ML: .5; 3 SOLUTION RESPIRATORY (INHALATION) at 01:16

## 2018-07-29 RX ADMIN — PIPERACILLIN SODIUM,TAZOBACTAM SODIUM 3.38 G: 3; .375 INJECTION, POWDER, FOR SOLUTION INTRAVENOUS at 13:34

## 2018-07-29 RX ADMIN — CYCLOBENZAPRINE HYDROCHLORIDE 5 MG: 10 TABLET, FILM COATED ORAL at 18:21

## 2018-07-29 RX ADMIN — HYDROCODONE BITARTRATE AND ACETAMINOPHEN 1 TABLET: 7.5; 325 TABLET ORAL at 13:34

## 2018-07-29 RX ADMIN — PIPERACILLIN SODIUM,TAZOBACTAM SODIUM 3.38 G: 3; .375 INJECTION, POWDER, FOR SOLUTION INTRAVENOUS at 21:44

## 2018-07-29 RX ADMIN — SODIUM CHLORIDE 750 MG: 900 INJECTION, SOLUTION INTRAVENOUS at 17:47

## 2018-07-29 RX ADMIN — IPRATROPIUM BROMIDE AND ALBUTEROL SULFATE 3 ML: .5; 3 SOLUTION RESPIRATORY (INHALATION) at 08:00

## 2018-07-29 RX ADMIN — VENLAFAXINE HYDROCHLORIDE 150 MG: 150 CAPSULE, EXTENDED RELEASE ORAL at 08:48

## 2018-07-29 RX ADMIN — HEPARIN SODIUM 5000 UNITS: 5000 INJECTION, SOLUTION INTRAVENOUS; SUBCUTANEOUS at 08:48

## 2018-07-29 RX ADMIN — HEPARIN SODIUM 5000 UNITS: 5000 INJECTION, SOLUTION INTRAVENOUS; SUBCUTANEOUS at 18:25

## 2018-07-29 RX ADMIN — PIPERACILLIN SODIUM,TAZOBACTAM SODIUM 3.38 G: 3; .375 INJECTION, POWDER, FOR SOLUTION INTRAVENOUS at 02:25

## 2018-07-29 RX ADMIN — ACETAMINOPHEN 650 MG: 325 TABLET, FILM COATED ORAL at 18:21

## 2018-07-29 RX ADMIN — Medication 10 ML: at 18:34

## 2018-07-29 RX ADMIN — SODIUM CHLORIDE 750 MG: 900 INJECTION, SOLUTION INTRAVENOUS at 02:25

## 2018-07-29 NOTE — H&P
Hospitalist Admission Note NAME:  Lara Gu :   1970 MRN:   [id number] Date/Time:  2018 3:16 PM 
 
Subjective: CHIEF COMPLAINT:   
Chief Complaint Patient presents with  Fever HISTORY OF PRESENT ILLNESS:    
Jack Fowler is a 46 y.o.  female with /ho ckd3,dysfunctional uterine bleeding,diabetes,hypertension,morbid obesity,anemia,came to the emergency room c/o generalized malaise,myalgia,chills,fever since last night. No nausea,vomiting,cough,dysuria. In the ER,her Temp 103.2,tachycardic. Lab works showing wbc 21.6,hbg 9.1,creat 2.07 compared to 1.63 on 2018. On physical exam she was noted to have cellulitis of right leg. She admitted for sepsis likely due to cellulitis of right leg. She was started on iv ceftriaxone and vanc in the ER. Past Medical History:  
Diagnosis Date  Anemia  Bone lesion  Chronic kidney disease  Depressed  Diabetes ([address]  Diabetes mellitus ([address]  Hypertension  Kidney problem  Pneumonia  Sarcoidosis  Thyroid disorder  Uterus problem Social History Substance Use Topics  Smoking status: Former Smoker Types: Cigarettes  Smokeless tobacco: Former User  Alcohol use Yes Comment: beer rarely Family History Problem Relation Age of Onset  Hypertension Mother  Diabetes Mother  Cancer Mother  Cancer Sister No Known Allergies Prior to Admission medications Medication Sig Start Date End Date Taking? Authorizing Provider  
acetaminophen (TYLENOL) 325 mg tablet Take 2 Tabs by mouth every four (4) hours as needed for Pain. 18   Taran Jose MD  
cyclobenzaprine (FLEXERIL) 5 mg tablet Take 1 Tab by mouth three (3) times daily as needed for Muscle Spasm(s). 18   Taran Jose MD  
budesonide-formoterol (SYMBICORT) 160-4.5 mcg/actuation HFAA Take 2 Puffs by inhalation two (2) times a day.  18   Ulises Alvarado MD  
MULTIVITAMIN PO Take  by mouth.    Historical Provider  
acyclovir (ZOVIRAX) 400 mg tablet Take 400 mg by mouth daily. Robert Elam MD  
ferrous sulfate 324 mg (65 mg iron) tablet Take 324 mg by mouth Daily (before breakfast). Robert Elam MD  
venlafaxine-SR [facility] 150 mg capsule Take  by mouth daily. Robert Elam MD  
labetalol (NORMODYNE) 200 mg tablet Take 300 mg by mouth two (2) times a day. Robert Elam MD  
simvastatin (ZOCOR) 20 mg tablet Take 20 mg by mouth nightly. Robert Elam MD  
NIFEdipine CR (ADALAT CC) 90 mg CR tablet Take 90 mg by mouth daily. Robert Elam MD  
 
 
REVIEW OF SYSTEMS:   
Constitutional:  Fever,chills,generalized malaise. HEENT:  No headache or visual changes Cardiovascular:  No chest pain, no palpitations. Respiratory:  No coughing, wheezing, or shortness of breath. GI:  No abdominal pain. No nausea or vomiting. No diarrhea :  No hematuria or dysuria. No frequency, retention, urinary incontinence. Skin:  No rashes or moles Neuro:  No seizures or syncope Hematological:  No bruising or bleeding Endocrine:  No diabetes or thyroid disease Objective: VITALS:  
 
 
Visit Vitals  /74 (BP 1 Location: Right arm, BP Patient Position: Supine)  Pulse 93  Temp 98.9 °F (37.2 °C)  Resp 20  
 Ht 5' 5\" (1.651 m)  Wt 153.7 kg (338 lb 14.4 oz)  SpO2 94%  BMI 56.4 kg/m2 O2 Device: Room air PHYSICAL EXAM:  
General:    Lying in bed in no acute distress but looking sick. HEENT:  Pupils equal.  Sclera anicteric. Conjunctiva pink. Mucous membranes dry Neck:  Supple. Trachea midline. No accessory muscle use. No thyromegaly. No jugular venous distention CV:                  Regular rate and rhythm. Lungs:   Clear to auscultation bilaterally. No Wheezing or Rhonchi. No rales. Normal to percussion Abdomen:   Soft, non-tender. Not distended.   Bowel sounds normal. No organomegaly Extremities: Red,warn with mild tenderness right leg. No cyanosis. No edema. No clubbing Neurologic: Alert and oriented X 3. LAB DATA REVIEWED:   
Recent Results (from the past 24 hour(s)) GLUCOSE, POC Collection Time: 07/28/18  5:40 PM  
Result Value Ref Range Glucose (POC) 120 (H) 70 - 110 mg/dL GLUCOSE, POC Collection Time: 07/28/18  9:33 PM  
Result Value Ref Range Glucose (POC) 101 70 - 110 mg/dL CBC WITH AUTOMATED DIFF Collection Time: 07/29/18  4:46 AM  
Result Value Ref Range WBC 12.9 4.6 - 13.2 K/uL  
 RBC 4.03 (L) 4.20 - 5.30 M/uL HGB 8.8 (L) 12.0 - 16.0 g/dL HCT 27.9 (L) 35.0 - 45.0 % MCV 69.2 (L) 74.0 - 97.0 FL  
 MCH 21.8 (L) 24.0 - 34.0 PG  
 MCHC 31.5 31.0 - 37.0 g/dL RDW 20.2 (H) 11.6 - 14.5 % PLATELET 285 316 - 762 K/uL NEUTROPHILS 92 (H) 40 - 73 % LYMPHOCYTES 3 (L) 21 - 52 % MONOCYTES 5 3 - 10 % EOSINOPHILS 0 0 - 5 % BASOPHILS 0 0 - 2 %  
 ABS. NEUTROPHILS 11.8 (H) 1.8 - 8.0 K/UL  
 ABS. LYMPHOCYTES 0.4 (L) 0.9 - 3.6 K/UL  
 ABS. MONOCYTES 0.7 0.05 - 1.2 K/UL  
 ABS. EOSINOPHILS 0.0 0.0 - 0.4 K/UL  
 ABS. BASOPHILS 0.0 0.0 - 0.1 K/UL  
 DF AUTOMATED METABOLIC PANEL, COMPREHENSIVE Collection Time: 07/29/18  4:46 AM  
Result Value Ref Range Sodium 140 136 - 145 mmol/L Potassium 3.4 (L) 3.5 - 5.5 mmol/L Chloride 105 100 - 108 mmol/L  
 CO2 25 21 - 32 mmol/L Anion gap 10 3.0 - 18 mmol/L Glucose 115 (H) 74 - 99 mg/dL BUN 21 (H) 7.0 - 18 MG/DL Creatinine 1.84 (H) 0.6 - 1.3 MG/DL  
 BUN/Creatinine ratio 11 (L) 12 - 20 GFR est AA 35 (L) >60 ml/min/1.73m2 GFR est non-AA 29 (L) >60 ml/min/1.73m2 Calcium 8.0 (L) 8.5 - 10.1 MG/DL Bilirubin, total 0.6 0.2 - 1.0 MG/DL  
 ALT (SGPT) 10 (L) 13 - 56 U/L  
 AST (SGOT) 24 15 - 37 U/L Alk. phosphatase 64 45 - 117 U/L Protein, total 6.7 6.4 - 8.2 g/dL Albumin 2.5 (L) 3.4 - 5.0 g/dL Globulin 4.2 (H) 2.0 - 4.0 g/dL  A-G Ratio 0.6 (L) 0.8 - 1.7 GLUCOSE, POC Collection Time: 07/29/18  8:42 AM  
Result Value Ref Range Glucose (POC) 116 (H) 70 - 110 mg/dL GLUCOSE, POC Collection Time: 07/29/18 11:05 AM  
Result Value Ref Range Glucose (POC) 143 (H) 70 - 110 mg/dL Assessment/Plan:  
  
Principal Problem: 
  Sepsis ([facility] (7/28/2018) Active Problems: 
  Type 2 diabetes mellitus Sarcoidosis CKD (chronic kidney disease) stage 3, GFR 30-59 ml/min (9/2/2013) Overview: Baseline Cr ~1.3, eGFR ~47, Nephrologist Dr. Kp Wade. Morbid obesity ([facility] Cellulitis of right leg (7/28/2018) JOON (acute kidney injury) ([facility] (7/28/2018) Chronic anemia (7/28/2018) 
 
  
___________________________________________________ PLAN: 1. Sepsis due to cellulitis of rt leg 
  -Received vanc and ceftriaxone in ER. -IV fluid 
  -Will continue vanc and add zosyn 
  -Will follow blood cx. 
  -Lact acid 
  -Monitor extend of cellulitis 2. JOON on CKD 2 
  -On iv fluid 
  -Monitor creatinine 3. Hematuria and bacteruria  
  -H/o dysfunctional uterine bleeding,fibroids 
  -Order Ucx  
  -Monitor if significant blood loss,monitor Hgb 
  -Has required transfusions in the past due to the bleeding. 4.Chronic anemia 
  -On Feso4 
  -Monitor Hgb 5. Type 2 diabetes mellitus  
  -On ssi 6. Sarcoidosis -CXR showing hilar adenopathy  
 -Duo-neb 7. Morbid obesity  
 -Supportive care 
 -Weight loss education. 8.HTN 
 -Lopressor resumed,holding other bp meds 
 -Monitor closely bp meds and adjust if necessary DVT prophylaxis:sc heparin Full code Disposition:tbd 
 
___________________________________________________ Admitting Physician: Mortimer Diesel, MD

## 2018-07-29 NOTE — PROGRESS NOTES
Problem: Falls - Risk of 
Goal: *Absence of Falls Document Beau Mcfadden Fall Risk and appropriate interventions in the flowsheet. Fall Risk Interventions: 
Mobility Interventions: Bed/chair exit alarm Elimination Interventions: Call light in reach, Bed/chair exit alarm

## 2018-07-29 NOTE — PROGRESS NOTES
Hospitalist Progress Note Richa Reynoso MD 
Internal medicine/ Hospitalist 
 
Daily Progress Note: 7/29/2018 13:10 PM 
   
Interval history / Subjective:  
Franklin Oakes is a 46 y.o.  female with /ho ckd3,dysfunctional uterine bleeding,diabetes,hypertension,morbid obesity,anemia,came to the emergency room c/o generalized malaise,myalgia,chills,fever since the night before ER visit. No nausea,vomiting,cough,dysuria. In the ER,her Temp 103.2,tachycardic. Lab worked showing wbc 21.6,hbg 9.1,creat 2.07 compared to 1.63 on 5/14/2018. On physical exam,noted to have cellulitis of right leg. She was admitted for sepsis likely due to cellulitis of right leg. She was started on iv ceftriaxone and vanc in the ER. Blood cx showing GPC in pairs on gram stain,will follow culture results. Current Facility-Administered Medications Medication Dose Route Frequency  sodium chloride (NS) flush 5-10 mL  5-10 mL IntraVENous PRN  
 vancomycin (VANCOCIN) 750 mg in 0.9% sodium chloride (MBP/ADV) 250 mL ADV  750 mg IntraVENous Q12H  VANCOMYCIN INFORMATION NOTE   Other ONCE  
 VANCOMYCIN INFORMATION NOTE 1 Each  1 Each Other Rx Dosing/Monitoring  acetaminophen (TYLENOL) tablet 650 mg  650 mg Oral Q6H PRN  
 cyclobenzaprine (FLEXERIL) tablet 5 mg  5 mg Oral TID PRN  
 ferrous gluconate 324 mg (37.5 mg iron) tablet 1 Tab  324 mg Oral ACB  labetalol (NORMODYNE) tablet 300 mg  300 mg Oral BID  simvastatin (ZOCOR) tablet 20 mg  20 mg Oral QHS  venlafaxine-SR (EFFEXOR-XR) capsule 150 mg  150 mg Oral DAILY  sodium chloride (NS) flush 5-10 mL  5-10 mL IntraVENous Q8H  
 sodium chloride (NS) flush 5-10 mL  5-10 mL IntraVENous PRN  
 HYDROcodone-acetaminophen (NORCO) 7.5-325 mg per tablet 1 Tab  1 Tab Oral Q6H PRN  
 heparin (porcine) injection 5,000 Units  5,000 Units SubCUTAneous Q8H  piperacillin-tazobactam (ZOSYN) 3.375 g in 0.9% sodium chloride (MBP/ADV) 100 mL MBP EXTENDED 4 HOUR INFUSION###  3.375 g IntraVENous Q6H  0.9% sodium chloride infusion  100 mL/hr IntraVENous CONTINUOUS  
 insulin lispro (HUMALOG) injection   SubCUTAneous AC&HS  hydrALAZINE (APRESOLINE) 20 mg/mL injection 10 mg  10 mg IntraVENous Q6H PRN  
 albuterol-ipratropium (DUO-NEB) 2.5 MG-0.5 MG/3 ML  3 mL Nebulization Q6H RT  
  
 
Review of Systems She is feeling better today but still tired. Objective:  
 
Visit Vitals  /74 (BP 1 Location: Right arm, BP Patient Position: Supine)  Pulse 93  Temp 98.9 °F (37.2 °C)  Resp 20  
 Ht 5' 5\" (1.651 m)  Wt 153.7 kg (338 lb 14.4 oz)  SpO2 94%  BMI 56.4 kg/m2 O2 Device: Room air Temp (24hrs), Av.5 °F (38.1 °C), Min:98.4 °F (36.9 °C), Max:103.1 °F (39.5 °C) 
 
 
  
 1901 -  0700 In: 1077 [I.V.:1650] Out: 1500 [Urine:1500] PHYSICAL EXAM:  
General:                    Lying in bed in no acute distress,increased energy today HEENT:                     Pupils equal.  Sclera anicteric. Conjunctiva pink. Mucous membranes dry CV:                  Regular rate and rhythm. Lungs:                       Clear to auscultation bilaterally. No Wheezing or Rhonchi. No rales. Abdomen:                  Soft, non-tender. Not distended. Bowel sounds normal. 
Extremities:               Still red,warn with mild tenderness right leg. No cyanosis. No edema. No clubbing Neurologic:                Alert and oriented X 3. Data Review Recent Results (from the past 12 hour(s)) CBC WITH AUTOMATED DIFF Collection Time: 18  4:46 AM  
Result Value Ref Range WBC 12.9 4.6 - 13.2 K/uL  
 RBC 4.03 (L) 4.20 - 5.30 M/uL HGB 8.8 (L) 12.0 - 16.0 g/dL HCT 27.9 (L) 35.0 - 45.0 % MCV 69.2 (L) 74.0 - 97.0 FL  
 MCH 21.8 (L) 24.0 - 34.0 PG  
 MCHC 31.5 31.0 - 37.0 g/dL RDW 20.2 (H) 11.6 - 14.5 % PLATELET 889 330 - 895 K/uL NEUTROPHILS 92 (H) 40 - 73 % LYMPHOCYTES 3 (L) 21 - 52 % MONOCYTES 5 3 - 10 % EOSINOPHILS 0 0 - 5 %  BASOPHILS 0 0 - 2 % ABS. NEUTROPHILS 11.8 (H) 1.8 - 8.0 K/UL  
 ABS. LYMPHOCYTES 0.4 (L) 0.9 - 3.6 K/UL  
 ABS. MONOCYTES 0.7 0.05 - 1.2 K/UL  
 ABS. EOSINOPHILS 0.0 0.0 - 0.4 K/UL  
 ABS. BASOPHILS 0.0 0.0 - 0.1 K/UL  
 DF AUTOMATED METABOLIC PANEL, COMPREHENSIVE Collection Time: 07/29/18  4:46 AM  
Result Value Ref Range Sodium 140 136 - 145 mmol/L Potassium 3.4 (L) 3.5 - 5.5 mmol/L Chloride 105 100 - 108 mmol/L  
 CO2 25 21 - 32 mmol/L Anion gap 10 3.0 - 18 mmol/L Glucose 115 (H) 74 - 99 mg/dL BUN 21 (H) 7.0 - 18 MG/DL Creatinine 1.84 (H) 0.6 - 1.3 MG/DL  
 BUN/Creatinine ratio 11 (L) 12 - 20 GFR est AA 35 (L) >60 ml/min/1.73m2 GFR est non-AA 29 (L) >60 ml/min/1.73m2 Calcium 8.0 (L) 8.5 - 10.1 MG/DL Bilirubin, total 0.6 0.2 - 1.0 MG/DL  
 ALT (SGPT) 10 (L) 13 - 56 U/L  
 AST (SGOT) 24 15 - 37 U/L Alk. phosphatase 64 45 - 117 U/L Protein, total 6.7 6.4 - 8.2 g/dL Albumin 2.5 (L) 3.4 - 5.0 g/dL Globulin 4.2 (H) 2.0 - 4.0 g/dL A-G Ratio 0.6 (L) 0.8 - 1.7 GLUCOSE, POC Collection Time: 07/29/18  8:42 AM  
Result Value Ref Range Glucose (POC) 116 (H) 70 - 110 mg/dL GLUCOSE, POC Collection Time: 07/29/18 11:05 AM  
Result Value Ref Range Glucose (POC) 143 (H) 70 - 110 mg/dL Assessment/Plan:  
 
Principal Problem: 
  Sepsis ([facility] (7/28/2018) Active Problems: 
  Type 2 diabetes mellitus ([facility] (9/2/2013) Sarcoidosis (9/2/2013) CKD (chronic kidney disease) stage 3, GFR 30-59 ml/min (9/2/2013) Overview: Baseline Cr ~1.3, eGFR ~47, Nephrologist Dr. Tab Flores. Morbid obesity ([facility] (9/2/2013) Overview: BMI 42 with hypertension and type 2 DM. Cellulitis of right leg (7/28/2018) JOON (acute kidney injury) ([facility] (7/28/2018) Chronic anemia (7/28/2018) Care Plan 1. Sepsis due to cellulitis of rt leg/Bacteremia 
  -Received vanc and ceftriaxone in ER.  
  -IV fluid 
  -On vanc and add zosyn 
  -Blood gram stain c/w GPC in pairs. 
  -Lact acid not elevated 
  -Continue to monitor extend of cellulitis,so far still in previous limits 
  -ID consult in am 
  
2. JOON on CKD 2 
  -On iv fluid 
  -Creatinine improving - 1.84 today 
  3. Hematuria and bacteruria  
  -H/o dysfunctional uterine bleeding,fibroids 
  -Ordered Ucx - no results yet in the chart 
  -Monitor if significant blood loss,monitor Hgb 
  -Has required transfusions in the past due to the bleeding. 
  -US abdomen 
  4. Chronic anemia 
  -On Feso4 
  -Monitor Hgb 
  
5. Type 2 diabetes mellitus  
  -On ssi 
  
6. Sarcoidosis -CXR showing hilar adenopathy  
 -Duo-neb 
  
7. Morbid obesity  
 -Supportive care 
 -Weight loss education. 
  
8. HTN 
 -Lopressor resumed,holding other bp meds 
 -Monitor closely bp meds and adjust if necessary 
  
DVT prophylaxis:sc heparin Full code Disposition:tbd

## 2018-07-29 NOTE — PROGRESS NOTES
Problem: Falls - Risk of 
Goal: *Absence of Falls Document Redgie Curet Fall Risk and appropriate interventions in the flowsheet. Outcome: Progressing Towards Goal 
Fall Risk Interventions: 
Mobility Interventions: Bed/chair exit alarm Elimination Interventions: Bed/chair exit alarm, Call light in reach Problem: Pressure Injury - Risk of 
Goal: *Prevention of pressure injury Document Aureliano Scale and appropriate interventions in the flowsheet. Outcome: Progressing Towards Goal 
Pressure Injury Interventions: 
Sensory Interventions: Assess changes in LOC Moisture Interventions: Maintain skin hydration (lotion/cream) Activity Interventions: Pressure redistribution bed/mattress(bed type) Mobility Interventions: Pressure redistribution bed/mattress (bed type) Nutrition Interventions: Document food/fluid/supplement intake

## 2018-07-29 NOTE — MANAGEMENT PLAN
Discharge/Transition Planning Interviewed patient. Verified demographics listed on face sheet with patient; all information correct. Pt has [facility]. Patient stated their PCP is RAMU Rick and last appt 2 months ago. Patient lives in apartment and daughter, Carlita Rosales lives with her . Patient's NOK is son and daughter. Patient independent with ADLs prior to admission. No use of DME prior to admission. Supposed to have sleep study for sleep apnea. Discharge plan is Home and will need Medicaid cab Patient has designated ___son and daughter_____________________ to participate in his/her discharge plan and to receive any needed information. Name: Miranda Herrera Phone [phone] Name: Carlita Rosales (did not have number with her) Care Management Interventions PCP Verified by CM: Yes (RAMU Rick) Last Visit to PCP: 05/29/18 Mode of Transport at Discharge: Other (see comment) (cab) Transition of Care Consult (CM Consult): Discharge Planning Current Support Network: Other (apartment with daughter) Confirm Follow Up Transport: Cab Plan discussed with Pt/Family/Caregiver: Yes Discharge Location Discharge Placement: Home Reason for Admission:   Sepsis RRAT Score:   12 Plan for utilizing home health:      tbd Likelihood of Readmission:  low Transition of Care Plan:       Home Sincere Hennessy RN BSN Outcomes Manager Pager # [phone]

## 2018-07-29 NOTE — ROUTINE PROCESS
1935 Bedside and Verbal shift change  Received from [facility] , [address] [facility] (outgoing nurse), to TWIN Ward (oncoming)  Pt. Is AOX 3. IV patent and infusing well, Pt. denies  pain at this time. Report included the following information SBAR, Kardex, Procedure Summary, Intake/Output, MAR, Recent Lab Results, and  Cardiac Rhythm @ NSr. Will resume care and monitor Pt. Condition. Pt. Educated on call bell when in need of help and assistance. Pt. verbalized understanding. Pt. Head to toe Assessment Done and documented. 2030  Pt. In bed, no sign of distress. 2150  Pt. Encouraged to increased fluid intake, Pt. Served H2o and pt had 100ml of water. 2152  Pt. Has + Blood culture in anearobic gram +cocci in pairs and chain. MD notified. [address] [facility]  Dr Clemente Salazar notified of Pt. Blood culture result. MD made no orders. Pt. Resting comfortably. . Temp @99.1 
 
0130  Pt. Made no complaints. 0330  Pt. Given complete bath per chlorhexidene soap, catarino care, back care, change gown. 0530  Pt. Able to rest well throughout the shift.  
 
0700  No complaints made. Met all needs. Verbal and bedside Shift changed report given to Carlos Uribe RN (oncoming RN) on Pt. Condition. Report consisted of patients Situation, History, Activities, intake/output,  Background, Assessment and Recommendations(SBAR). Information from the following report(s) Kardex, order Summary, Lab results and MAR was reviewed with the receiving nurse. Opportunity for questions and clarification was provided.

## 2018-07-29 NOTE — PROGRESS NOTES
conducted an initial consultation and spiritual assessment for ADRIAN Trotter, who is a 46 y.o.,female. Patients primary language is: [place]. According to the patients EMR, her Adventist affiliation is: No preference. The  provided the following interventions: 
Initiated a relationship of care and support. Explored issues of benito, belief, spirituality and Adventist/ritual needs while hospitalized. Listened empathically. Provided information about Spiritual Care Services. Offered prayer and assurance of continued prayers on patient's behalf. Chart reviewed. The following outcomes were achieved: 
Patient shared limited information about her medical situation and spiritual journey/beliefs. Patient expressed feelings about current hospitalization. Patient expressed gratitude for the 's visit. Assessment: 
Patient did not indicate any Adventist/cultural needs that will affect her preferences in health care. Patient did not indicate any spiritual or Adventist issues which require further Spiritual Care Services interventions at this time. Plan: 
Chaplains will continue to follow and will provide pastoral care on an as- needed/requested basis. [facility] Certified  [address] [place] [address]  
[phone]

## 2018-07-30 ENCOUNTER — APPOINTMENT (OUTPATIENT)
Dept: ULTRASOUND IMAGING | Age: 48
DRG: 720 | End: 2018-07-30
Attending: INTERNAL MEDICINE
Payer: MEDICAID

## 2018-07-30 LAB
ALBUMIN SERPL-MCNC: 2.4 G/DL (ref 3.4–5)
ALBUMIN/GLOB SERPL: 0.6 {RATIO} (ref 0.8–1.7)
ALP SERPL-CCNC: 59 U/L (ref 45–117)
ALT SERPL-CCNC: 10 U/L (ref 13–56)
ANION GAP SERPL CALC-SCNC: 8 MMOL/L (ref 3–18)
AST SERPL-CCNC: 20 U/L (ref 15–37)
BACTERIA SPEC CULT: ABNORMAL
BACTERIA SPEC CULT: ABNORMAL
BASOPHILS # BLD: 0 K/UL (ref 0–0.1)
BASOPHILS NFR BLD: 0 % (ref 0–2)
BILIRUB SERPL-MCNC: 0.4 MG/DL (ref 0.2–1)
BUN SERPL-MCNC: 19 MG/DL (ref 7–18)
BUN/CREAT SERPL: 12 (ref 12–20)
CALCIUM SERPL-MCNC: 8.3 MG/DL (ref 8.5–10.1)
CHLORIDE SERPL-SCNC: 106 MMOL/L (ref 100–108)
CO2 SERPL-SCNC: 25 MMOL/L (ref 21–32)
CREAT SERPL-MCNC: 1.61 MG/DL (ref 0.6–1.3)
DIFFERENTIAL METHOD BLD: ABNORMAL
EOSINOPHIL # BLD: 0.1 K/UL (ref 0–0.4)
EOSINOPHIL NFR BLD: 1 % (ref 0–5)
ERYTHROCYTE [DISTWIDTH] IN BLOOD BY AUTOMATED COUNT: 20.3 % (ref 11.6–14.5)
GLOBULIN SER CALC-MCNC: 4.1 G/DL (ref 2–4)
GLUCOSE BLD STRIP.AUTO-MCNC: 127 MG/DL (ref 70–110)
GLUCOSE BLD STRIP.AUTO-MCNC: 97 MG/DL (ref 70–110)
GLUCOSE BLD STRIP.AUTO-MCNC: 98 MG/DL (ref 70–110)
GLUCOSE BLD STRIP.AUTO-MCNC: 98 MG/DL (ref 70–110)
GLUCOSE SERPL-MCNC: 89 MG/DL (ref 74–99)
GRAM STN SPEC: ABNORMAL
HCT VFR BLD AUTO: 25.7 % (ref 35–45)
HGB BLD-MCNC: 8 G/DL (ref 12–16)
LYMPHOCYTES # BLD: 0.9 K/UL (ref 0.9–3.6)
LYMPHOCYTES NFR BLD: 9 % (ref 21–52)
MCH RBC QN AUTO: 21.3 PG (ref 24–34)
MCHC RBC AUTO-ENTMCNC: 31.1 G/DL (ref 31–37)
MCV RBC AUTO: 68.5 FL (ref 74–97)
MONOCYTES # BLD: 1 K/UL (ref 0.05–1.2)
MONOCYTES NFR BLD: 10 % (ref 3–10)
NEUTS SEG # BLD: 8.6 K/UL (ref 1.8–8)
NEUTS SEG NFR BLD: 80 % (ref 40–73)
PLATELET # BLD AUTO: 172 K/UL (ref 135–420)
POTASSIUM SERPL-SCNC: 3.3 MMOL/L (ref 3.5–5.5)
PROT SERPL-MCNC: 6.5 G/DL (ref 6.4–8.2)
RBC # BLD AUTO: 3.75 M/UL (ref 4.2–5.3)
SERVICE CMNT-IMP: ABNORMAL
SERVICE CMNT-IMP: ABNORMAL
SODIUM SERPL-SCNC: 139 MMOL/L (ref 136–145)
WBC # BLD AUTO: 10.7 K/UL (ref 4.6–13.2)

## 2018-07-30 PROCEDURE — 74011250637 HC RX REV CODE- 250/637: Performed by: NURSE PRACTITIONER

## 2018-07-30 PROCEDURE — 74011250636 HC RX REV CODE- 250/636: Performed by: EMERGENCY MEDICINE

## 2018-07-30 PROCEDURE — 76770 US EXAM ABDO BACK WALL COMP: CPT

## 2018-07-30 PROCEDURE — 87086 URINE CULTURE/COLONY COUNT: CPT | Performed by: INTERNAL MEDICINE

## 2018-07-30 PROCEDURE — 74011250637 HC RX REV CODE- 250/637: Performed by: INTERNAL MEDICINE

## 2018-07-30 PROCEDURE — 74011000250 HC RX REV CODE- 250: Performed by: INTERNAL MEDICINE

## 2018-07-30 PROCEDURE — 74011250636 HC RX REV CODE- 250/636: Performed by: INTERNAL MEDICINE

## 2018-07-30 PROCEDURE — 94640 AIRWAY INHALATION TREATMENT: CPT

## 2018-07-30 PROCEDURE — 85025 COMPLETE CBC W/AUTO DIFF WBC: CPT | Performed by: INTERNAL MEDICINE

## 2018-07-30 PROCEDURE — 74011000258 HC RX REV CODE- 258: Performed by: INTERNAL MEDICINE

## 2018-07-30 PROCEDURE — 80053 COMPREHEN METABOLIC PANEL: CPT | Performed by: INTERNAL MEDICINE

## 2018-07-30 PROCEDURE — 94760 N-INVAS EAR/PLS OXIMETRY 1: CPT

## 2018-07-30 PROCEDURE — 87040 BLOOD CULTURE FOR BACTERIA: CPT | Performed by: INTERNAL MEDICINE

## 2018-07-30 PROCEDURE — 82962 GLUCOSE BLOOD TEST: CPT

## 2018-07-30 PROCEDURE — 65660000000 HC RM CCU STEPDOWN

## 2018-07-30 PROCEDURE — 36415 COLL VENOUS BLD VENIPUNCTURE: CPT | Performed by: INTERNAL MEDICINE

## 2018-07-30 RX ORDER — POTASSIUM CHLORIDE 20 MEQ/1
40 TABLET, EXTENDED RELEASE ORAL
Status: COMPLETED | OUTPATIENT
Start: 2018-07-30 | End: 2018-07-30

## 2018-07-30 RX ADMIN — SODIUM CHLORIDE 750 MG: 900 INJECTION, SOLUTION INTRAVENOUS at 03:42

## 2018-07-30 RX ADMIN — SODIUM CHLORIDE 1000 MG: 900 INJECTION, SOLUTION INTRAVENOUS at 16:44

## 2018-07-30 RX ADMIN — FERROUS GLUCONATE TAB 324 MG (37.5 MG ELEMENTAL IRON) 1 TABLET: 324 (37.5 FE) TAB at 10:55

## 2018-07-30 RX ADMIN — SIMVASTATIN 20 MG: 20 TABLET, FILM COATED ORAL at 21:32

## 2018-07-30 RX ADMIN — POTASSIUM CHLORIDE 40 MEQ: 1500 TABLET, EXTENDED RELEASE ORAL at 16:44

## 2018-07-30 RX ADMIN — IPRATROPIUM BROMIDE AND ALBUTEROL SULFATE 3 ML: .5; 3 SOLUTION RESPIRATORY (INHALATION) at 07:35

## 2018-07-30 RX ADMIN — IPRATROPIUM BROMIDE AND ALBUTEROL SULFATE 3 ML: .5; 3 SOLUTION RESPIRATORY (INHALATION) at 13:40

## 2018-07-30 RX ADMIN — HEPARIN SODIUM 5000 UNITS: 5000 INJECTION, SOLUTION INTRAVENOUS; SUBCUTANEOUS at 16:44

## 2018-07-30 RX ADMIN — HEPARIN SODIUM 5000 UNITS: 5000 INJECTION, SOLUTION INTRAVENOUS; SUBCUTANEOUS at 00:25

## 2018-07-30 RX ADMIN — HEPARIN SODIUM 5000 UNITS: 5000 INJECTION, SOLUTION INTRAVENOUS; SUBCUTANEOUS at 23:43

## 2018-07-30 RX ADMIN — Medication 10 ML: at 21:33

## 2018-07-30 RX ADMIN — ACETAMINOPHEN 650 MG: 325 TABLET, FILM COATED ORAL at 21:40

## 2018-07-30 RX ADMIN — PIPERACILLIN SODIUM,TAZOBACTAM SODIUM 3.38 G: 3; .375 INJECTION, POWDER, FOR SOLUTION INTRAVENOUS at 14:42

## 2018-07-30 RX ADMIN — LABETALOL HYDROCHLORIDE 300 MG: 100 TABLET, FILM COATED ORAL at 10:55

## 2018-07-30 RX ADMIN — PIPERACILLIN SODIUM,TAZOBACTAM SODIUM 3.38 G: 3; .375 INJECTION, POWDER, FOR SOLUTION INTRAVENOUS at 06:24

## 2018-07-30 RX ADMIN — VENLAFAXINE HYDROCHLORIDE 150 MG: 150 CAPSULE, EXTENDED RELEASE ORAL at 10:55

## 2018-07-30 RX ADMIN — HEPARIN SODIUM 5000 UNITS: 5000 INJECTION, SOLUTION INTRAVENOUS; SUBCUTANEOUS at 10:55

## 2018-07-30 RX ADMIN — SODIUM CHLORIDE 100 ML/HR: 900 INJECTION, SOLUTION INTRAVENOUS at 21:42

## 2018-07-30 RX ADMIN — Medication 10 ML: at 07:41

## 2018-07-30 RX ADMIN — ACETAMINOPHEN 650 MG: 325 TABLET, FILM COATED ORAL at 00:25

## 2018-07-30 RX ADMIN — Medication 10 ML: at 16:45

## 2018-07-30 RX ADMIN — LABETALOL HYDROCHLORIDE 300 MG: 100 TABLET, FILM COATED ORAL at 16:46

## 2018-07-30 RX ADMIN — PIPERACILLIN SODIUM,TAZOBACTAM SODIUM 3.38 G: 3; .375 INJECTION, POWDER, FOR SOLUTION INTRAVENOUS at 21:33

## 2018-07-30 RX ADMIN — IPRATROPIUM BROMIDE AND ALBUTEROL SULFATE 3 ML: .5; 3 SOLUTION RESPIRATORY (INHALATION) at 02:07

## 2018-07-30 RX ADMIN — IPRATROPIUM BROMIDE AND ALBUTEROL SULFATE 3 ML: .5; 3 SOLUTION RESPIRATORY (INHALATION) at 20:22

## 2018-07-30 NOTE — INTERDISCIPLINARY ROUNDS
IDR Summary Patient: Linda Jacobson MRN: [id number]    Age: 46 y.o.  : 1970 Admit Diagnosis: Sepsis ([address] Problems pertinent to hospital stay:  
 
Consults:Case Management Testing due for patient today? YES Nutrition plan:Yes Mobility needs: Yes Lines/Tubes:  
IV: YES   Needed: YES [place]: NO  Needed:NO Central Line: NO Needed: NO   
 
VTE Prophylaxis: Chemical 
 
 
Care Management: 
Discharge plan: home PCP: Regan Monet NP : NO Financial concerns:No  
Interventions: LOS: 2 days Expected days until discharge: TBD Signed: LAURYN Garcia 7 [facility] Division Pager:  [phone] Office:  [phone]

## 2018-07-30 NOTE — CONSULTS
2                                                                     INFECTIOUS DISEASE CONSULT NOTE       Requested by: Dr Ari Mart    Reason for consult: cellulitis, BSI    Date of admission: 7/28/2018    Date of consult: July 30, 2018      ABX:     Current abx Prior abx    Vanco/Zosyn 7/28-2      ASSESSMENT: -- RECOMMENDATIONS      Sepsis POA  - fever, leucocytosis, tachycardia, JOON  - suspect sec to cellulitis of leg - fever improving  - WBc down to 10k  - Positive blcx for BHS- should be covered by current Abx  - finalize Abx in am based on further results  - d/w medicine team   BSi BHS  - suspect legs  - no obvious abscess - repeat blcx x2   - await final ID and sensitivity  - Abx as above       Cellulitis- RLE  - in setting of ch lymphedema  - difficult to evaluate if any underlying abscess - elevate leg  - no open ulcer but dry patches and scabs- at risk for BSI  - check PVL  - low threshold for CT   JOON on CKD- 2.08 on admission - now 1.61  - dose Abx for current crcl  - Avoid nephrotoxic meds  - monitor on combo- vanco+zosyn as more nephrotoxic   Hematuria- no urinary pain, abd pain  - no h/o stone - plan for US to evaluate further   Ch anemia ? Sec to DUB ? hematuria    Sarcoidosis    DM2    MO    HTN      MICROBIOLOGY:     7/28 blcx x2 BHS  7/30 ucx neg    LINES/DRAINS:     PIV    HPI:     Ms Agnes Murillo is a 46 y.o. Pleasant AA female with PMH of CKD3, Dysfunctional uterine bleeding, Diabetes mellitus, HTN, MO, anemia, ch leg edema who came to ED 7/28 with c/o fever, malaise, myalgia and chills. She says her legs are always swollen and goes back and forth. She picks at her skin and had small abrasions on legs. Her rt leg got swelled few days ago. She developed chills and hence came to ED. In the ER, her Temp was 103.2 and she was tachycardic. Blood work showed wbc of 21.6, hbg 9.1 and creat 2.07. She was found with  cellulitis of right leg. She was admitted for sepsis sec to cellulitis.  She was started on IV Cftriaxone and Vanc in the ER but later on switched to Vanco and Zosyn. Blood cx showed GPC in pairs on gram stain. We were asked for further eval and management.       Past medical history:     Past Medical History:   Diagnosis Date    Anemia     Bone lesion     Chronic kidney disease     Depressed     [facility]     Diabetes mellitus ([facility]     Hypertension     Kidney problem     Pneumonia     Sarcoidosis     Thyroid disorder     Uterus problem        Social History:     Social History     Social History    Marital status: SINGLE     Spouse name: N/A    Number of children: N/A    Years of education: N/A     Occupational History    Not on file. Social History Main Topics    Smoking status: Former Smoker     Types: Cigarettes    Smokeless tobacco: Former User    Alcohol use Yes      Comment: beer rarely    Drug use: No    Sexual activity: Not on file     Other Topics Concern    Not on file     Social History Narrative       Family History:     Family History   Problem Relation Age of Onset    Hypertension Mother     Diabetes Mother     Cancer Mother     Cancer Sister        Allergies:   No Known Allergies      Home Medications:     Prescriptions Prior to Admission   Medication Sig    acetaminophen (TYLENOL) 325 mg tablet Take 2 Tabs by mouth every four (4) hours as needed for Pain.  cyclobenzaprine (FLEXERIL) 5 mg tablet Take 1 Tab by mouth three (3) times daily as needed for Muscle Spasm(s).  budesonide-formoterol (SYMBICORT) 160-4.5 mcg/actuation HFAA Take 2 Puffs by inhalation two (2) times a day.  MULTIVITAMIN PO Take  by mouth.  acyclovir (ZOVIRAX) 400 mg tablet Take 400 mg by mouth daily.  ferrous sulfate 324 mg (65 mg iron) tablet Take 324 mg by mouth Daily (before breakfast).  venlafaxine-SR (EFFEXOR-XR) 150 mg capsule Take  by mouth daily.  labetalol (NORMODYNE) 200 mg tablet Take 300 mg by mouth two (2) times a day.     simvastatin (ZOCOR) 20 mg tablet Take 20 mg by mouth nightly.  NIFEdipine CR (ADALAT CC) 90 mg CR tablet Take 90 mg by mouth daily. Current Medications:     Current Facility-Administered Medications   Medication Dose Route Frequency    piperacillin-tazobactam (ZOSYN) 3.375 g in 0.9% sodium chloride (MBP/ADV) 100 mL MBP - ### EXTENDED 4 HOUR INFUSION###  3.375 g IntraVENous Q8H    [START ON 7/31/2018] VANCOMYCIN INFORMATION NOTE   Other ONCE    sodium chloride (NS) flush 5-10 mL  5-10 mL IntraVENous PRN    vancomycin (VANCOCIN) 750 mg in 0.9% sodium chloride (MBP/ADV) 250 mL ADV  750 mg IntraVENous Q12H    VANCOMYCIN INFORMATION NOTE 1 Each  1 Each Other Rx Dosing/Monitoring    acetaminophen (TYLENOL) tablet 650 mg  650 mg Oral Q6H PRN    cyclobenzaprine (FLEXERIL) tablet 5 mg  5 mg Oral TID PRN    ferrous gluconate 324 mg (37.5 mg iron) tablet 1 Tab  324 mg Oral ACB    labetalol (NORMODYNE) tablet 300 mg  300 mg Oral BID    simvastatin (ZOCOR) tablet 20 mg  20 mg Oral QHS    venlafaxine-SR (EFFEXOR-XR) capsule 150 mg  150 mg Oral DAILY    sodium chloride (NS) flush 5-10 mL  5-10 mL IntraVENous Q8H    sodium chloride (NS) flush 5-10 mL  5-10 mL IntraVENous PRN    HYDROcodone-acetaminophen (NORCO) 7.5-325 mg per tablet 1 Tab  1 Tab Oral Q6H PRN    heparin (porcine) injection 5,000 Units  5,000 Units SubCUTAneous Q8H    0.9% sodium chloride infusion  100 mL/hr IntraVENous CONTINUOUS    insulin lispro (HUMALOG) injection   SubCUTAneous AC&HS    hydrALAZINE (APRESOLINE) 20 mg/mL injection 10 mg  10 mg IntraVENous Q6H PRN    albuterol-ipratropium (DUO-NEB) 2.5 MG-0.5 MG/3 ML  3 mL Nebulization Q6H RT       Review of Systems:   12 points ROS done. Pertinent positives and negatives are as follows, ROS otherwise negative. Constitutional: +ve fever, chills, No diaphoresis and unexpected weight change. HENT: Negative for ear pain, congestion, sore throat, rhinorrhea.    Eyes: Negative for pain, redness and visual disturbance. Respiratory: negative for shortness of breath, cough, chest tightness, wheezing. Cardiovascular: Negative for chest pain, palpitations. Gastrointestinal: Negative for nausea, vomiting, abdominal pain or diarrhea  Genitourinary: Negative for dysuria but +ve hematuria  Musculoskeletal: +ve gen aches and pains, RT leg with increased redness and pain   Skin: +ve for rt leg rash  Neurological: Negative for dizziness, syncope, light-headedness or headaches. Hematological:Negative for easy bruising or bleeding. Psychiatric/Behavioral: Negative anxiety, depression. Physical Exam:  Vitals  Temp (24hrs), Av.2 °F (37.3 °C), Min:98.2 °F (36.8 °C), Max:100.4 °F (38 °C)    Visit Vitals    /88 (BP 1 Location: Right arm, BP Patient Position: At rest)    Pulse 89    Temp 98.2 °F (36.8 °C)    Resp 18    Ht 5' 5\" (1.651 m)    Wt 153.7 kg (338 lb 14.4 oz)    SpO2 96%    BMI 56.4 kg/m2       General: Well-developed, MO, 46y.o. year-old, female, in no acute distress  HEENT: Normocephalic, anicteric sclerae, Pupils equal, round reactive to light, no oropharyngeal lesions. No sinus tenderness. Neck: Supple, no lymphadenopathy, masses or thyromegaly  Chest: Symmetrical expansion  Lungs: Clear to auscultation bilaterally, no dullness  Heart: Regular rhythm, no murmurs, rubs or gallops, No JVD  Abdomen: Soft,obese, non-tender,non distended, no organomegaly, BS+  Musculoskeletal: large legs,3+ edema- ch, unable to define knees, Rt leg with warmth, redness and dry scaly scabs, no open ulcer, tender. Left lef with swelling without erythema and warmth. CNS: AAOx3. Cranial nerves II-XII intact. Grossly normal.No NR  SKIN: No other skin lesion or rash.  Dry, warm, intact          Labs: Results:   Chemistry Recent Labs      18   0400  18   0446  18   1115   GLU  89  115*  90   NA  139  140  135*   K  3.3*  3.4*  3.7   CL  106  105  100   CO2  25  25  26   BUN  19*  21*  16   CREA 1.61*  1.84*  2.07*   CA  8.3*  8.0*  8.1*   AGAP  8  10  9   BUCR  12  11*  8*   AP  59  64  69   TP  6.5  6.7  7.9   ALB  2.4*  2.5*  2.8*   GLOB  4.1*  4.2*  5.1*   AGRAT  0.6*  0.6*  0.5*      CBC w/Diff Recent Labs      07/30/18   0400  07/29/18   0446  07/28/18   1115   WBC  10.7  12.9  21.6*   RBC  3.75*  4.03*  4.16*   HGB  8.0*  8.8*  9.1*   HCT  25.7*  27.9*  28.9*   PLT  172  146  192   GRANS  80*  92*  93*   LYMPH  9*  3*  2*   EOS  1  0  0      Microbiology Recent Labs      07/28/18   1115  07/28/18   1100   CULT  AEROBIC AND ANAEROBIC BOTTLES STREPTOCOCCI, BETA HEMOLYTIC GROUP G*  AEROBIC AND ANAEROBIC BOTTLES STREPTOCOCCI, BETA HEMOLYTIC GROUP G*          Imaging-      Results from Hospital Encounter encounter on 07/28/18   XR CHEST PORT   Narrative Chest, single view    Indication: Fever, chills, body aches    Comparison: Chest radiograph 8/26/2017; CT chest 1/3/2018    Findings:  Portable upright AP view of the chest was obtained. The lungs mildly underexpanded. No focal pneumonic consolidation, pneumothorax  or pleural effusion. Cardiac size and mediastinal contours are stable, with  redemonstration of multiple calcified mediastinal and hilar lymph nodes,  unchanged. No acute osseous abnormality. Impression IMPRESSION:  1. Mildly underexpanded lungs, without focal pneumonic consolidation or pleural  effusion. 2. Unchanged radiographic appearance to calcified mediastinal and hilar lymph  nodes. Results from [place] encounter on 01/02/18   CT CHEST WO CONT   Narrative COMPARISON: Chest CT 8/26/2017. INDICATION: Follow-up pulmonary nodule and atypical infiltrate seen previously. TECHNIQUE: Axial was performed through the chest without contrast.  Coronal and  sagittal reformations were generated.      One or more dose reduction techniques were used on this CT: automated exposure  control, adjustment of the mAs and/or kVp according to patient's size, and  iterative reconstruction techniques. The specific techniques utilized on this CT  exam have been documented in the patient's electronic medical record.    ============    LUNGS: Scattered mosaic attenuation is seen throughout the inferior aspect of  right and left upper lobes, throughout the posterior aspects of the right middle  lobe, and heterogeneously throughout left and right lower lobes. Subsolid,  groundglass nodules seen within the lateral aspect of the right upper lobe  (image 42), measuring 4 mm. Another 4 mm subpleural groundglass nodules seen  within the right lower lobe superior segment (image 48). No solid pulmonary  nodule within the aerated lung parenchyma. Majority of the faint centrilobular  nodules seen on prior chest CT of 8/26/2017 have resolved. PLEURA: Normal, with no effusion or pneumothorax. AIRWAY: Unremarkable. MEDIASTINUM: There is asymmetric enlargement of the left thyroid lobe, without  discrete nodularity on this unenhanced CT scan. Mild to moderate global  cardiomegaly. Small pericardial effusion superficial to the right atrium. Moderate left anterior descending and left circumflex coronary artery  atherosclerotic calcification. Prominent main pulmonary artery, measuring 3.7  cm. Aneurysmal ascending thoracic aorta measuring 4.3 cm. Normal caliber  transverse and descending thoracic aorta. LYMPH NODES: There are several, heterogeneously calcified subcarinal,  paratracheal, and prevascular mediastinal lymph nodes. Mild background  mediastinal relatively diffuse lymphadenopathy. Calcified left and right hilar  lymph nodes are also present. No axillary or supraclavicular lymphadenopathy. UPPER ABDOMEN: Large 2.9 cm lamellated gallstone is seen within the gallbladder  fundus. Nonobstructive 4 mm calcification near the right renal hilum, probably  vascular. Small hiatal hernia. No acute abnormality in the visualized upper  abdomen. OTHER: No acute osseous abnormality.  Mild multilevel degenerative changes of the  thoracic spine.    ============         Impression IMPRESSION:    1. Slight interval progression of mosaic attenuation throughout the lungs with  small, probably infectious or inflammatory right upper and right lower lobe  subsolid pulmonary nodules. Primary considerations remain stigmata of chronic  small airway or small vessel disease. Prominent main pulmonary artery indicates  underlying pulmonary arterial hypertension and would seem to favor a vascular  etiology. No airway dilatation or bronchial wall thickening to indicate small  airway disease. 2. Heterogeneously calcified mediastinal and hilar lymph nodes, likely related  to patient's reported sarcoidosis. 3. Aneurysmal ascending thoracic aorta (4.3 cm). 4. Unchanged, asymmetric left thyroid lobe enlargement. This could be further  evaluated with a nonemergent thyroid ultrasound.            ---------------------------------------------------------------------------------------------------------------  I have independently examined the patient and reviewed all lab studies and imgaing as well as review of nursing notes and physican notes from the past 24 hours. The plan of care has been discussed with the patient/relative and all questions are answered. Dragon medical dictation software was used for portions of this report. Unintended errors may occur.      Bud Mijares MD  7/30/2018    [facility] AT THE [facility]  [phone]

## 2018-07-30 NOTE — PROGRESS NOTES
Nutrition initial assessment/Plan of care RECOMMENDATIONS:  
 
1. No concentrated sweets diet 2. Monitor weight, labs and PO intake 3. RD to follow GOALS:  
 
1. PO intake meets >75% of protein/calorie needs by 8/6 
2. Gradual weight loss (1-2 lb by 8/6) ASSESSMENT:  
 
Weight status is classified as obese per BMI of 56.4. PO intake is adequate. Labs noted. Patient with hypokalemia, hypocalcemia and hypoalbuminemia. Elevated BUN/Creatinine levels but trending down; GFR: 41. Nutrition recommendations listed. RD to follow. Nutrition Diagnoses:  
Obesity related to prior excessive energy intake as evidenced by BMI of 56.4. Nutrition Risk:  [] High  [] Moderate [x]  Low SUBJECTIVE/OBJECTIVE:  
  
Admitted with sepsis. Patient appears well nourished. Patient reports usually having a good appetite but food is bland in hospital. Reports eating half of lunch meal. States weight is variable. Reports losing weight a few years ago but has gained a lot of weight recently due to feeling depressed over death in family. Denies food allergies. States poor dentition but can manage regular texture foods. Recommend liberalizing diet to no concentrated sweets diet due to BG from  over past 24 hours and A1c of <3.5%. Will monitor. Information Obtained from:  
 [x] Chart Review [x] Patient 
 [] Family/Caregiver 
 [] Nurse/Physician 
 [] Interdisciplinary Meeting/Rounds Diet: Diabetic Consistent Carb diet Medications: [x] Reviewed (IVF: NS at 100 ml/hr) Allergies: [x] Reviewed Encounter Diagnoses ICD-10-CM ICD-9-CM 1. Sepsis, due to unspecified organism ([facility] A41.9 038.9  
  995.91  
2. JOON (acute kidney injury) ([facility] N17.9 584.9 3. Anemia, unspecified type D64.9 285.9 4. Cellulitis of right lower extremity L03.115 682.6 Past Medical History:  
Diagnosis Date  Anemia  Bone lesion  Chronic kidney disease  Depressed  Diabetes ([facility]  Diabetes mellitus ([facility]  Hypertension  Kidney problem  Pneumonia  Sarcoidosis  Thyroid disorder  Uterus problem Labs:   
Lab Results Component Value Date/Time Sodium 139 07/30/2018 04:00 AM  
 Potassium 3.3 (L) 07/30/2018 04:00 AM  
 Chloride 106 07/30/2018 04:00 AM  
 CO2 25 07/30/2018 04:00 AM  
 Anion gap 8 07/30/2018 04:00 AM  
 Glucose 89 07/30/2018 04:00 AM  
 BUN 19 (H) 07/30/2018 04:00 AM  
 Creatinine 1.61 (H) 07/30/2018 04:00 AM  
 Calcium 8.3 (L) 07/30/2018 04:00 AM  
 Magnesium 2.2 08/26/2017 03:31 PM  
 Phosphorus 4.6 01/09/2014 04:45 PM  
 Albumin 2.4 (L) 07/30/2018 04:00 AM  
 
Anthropometrics: BMI (calculated): 56.4 Last 3 Recorded Weights in this Encounter  
 07/28/18 1059 07/28/18 1556 Weight: 158.8 kg (350 lb) 153.7 kg (338 lb 14.4 oz) Ht Readings from Last 1 Encounters:  
07/28/18 5' 5\" (1.651 m) No data found. IBW: 125 lb %IBW: 271% Estimated Nutrition Needs:  
Calories: 3119-0215 Kcal Based on:   [x] Actual BW   
Protein:   108-120 g Based on:   [x] Actual BW Fluid:       2600 ml Based on:   [x] Actual BW  
 
 [x] No Cultural, Rastafari or ethnic dietary need identified. [] Cultural, Rastafari and ethnic food preferences identified and addressed Wt Status:  [] Normal (18.6 - 24.9) [] Underweight (<18.5) [] Overweight (25 - 29.9) [] Mild Obesity (30 - 34.9)  [] Moderate Obesity (35 - 39.9) [x] Morbid Obesity (40+) Nutrition Problems Identified:  
[] Suboptimal PO intake 
[] Food Allergies [] Difficulty chewing/swallowing/poor dentition 
[] Constipation/Diarrhea  
[] Nausea/Vomiting  
[x] None 
[] Other:  
 
Plan:  
[x] Therapeutic Diet 
[]  Obtained/adjusted food preferences/tolerances and/or snacks options  
[]  Supplements added  
[] Occupational therapy following for feeding techniques []  HS snack added  
[]  Modify diet texture  
[]  Modify diet for food allergies []  Assist with menu selection  
[x]  Monitor PO intake on meal rounds  
[x] Continue inpatient monitoring and intervention  
[]  Participated in discharge planning/Interdisciplinary rounds/Team meetings  
[]  Other:  
 
Education Needs: 
 [] Not appropriate for teaching at this time due to: 
 [x] Identified and addressed Nutrition Monitoring and Evaluation: 
[x] Continue ongoing monitoring and intervention 
[] Other [address]

## 2018-07-30 NOTE — PROGRESS NOTES
[address] [place], [place] [address] Inpatient Daily Progress Note Daily progress Note Patient: Jerome Mckay MRN: [id number]  CSN: [id number] YOB: 1970  Age: 46 y.o. Sex: female DOA: 7/28/2018 LOS:  LOS: 2 days Chief Complaint:  Sepsis Interval History: 45 y/o [place] American female with hx of CKD Stage III, dysfunctional uterine bleeding, DM, HTN, anemia and morbid obesity, presented to the ED on 7/28 with generalized malaise, myalgia, fever, chills since the night prior. Pt had temp of 103.2 in the ED and tachycardia. WBC of 21.6, creatinine of 2.07 (comparedto 1.63 on 5/14/2018). Physical exam noted cellulitis of RLE. Lactic acid 1.7. UA with 2+ bacteria. She was started on IV abx and admitted for further management of care. Blood cultures collected 7/28 note aerobic and anaerobic bottles streptococci, beta hemolytic Group G x 2. Assessment/Plan:  
 
Patient Active Problem List  
Diagnosis Code  Chronic blood loss anemia D50.0  Acute blood loss anemia D62  
 Unspecified essential hypertension I10  Type 2 diabetes mellitus (HCC) E11.9  Sarcoidosis D86.9  CKD (chronic kidney disease) stage 3, GFR 30-59 ml/min N18.3  Acute on chronic renal failure (HCC) N17.9, N18.9  Fibroids D25.9  Depression F32.9  Morbid obesity ([address] E66.01  
 Sepsis ([address] A41.9  Cellulitis of right leg L03.115  
 JOON (acute kidney injury) ([address] N17.9  Chronic anemia D64.9 A/P: 
Sepsis 
- febrile, tachycardia on admission, lactic acid normal 
- Cellulitis RLE  
- blood cultures collected 7/28 aerobic and anaerobic bottles streptococci, beta hemolytic Group G x 2 
- on IV Vancomycin and Zosyn - ID consulted today  
- repeat blood cultures pending 
- fever improving, WBC normal 
 
Cellulitis RLE 
- in setting of chronic lymphedema 
- PVL pending JOON on CKD Stage III  
- improving with IVF's - monitor BMP Hematuria and bacteruria - UA on admission with 2+ bacteria 
- hx of dysfunctional uterine bleeding, fibroids 
- urine culture pending 
- CBC stable 
- retroperitoneal U/S 7/30 pending Chronic anemia 
- stable, continue to monitor Hypertension - Labetalol 300 mg BID Diabetes 
- monitor accuchecks, correctional SSI 
 
DVT Prophylaxis 
- Heparin subcutaneously TID Ursula ZepedaALIREZA 7 [facility] Hospitalist Division Pager:  [phone] Office:  [phone] Subjective: Interval notes reviewed. Retroperitoneal U/s obtained. Dry patches RLE w/ erythema - no open ulcer/sore or oozing. Pt states she is a \",\" does not remember when wound initially appeared but states it was oozing. Objective:  
  
Visit Vitals  /86 (BP 1 Location: Right arm, BP Patient Position: At rest)  Pulse 86  Temp 98.4 °F (36.9 °C)  Resp 18  Ht 5' 5\" (1.651 m)  Wt 153.7 kg (338 lb 14.4 oz)  SpO2 93%  BMI 56.4 kg/m2 Physical Exam: 
General appearance: alert, cooperative, no distress, morbidly obese AA female Head: Normocephalic, without obvious abnormality, atraumatic Lungs: clear to auscultation bilaterally Heart: regular rate and rhythm, S1, S2 normal, no murmur, click, rub or gallop Abdomen: soft, non tender, non distended. Normoactive bowel sounds. Extremities: chronic lymphedema BLE, mild erythema RLE, dry scaly patches anterior RLE Skin: Skin color, texture, turgor normal. No rashes or lesions Neurologic: Grossly normal 
PSY: Mood and affect normal, appropriately behaved Intake and Output: 
Current Shift:  07/30 0701 - 07/30 1900 In: 1660 [I.V.:1660] Out: - Last three shifts:  07/28 1901 - 07/30 0700 In: 2998.3 [I.V.:2998.3] Out: 2500 [Urine:2500] Recent Results (from the past 24 hour(s)) GLUCOSE, POC Collection Time: 07/29/18 11:05 AM  
Result Value Ref Range  Glucose (POC) 143 (H) 70 - 110 mg/dL  
VANCOMYCIN, TROUGH Collection Time: 07/29/18 12:30 PM  
Result Value Ref Range Vancomycin,trough 16.4 10.0 - 20.0 ug/mL Reported dose date: [id number] Reported dose time: 100 Reported dose: 750MG VI Q12H UNITS  
GLUCOSE, POC Collection Time: 07/29/18  6:33 PM  
Result Value Ref Range Glucose (POC) 173 (H) 70 - 110 mg/dL GLUCOSE, POC Collection Time: 07/29/18  9:47 PM  
Result Value Ref Range Glucose (POC) 116 (H) 70 - 110 mg/dL CBC WITH AUTOMATED DIFF Collection Time: 07/30/18  4:00 AM  
Result Value Ref Range WBC 10.7 4.6 - 13.2 K/uL  
 RBC 3.75 (L) 4.20 - 5.30 M/uL HGB 8.0 (L) 12.0 - 16.0 g/dL HCT 25.7 (L) 35.0 - 45.0 % MCV 68.5 (L) 74.0 - 97.0 FL  
 MCH 21.3 (L) 24.0 - 34.0 PG  
 MCHC 31.1 31.0 - 37.0 g/dL RDW 20.3 (H) 11.6 - 14.5 % PLATELET 763 291 - 273 K/uL NEUTROPHILS 80 (H) 40 - 73 % LYMPHOCYTES 9 (L) 21 - 52 % MONOCYTES 10 3 - 10 % EOSINOPHILS 1 0 - 5 % BASOPHILS 0 0 - 2 %  
 ABS. NEUTROPHILS 8.6 (H) 1.8 - 8.0 K/UL  
 ABS. LYMPHOCYTES 0.9 0.9 - 3.6 K/UL  
 ABS. MONOCYTES 1.0 0.05 - 1.2 K/UL  
 ABS. EOSINOPHILS 0.1 0.0 - 0.4 K/UL  
 ABS. BASOPHILS 0.0 0.0 - 0.1 K/UL  
 DF AUTOMATED METABOLIC PANEL, COMPREHENSIVE Collection Time: 07/30/18  4:00 AM  
Result Value Ref Range Sodium 139 136 - 145 mmol/L Potassium 3.3 (L) 3.5 - 5.5 mmol/L Chloride 106 100 - 108 mmol/L  
 CO2 25 21 - 32 mmol/L Anion gap 8 3.0 - 18 mmol/L Glucose 89 74 - 99 mg/dL BUN 19 (H) 7.0 - 18 MG/DL Creatinine 1.61 (H) 0.6 - 1.3 MG/DL  
 BUN/Creatinine ratio 12 12 - 20 GFR est AA 41 (L) >60 ml/min/1.73m2 GFR est non-AA 34 (L) >60 ml/min/1.73m2 Calcium 8.3 (L) 8.5 - 10.1 MG/DL Bilirubin, total 0.4 0.2 - 1.0 MG/DL  
 ALT (SGPT) 10 (L) 13 - 56 U/L  
 AST (SGOT) 20 15 - 37 U/L Alk. phosphatase 59 45 - 117 U/L Protein, total 6.5 6.4 - 8.2 g/dL Albumin 2.4 (L) 3.4 - 5.0 g/dL Globulin 4.1 (H) 2.0 - 4.0 g/dL  A-G Ratio 0.6 (L) 0.8 - 1.7 GLUCOSE, POC Collection Time: 07/30/18  7:18 AM  
Result Value Ref Range Glucose (POC) 98 70 - 110 mg/dL Lab Results Component Value Date/Time Glucose 89 07/30/2018 04:00 AM  
 Glucose 115 (H) 07/29/2018 04:46 AM  
 Glucose 90 07/28/2018 11:15 AM  
 Glucose 82 05/14/2018 04:05 PM  
 Glucose 120 (H) 08/26/2017 03:31 PM  
  
 
Imaging: No results found. Medication List Reviewed: 
Current Facility-Administered Medications Medication Dose Route Frequency  piperacillin-tazobactam (ZOSYN) 3.375 g in 0.9% sodium chloride (MBP/ADV) 100 mL MBP - ### EXTENDED 4 HOUR INFUSION###  3.375 g IntraVENous Q8H  
 [START ON 7/31/2018] VANCOMYCIN INFORMATION NOTE   Other ONCE  
 sodium chloride (NS) flush 5-10 mL  5-10 mL IntraVENous PRN  
 vancomycin (VANCOCIN) 750 mg in 0.9% sodium chloride (MBP/ADV) 250 mL ADV  750 mg IntraVENous Q12H  VANCOMYCIN INFORMATION NOTE 1 Each  1 Each Other Rx Dosing/Monitoring  acetaminophen (TYLENOL) tablet 650 mg  650 mg Oral Q6H PRN  
 cyclobenzaprine (FLEXERIL) tablet 5 mg  5 mg Oral TID PRN  
 ferrous gluconate 324 mg (37.5 mg iron) tablet 1 Tab  324 mg Oral ACB  labetalol (NORMODYNE) tablet 300 mg  300 mg Oral BID  simvastatin (ZOCOR) tablet 20 mg  20 mg Oral QHS  venlafaxine-SR (EFFEXOR-XR) capsule 150 mg  150 mg Oral DAILY  sodium chloride (NS) flush 5-10 mL  5-10 mL IntraVENous Q8H  
 sodium chloride (NS) flush 5-10 mL  5-10 mL IntraVENous PRN  
 HYDROcodone-acetaminophen (NORCO) 7.5-325 mg per tablet 1 Tab  1 Tab Oral Q6H PRN  
 heparin (porcine) injection 5,000 Units  5,000 Units SubCUTAneous Q8H  
 0.9% sodium chloride infusion  100 mL/hr IntraVENous CONTINUOUS  
 insulin lispro (HUMALOG) injection   SubCUTAneous AC&HS  hydrALAZINE (APRESOLINE) 20 mg/mL injection 10 mg  10 mg IntraVENous Q6H PRN  
 albuterol-ipratropium (DUO-NEB) 2.5 MG-0.5 MG/3 ML  3 mL Nebulization Q6H RT

## 2018-07-30 NOTE — PROGRESS NOTES
2000-no signs of distress. Ordered meds given throughout night. Unremarkable night. Bedside shift change report given to BRET Espinosa (oncoming nurse) by Annette Archuleta (offgoing nurse). Report included the following information SBAR.

## 2018-07-31 ENCOUNTER — APPOINTMENT (OUTPATIENT)
Dept: VASCULAR SURGERY | Age: 48
DRG: 720 | End: 2018-07-31
Attending: INTERNAL MEDICINE
Payer: MEDICAID

## 2018-07-31 PROBLEM — R78.81 BACTEREMIA: Status: ACTIVE | Noted: 2018-07-31

## 2018-07-31 LAB
ANION GAP SERPL CALC-SCNC: 10 MMOL/L (ref 3–18)
BACTERIA SPEC CULT: NORMAL
BASOPHILS # BLD: 0 K/UL (ref 0–0.1)
BASOPHILS NFR BLD: 0 % (ref 0–2)
BUN SERPL-MCNC: 15 MG/DL (ref 7–18)
BUN/CREAT SERPL: 10 (ref 12–20)
CALCIUM SERPL-MCNC: 8.4 MG/DL (ref 8.5–10.1)
CHLORIDE SERPL-SCNC: 109 MMOL/L (ref 100–108)
CO2 SERPL-SCNC: 24 MMOL/L (ref 21–32)
CREAT SERPL-MCNC: 1.55 MG/DL (ref 0.6–1.3)
DIFFERENTIAL METHOD BLD: ABNORMAL
EOSINOPHIL # BLD: 0.3 K/UL (ref 0–0.4)
EOSINOPHIL NFR BLD: 3 % (ref 0–5)
ERYTHROCYTE [DISTWIDTH] IN BLOOD BY AUTOMATED COUNT: 20.4 % (ref 11.6–14.5)
GLUCOSE BLD STRIP.AUTO-MCNC: 107 MG/DL (ref 70–110)
GLUCOSE BLD STRIP.AUTO-MCNC: 110 MG/DL (ref 70–110)
GLUCOSE BLD STRIP.AUTO-MCNC: 87 MG/DL (ref 70–110)
GLUCOSE BLD STRIP.AUTO-MCNC: 93 MG/DL (ref 70–110)
GLUCOSE SERPL-MCNC: 81 MG/DL (ref 74–99)
HCT VFR BLD AUTO: 25.2 % (ref 35–45)
HGB BLD-MCNC: 7.9 G/DL (ref 12–16)
LYMPHOCYTES # BLD: 1.2 K/UL (ref 0.9–3.6)
LYMPHOCYTES NFR BLD: 12 % (ref 21–52)
MCH RBC QN AUTO: 21.4 PG (ref 24–34)
MCHC RBC AUTO-ENTMCNC: 31.3 G/DL (ref 31–37)
MCV RBC AUTO: 68.3 FL (ref 74–97)
MONOCYTES # BLD: 1.1 K/UL (ref 0.05–1.2)
MONOCYTES NFR BLD: 10 % (ref 3–10)
NEUTS SEG # BLD: 7.6 K/UL (ref 1.8–8)
NEUTS SEG NFR BLD: 75 % (ref 40–73)
PLATELET # BLD AUTO: 183 K/UL (ref 135–420)
POTASSIUM SERPL-SCNC: 3.6 MMOL/L (ref 3.5–5.5)
RBC # BLD AUTO: 3.69 M/UL (ref 4.2–5.3)
SERVICE CMNT-IMP: NORMAL
SODIUM SERPL-SCNC: 143 MMOL/L (ref 136–145)
WBC # BLD AUTO: 10.1 K/UL (ref 4.6–13.2)

## 2018-07-31 PROCEDURE — 94760 N-INVAS EAR/PLS OXIMETRY 1: CPT

## 2018-07-31 PROCEDURE — 74011000250 HC RX REV CODE- 250: Performed by: INTERNAL MEDICINE

## 2018-07-31 PROCEDURE — 94640 AIRWAY INHALATION TREATMENT: CPT

## 2018-07-31 PROCEDURE — 74011000258 HC RX REV CODE- 258: Performed by: INTERNAL MEDICINE

## 2018-07-31 PROCEDURE — 74011250637 HC RX REV CODE- 250/637: Performed by: INTERNAL MEDICINE

## 2018-07-31 PROCEDURE — 80048 BASIC METABOLIC PNL TOTAL CA: CPT | Performed by: INTERNAL MEDICINE

## 2018-07-31 PROCEDURE — 65660000000 HC RM CCU STEPDOWN

## 2018-07-31 PROCEDURE — 74011250636 HC RX REV CODE- 250/636: Performed by: INTERNAL MEDICINE

## 2018-07-31 PROCEDURE — 93970 EXTREMITY STUDY: CPT

## 2018-07-31 PROCEDURE — 85025 COMPLETE CBC W/AUTO DIFF WBC: CPT | Performed by: INTERNAL MEDICINE

## 2018-07-31 PROCEDURE — 82962 GLUCOSE BLOOD TEST: CPT

## 2018-07-31 PROCEDURE — 36415 COLL VENOUS BLD VENIPUNCTURE: CPT | Performed by: INTERNAL MEDICINE

## 2018-07-31 RX ORDER — ACYCLOVIR 200 MG/1
400 CAPSULE ORAL DAILY
Status: DISCONTINUED | OUTPATIENT
Start: 2018-08-01 | End: 2018-08-01 | Stop reason: HOSPADM

## 2018-07-31 RX ADMIN — IPRATROPIUM BROMIDE AND ALBUTEROL SULFATE 3 ML: .5; 3 SOLUTION RESPIRATORY (INHALATION) at 20:33

## 2018-07-31 RX ADMIN — SODIUM CHLORIDE 1000 MG: 900 INJECTION, SOLUTION INTRAVENOUS at 03:58

## 2018-07-31 RX ADMIN — VENLAFAXINE HYDROCHLORIDE 150 MG: 150 CAPSULE, EXTENDED RELEASE ORAL at 08:51

## 2018-07-31 RX ADMIN — HEPARIN SODIUM 5000 UNITS: 5000 INJECTION, SOLUTION INTRAVENOUS; SUBCUTANEOUS at 08:51

## 2018-07-31 RX ADMIN — HEPARIN SODIUM 5000 UNITS: 5000 INJECTION, SOLUTION INTRAVENOUS; SUBCUTANEOUS at 16:26

## 2018-07-31 RX ADMIN — PIPERACILLIN SODIUM,TAZOBACTAM SODIUM 3.38 G: 3; .375 INJECTION, POWDER, FOR SOLUTION INTRAVENOUS at 16:25

## 2018-07-31 RX ADMIN — SIMVASTATIN 20 MG: 20 TABLET, FILM COATED ORAL at 21:36

## 2018-07-31 RX ADMIN — Medication 10 ML: at 16:26

## 2018-07-31 RX ADMIN — IPRATROPIUM BROMIDE AND ALBUTEROL SULFATE 3 ML: .5; 3 SOLUTION RESPIRATORY (INHALATION) at 14:55

## 2018-07-31 RX ADMIN — Medication 10 ML: at 05:56

## 2018-07-31 RX ADMIN — FERROUS GLUCONATE TAB 324 MG (37.5 MG ELEMENTAL IRON) 1 TABLET: 324 (37.5 FE) TAB at 08:51

## 2018-07-31 RX ADMIN — PIPERACILLIN SODIUM,TAZOBACTAM SODIUM 3.38 G: 3; .375 INJECTION, POWDER, FOR SOLUTION INTRAVENOUS at 05:55

## 2018-07-31 RX ADMIN — LABETALOL HYDROCHLORIDE 300 MG: 100 TABLET, FILM COATED ORAL at 17:23

## 2018-07-31 RX ADMIN — LABETALOL HYDROCHLORIDE 300 MG: 100 TABLET, FILM COATED ORAL at 08:51

## 2018-07-31 RX ADMIN — Medication 10 ML: at 21:37

## 2018-07-31 RX ADMIN — PIPERACILLIN SODIUM,TAZOBACTAM SODIUM 3.38 G: 3; .375 INJECTION, POWDER, FOR SOLUTION INTRAVENOUS at 21:36

## 2018-07-31 NOTE — PROGRESS NOTES
INFECTIOUS DISEASE FOLLOW UP NOTE :-  
 
Admit Date: 7/28/2018 ABX:  
  
Current abx Prior abx Zosyn 7/28- 3 Vanco 7/28-3  
  
ASSESSMENT: -- RECOMMENDATIONS   
  
Sepsis POA 
- fever, leucocytosis, tachycardia, JOON 
- suspect sec to cellulitis of leg - fever improving - WBc down to 10k 
- Positive blcx for BHS-Gr G  
- plan to switch to oral Keflex when better to complete 10 days course - d/w medicine team  
BSi BHS 
- suspect legs 
- no obvious abscess - repeat blcx x2 IP 
- Continue Zosyn, will stop Vanco 
 
   
Cellulitis- RLE 
- in setting of ch lymphedema 
- difficult to evaluate if any underlying abscess - elevate leg 
- no open ulcer but dry patches and scabs- at risk for BSI 
- neg PVL for DVT 
- low threshold for CT JOON on CKD- 2.08 on admission - now 1.55 
- dose Abx for current crcl - Avoid nephrotoxic meds 
- monitor on combo- vanco+zosyn as more nephrotoxic Hematuria- no urinary pain, abd pain 
- no h/o stone - US with bladder thickening / cystitis but neg Ucx 
- consider  input if with persistent hematuria Ch anemia ? Sec to DUB ? hematuria    
Sarcoidosis    
DM2    
MO    
HTN    
  
MICROBIOLOGY:  
  
7/28               blcx x2 BHS 
7/30               ucx neg 
  
LINES/DRAINS:  
  
PIV MEDICATIONS:  
 
Current Facility-Administered Medications Medication Dose Route Frequency  vancomycin (VANCOCIN) 1,000 mg in 0.9% sodium chloride (MBP/ADV) 250 mL adv  1,000 mg IntraVENous Q12H  piperacillin-tazobactam (ZOSYN) 3.375 g in 0.9% sodium chloride (MBP/ADV) 100 mL MBP - ### EXTENDED 4 HOUR INFUSION###  3.375 g IntraVENous Q8H  
 VANCOMYCIN INFORMATION NOTE   Other ONCE  
 sodium chloride (NS) flush 5-10 mL  5-10 mL IntraVENous PRN  
 VANCOMYCIN INFORMATION NOTE 1 Each  1 Each Other Rx Dosing/Monitoring  acetaminophen (TYLENOL) tablet 650 mg  650 mg Oral Q6H PRN  
 cyclobenzaprine (FLEXERIL) tablet 5 mg  5 mg Oral TID PRN  
 ferrous gluconate 324 mg (37.5 mg iron) tablet 1 Tab  324 mg Oral ACB  labetalol (NORMODYNE) tablet 300 mg  300 mg Oral BID  simvastatin (ZOCOR) tablet 20 mg  20 mg Oral QHS  venlafaxine-SR (EFFEXOR-XR) capsule 150 mg  150 mg Oral DAILY  sodium chloride (NS) flush 5-10 mL  5-10 mL IntraVENous Q8H  
 sodium chloride (NS) flush 5-10 mL  5-10 mL IntraVENous PRN  
 HYDROcodone-acetaminophen (NORCO) 7.5-325 mg per tablet 1 Tab  1 Tab Oral Q6H PRN  
 heparin (porcine) injection 5,000 Units  5,000 Units SubCUTAneous Q8H  
 0.9% sodium chloride infusion  100 mL/hr IntraVENous CONTINUOUS  
 insulin lispro (HUMALOG) injection   SubCUTAneous AC&HS  hydrALAZINE (APRESOLINE) 20 mg/mL injection 10 mg  10 mg IntraVENous Q6H PRN  
 albuterol-ipratropium (DUO-NEB) 2.5 MG-0.5 MG/3 ML  3 mL Nebulization Q6H RT  
 
 
SUBJECTIVE :  
 
Interval notes reviewed. Afebrile. Legs remains swollen and red but slowly improving. Mary Alice Abx well. cx and imaging d/w her. Need to keep legs elevated. Repeat blcx drawn and if neg by tomorrow, can consider oral Abx. OBJECTIVE Visit Vitals  /78 (BP 1 Location: Right arm, BP Patient Position: At rest)  Pulse 76  Temp 99 °F (37.2 °C)  Resp 18  Ht 5' 5\" (1.651 m)  Wt 153.3 kg (338 lb)  SpO2 99%  BMI 56.25 kg/m2 Temp (24hrs), Av.9 °F (37.2 °C), Min:98 °F (36.7 °C), Max:100 °F (37.8 °C) General: Well-developed, MO, 46y.o. year-old, female, in no acute distress HEENT: Normocephalic, anicteric sclerae, No sinus tenderness. Neck: Supple, no lymphadenopathy, masses or thyromegaly Chest: Symmetrical expansion, CTA Heart: Regular rhythm, no murmurs, rubs or gallops, No JVD Abdomen: Soft, obese, non-tender,non distended, no organomegaly, BS+ Musculoskeletal: large legs, 3+ edema- ch, unable to define knees, Rt leg with warmth, redness - some improvement, unchanged dry scaly scabs, no open ulcer, tender.  Left leg with swelling without erythema and warmth. CNS: AAOx3. Cranial nerves II-XII intact. Grossly normal.No NR 
SKIN: No other skin lesion or rash. Dry, warm, intact   
  
 
Labs: Results:  
Chemistry Recent Labs  
   07/31/18 
 0350  07/30/18 0400  07/29/18 
 0446  07/28/18 
 1115 GLU  81  89  115*  90  
NA  143  139  140  135* K  3.6  3.3*  3.4*  3.7 CL  109*  106  105  100 CO2  24  25  25  26 BUN  15  19*  21*  16  
CREA  1.55*  1.61*  1.84*  2.07* CA  8.4*  8.3*  8.0*  8.1* AGAP  10  8  10  9 BUCR  10*  12  11*  8* AP   --   59  64  69 TP   --   6.5  6.7  7.9 ALB   --   2.4*  2.5*  2.8*  
GLOB   --   4.1*  4.2*  5.1* AGRAT   --   0.6*  0.6*  0.5* CBC w/Diff Recent Labs  
   07/31/18 0350 07/30/18 0400  07/29/18 
 0446 WBC  10.1  10.7  12.9 RBC  3.69*  3.75*  4.03* HGB  7.9*  8.0*  8.8* HCT  25.2*  25.7*  27.9*  
PLT  183  172  146 GRANS  75*  80*  92* LYMPH  12*  9*  3* EOS  3  1  0 RADIOLOGY :   
 
 
 
Imaging [place] [facility] [place]28/18 XR CHEST PORT Narrative Chest, single view Indication: Fever, chills, body aches Comparison: Chest radiograph 8/26/2017; CT chest 1/3/2018 Findings:  Portable upright AP view of the chest was obtained. The lungs mildly underexpanded. No focal pneumonic consolidation, pneumothorax 
or pleural effusion. Cardiac size and mediastinal contours are stable, with 
redemonstration of multiple calcified mediastinal and hilar lymph nodes, 
unchanged. No acute osseous abnormality. Impression IMPRESSION: 
1. Mildly underexpanded lungs, without focal pneumonic consolidation or pleural 
effusion. 2. Unchanged radiographic appearance to calcified mediastinal and hilar lymph 
nodes. Results from [facility] [place] on 01/02/18 CT CHEST WO CONT Narrative COMPARISON: Chest CT 8/26/2017.  
 
INDICATION: Follow-up pulmonary nodule and atypical infiltrate seen previously. TECHNIQUE: Axial was performed through the chest without contrast.  Coronal and 
sagittal reformations were generated. One or more dose reduction techniques were used on this CT: automated exposure 
control, adjustment of the mAs and/or kVp according to patient's size, and 
iterative reconstruction techniques. The specific techniques utilized on this CT 
exam have been documented in the patient's electronic medical record. 
 
============ 
 
LUNGS: Scattered mosaic attenuation is seen throughout the inferior aspect of 
right and left upper lobes, throughout the posterior aspects of the right middle 
lobe, and heterogeneously throughout left and right lower lobes. Subsolid, 
groundglass nodules seen within the lateral aspect of the right upper lobe 
(image 42), measuring 4 mm. Another 4 mm subpleural groundglass nodules seen 
within the right lower lobe superior segment (image 48). No solid pulmonary 
nodule within the aerated lung parenchyma. Majority of the faint centrilobular 
nodules seen on prior chest CT of 8/26/2017 have resolved. PLEURA: Normal, with no effusion or pneumothorax. AIRWAY: Unremarkable. MEDIASTINUM: There is asymmetric enlargement of the left thyroid lobe, without 
discrete nodularity on this unenhanced CT scan. Mild to moderate global 
cardiomegaly. Small pericardial effusion superficial to the right atrium. Moderate left anterior descending and left circumflex coronary artery 
atherosclerotic calcification. Prominent main pulmonary artery, measuring 3.7 
cm. Aneurysmal ascending thoracic aorta measuring 4.3 cm. Normal caliber 
transverse and descending thoracic aorta. LYMPH NODES: There are several, heterogeneously calcified subcarinal, 
paratracheal, and prevascular mediastinal lymph nodes. Mild background 
mediastinal relatively diffuse lymphadenopathy. Calcified left and right hilar 
lymph nodes are also present.  No axillary or supraclavicular lymphadenopathy. UPPER ABDOMEN: Large 2.9 cm lamellated gallstone is seen within the gallbladder 
fundus. Nonobstructive 4 mm calcification near the right renal hilum, probably 
vascular. Small hiatal hernia. No acute abnormality in the visualized upper 
abdomen. OTHER: No acute osseous abnormality. Mild multilevel degenerative changes of the 
thoracic spine. 
 
============ Impression IMPRESSION: 
 
1. Slight interval progression of mosaic attenuation throughout the lungs with 
small, probably infectious or inflammatory right upper and right lower lobe 
subsolid pulmonary nodules. Primary considerations remain stigmata of chronic 
small airway or small vessel disease. Prominent main pulmonary artery indicates 
underlying pulmonary arterial hypertension and would seem to favor a vascular 
etiology. No airway dilatation or bronchial wall thickening to indicate small 
airway disease. 2. Heterogeneously calcified mediastinal and hilar lymph nodes, likely related 
to patient's reported sarcoidosis. 3. Aneurysmal ascending thoracic aorta (4.3 cm). 4. Unchanged, asymmetric left thyroid lobe enlargement. This could be further 
evaluated with a nonemergent thyroid ultrasound. I have independently examined the patient and reviewed lab studies and imgaing as well as review of nursing notes and physican notes from the past 24 hours. Dragon medical dictation software was used for portions of this report. Unintended errors may occur. Cheri Zavala MD 
July 31, 2018 [facility] [phone]

## 2018-07-31 NOTE — ROUTINE PROCESS
1925  Bedside and Verbal shift change report given to Paradise Hidalgo (oncoming nurse) by Francesca Rubio  (offgoing nurse). Report included the following information SBAR, Kardex, Intake/Output and MAR. Pt awake and alert at this time. 8630  Bedside and Verbal shift change report given to Carly Ramirez (oncoming nurse) by Paradise Hidalgo (offgoing nurse). Report included the following information SBAR, Kardex, Intake/Output and MAR.

## 2018-07-31 NOTE — ROUTINE PROCESS
1945  Bedside and Verbal shift change report given to Rachel Platt (oncoming nurse) by Demetrius Larkin (offgoing nurse). Report included the following information SBAR, Kardex, Intake/Output and MAR. Pt awake and alert at this time. 2130  Shift assessment complete and due meds given. Pt c/o of headache 4/10, tylenol given. 0300  Reassessment complete. No change in pt condition 0725  Bedside and Verbal shift change report given to Leoncio Brewer (oncoming nurse) by Rachel Platt (offgoing nurse). Report included the following information SBAR, Kardex, Intake/Output and MAR.

## 2018-07-31 NOTE — ROUTINE PROCESS
46 - Assumed care of patient - alert and oriented. Ambulatory to BR with assist and transported via stretcher for BLE duplex studies. 0845 - back to room - eating breakfast.  AM meds administered. 1130 - Bedside and Verbal shift change report given to Niall Pardo RN (oncoming nurse) by Crystal Garibay RN (offgoing nurse). Report included the following information SBAR, Kardex and MAR.

## 2018-07-31 NOTE — PROGRESS NOTES
Problem: Falls - Risk of 
Goal: *Absence of Falls Document Redgie Curet Fall Risk and appropriate interventions in the flowsheet. Outcome: Progressing Towards Goal 
Fall Risk Interventions: 
Mobility Interventions: Patient to call before getting OOB Medication Interventions: Patient to call before getting OOB, Teach patient to arise slowly Elimination Interventions: Call light in reach, Patient to call for help with toileting needs Problem: Pressure Injury - Risk of 
Goal: *Prevention of pressure injury Document Aureliano Scale and appropriate interventions in the flowsheet. Outcome: Progressing Towards Goal 
Pressure Injury Interventions: 
Sensory Interventions: Assess changes in LOC, Keep linens dry and wrinkle-free, Pressure redistribution bed/mattress (bed type) Moisture Interventions: Absorbent underpads Activity Interventions: Pressure redistribution bed/mattress(bed type), Increase time out of bed Mobility Interventions: Pressure redistribution bed/mattress (bed type) Nutrition Interventions: Document food/fluid/supplement intake

## 2018-07-31 NOTE — INTERDISCIPLINARY ROUNDS
IDR Summary Patient: Antwan Goode MRN: [id number]    Age: 46 y.o.  : 1970 Admit Diagnosis: Sepsis ([address] Problems pertinent to hospital stay:  
 
Consults:Case Management Testing due for patient today? YES Nutrition plan:Yes Mobility needs: Yes Lines/Tubes:  
IV: YES   Needed: YES [place]: NO  Needed:NO Central Line: NO Needed: NO   
 
VTE Prophylaxis: Chemical 
 
 
Care Management: 
Discharge plan: home PCP: Uma Heck NP : NO Financial concerns:No  
Interventions: LOS: 3 days Expected days until discharge: TBD Signed: LAURYN Abdullahi 7 [facility] Pager:  [phone] Office:  [phone]

## 2018-07-31 NOTE — PROGRESS NOTES
[facility] In[facility] Daily Progress Note Daily progress Note Patient: Snow Barnhart MRN: [id number]  CSN: [id number] YOB: 1970  Age: 46 y.o. Sex: female DOA: 7/28/2018 LOS:  LOS: 3 days Chief Complaint:  Sepsis Interval History: 47 y/o [place] American female with hx of CKD Stage III, dysfunctional uterine bleeding, DM, HTN, anemia and morbid obesity, presented to the ED on 7/28 with generalized malaise, myalgia, fever, chills since the night prior. Pt had temp of 103.2 in the ED and tachycardia. WBC of 21.6, creatinine of 2.07 (comparedto 1.63 on 5/14/2018). Physical exam noted cellulitis of RLE. Lactic acid 1.7. UA with 2+ bacteria. She was started on IV abx and admitted for further management of care. Blood cultures collected 7/28 note aerobic and anaerobic bottles streptococci, beta hemolytic Group G x 2.  ID consulted, repeat blood cultures collected 7/30 pending. Retroperitoneal U/S 7/30 consistent with chronic medical renal disease, heterogeneous uterine mass w/ irregular shaped internal anechoic component most likely representing large necrotic fibroid related to pt's hx of uterine fibroid embolization, bladder thickened 6mm, cholelithiasis. PVL BLE 7/31 negative. Assessment/Plan:  
 
Patient Active Problem List  
Diagnosis Code  Chronic blood loss anemia D50.0  Acute blood loss anemia D62  
 Unspecified essential hypertension I10  Type 2 diabetes mellitus (HCC) E11.9  Sarcoidosis D86.9  CKD (chronic kidney disease) stage 3, GFR 30-59 ml/min N18.3  Acute on chronic renal failure (HCC) N17.9, N18.9  Fibroids D25.9  Depression F32.9  Morbid obesity ([address] E66.01  
 Sepsis ([address] A41.9  Cellulitis of right leg L03.115  
 JOON (acute kidney injury) ([facility] [address] [facility] N17.9  Chronic anemia D64.9 A/P: 
Sepsis 
- febrile, tachycardia on admission, lactic acid normal 
- Cellulitis RLE  
- blood cultures collected 7/28 aerobic and anaerobic bottles streptococci, beta hemolytic Group G x 2 
- on IV Zosyn per ID, plan to switch to oral Keflex when better to complete ten day course - ID following, appreciate input 
- repeat blood cultures 7/30 NGTD x 2 thus far 
- fever improving, WBC normal 
 
Cellulitis RLE 
- in setting of chronic lymphedema 
- PVL BLE 7/31 negative - IV abx per ID recommendations JOON on CKD Stage III  
- improving with IVF's 
- monitor BMP 
- Vancomycin stopped per ID (combo Vanc and Zosyn more nephrotoxic) Hematuria and bacteruria - UA on admission with 2+ bacteria 
- hx of dysfunctional uterine bleeding, fibroids 
- urine culture pending 
- CBC stable 
- retroperitoneal U/S 7/30 consistent with chronic medical renal disease, heterogeneous uterine mass w/ irregular shaped internal anechoic component most likely representing large necrotic fibroid related to pt's hx of uterine fibroid embolization, bladder thickened 6mm, cholelithiasis Chronic anemia 
- stable, continue to monitor Hypertension - Labetalol 300 mg BID Diabetes 
- monitor accuchecks, correctional SSI 
 
DVT Prophylaxis 
- Heparin subcutaneously TID ALIREZA Fuentes [facility] Pager:  [phone] Office:  [phone] Subjective: No events overnight. Mild pain/discomfort RLE. Objective:  
  
Visit Vitals  /78 (BP 1 Location: Right arm, BP Patient Position: At rest)  Pulse 76  Temp 99 °F (37.2 °C)  Resp 18  Ht 5' 5\" (1.651 m)  Wt 153.3 kg (338 lb)  SpO2 99%  BMI 56.25 kg/m2 Physical Exam: 
General appearance: alert, cooperative, no distress, morbidly obese AA female Head: Normocephalic, without obvious abnormality, atraumatic Lungs: clear to auscultation bilaterally Heart: regular rate and rhythm, S1, S2 normal, no murmur, click, rub or gallop Abdomen: soft, non tender, non distended. Normoactive bowel sounds. Extremities: chronic lymphedema BLE, mild erythema RLE, dry scaly patches anterior RLE Skin: Skin color, texture, turgor normal. No rashes or lesions Neurologic: Grossly normal 
PSY: Mood and affect normal, appropriately behaved Intake and Output: 
Current Shift:    
Last three shifts:  07/29 1901 - 07/31 0700 In: 8118 [P.O.:360; I.V.:4850] Out: 0 Recent Results (from the past 24 hour(s)) GLUCOSE, POC Collection Time: 07/30/18 11:26 AM  
Result Value Ref Range Glucose (POC) 97 70 - 110 mg/dL GLUCOSE, POC Collection Time: 07/30/18  4:29 PM  
Result Value Ref Range Glucose (POC) 98 70 - 110 mg/dL GLUCOSE, POC Collection Time: 07/30/18  9:05 PM  
Result Value Ref Range Glucose (POC) 127 (H) 70 - 110 mg/dL METABOLIC PANEL, BASIC Collection Time: 07/31/18  3:50 AM  
Result Value Ref Range Sodium 143 136 - 145 mmol/L Potassium 3.6 3.5 - 5.5 mmol/L Chloride 109 (H) 100 - 108 mmol/L  
 CO2 24 21 - 32 mmol/L Anion gap 10 3.0 - 18 mmol/L Glucose 81 74 - 99 mg/dL BUN 15 7.0 - 18 MG/DL Creatinine 1.55 (H) 0.6 - 1.3 MG/DL  
 BUN/Creatinine ratio 10 (L) 12 - 20 GFR est AA 43 (L) >60 ml/min/1.73m2 GFR est non-AA 35 (L) >60 ml/min/1.73m2 Calcium 8.4 (L) 8.5 - 10.1 MG/DL  
CBC WITH AUTOMATED DIFF Collection Time: 07/31/18  3:50 AM  
Result Value Ref Range WBC 10.1 4.6 - 13.2 K/uL  
 RBC 3.69 (L) 4.20 - 5.30 M/uL HGB 7.9 (L) 12.0 - 16.0 g/dL HCT 25.2 (L) 35.0 - 45.0 % MCV 68.3 (L) 74.0 - 97.0 FL  
 MCH 21.4 (L) 24.0 - 34.0 PG  
 MCHC 31.3 31.0 - 37.0 g/dL RDW 20.4 (H) 11.6 - 14.5 % PLATELET 821 118 - 081 K/uL NEUTROPHILS 75 (H) 40 - 73 % LYMPHOCYTES 12 (L) 21 - 52 % MONOCYTES 10 3 - 10 % EOSINOPHILS 3 0 - 5 % BASOPHILS 0 0 - 2 %  
 ABS. NEUTROPHILS 7.6 1.8 - 8.0 K/UL  
 ABS. LYMPHOCYTES 1.2 0.9 - 3.6 K/UL  
 ABS. MONOCYTES 1.1 0.05 - 1.2 K/UL  
 ABS.  EOSINOPHILS 0.3 0.0 - 0.4 K/UL  
 ABS. BASOPHILS 0.0 0.0 - 0.1 K/UL  
 DF AUTOMATED    
GLUCOSE, POC Collection Time: 07/31/18  7:40 AM  
Result Value Ref Range Glucose (POC) 87 70 - 110 mg/dL Lab Results Component Value Date/Time Glucose 81 07/31/2018 03:50 AM  
 Glucose 89 07/30/2018 04:00 AM  
 Glucose 115 (H) 07/29/2018 04:46 AM  
 Glucose 90 07/28/2018 11:15 AM  
 Glucose 82 05/14/2018 04:05 PM  
  
 
Imaging: 
Us Retroperitoneum Comp Result Date: 7/30/2018 Renal ultrasound History: Gross hematuria and bacteria; history of CKD III, uterine fibroids and dysfunctional uterine bleeding Comparison: Retroperitoneal ultrasound 9/2/2013, 2/11/2013. Pelvic ultrasound performed at an outside institution was reportedly performed on 1/4/2015 however, imaging or this report from that examination not available in the time of interpretation Technique: Multiple gray scale sonographic images of the kidneys and bladder were obtained. Additional color Doppler and the Doppler waveform images were obtained . Findings: Gallbladder: Hyperechoic lesion with posterior acoustic shadowing visualized in the neck of the gallbladder. Negative for increased gallbladder wall thickness nor pericholecystic fluid. The right kidney increased echogenicity and cortical thinning. No focal lesions are seen. The right kidney measures approximately 9.9 cm in length. There is no evidence of hydronephrosis. The left kidney increased echogenicity. No focal lesions are seen. The left kidney measures approximately 11.1 cm in length. There is no evidence of hydronephrosis. The bladder wall is thickened, and measures 6 mm. The estimated volume of the bladder pre-void is 153. 2. cc. The estimated postvoid residual is 13.7 cc. Bilateral ureter jets are visualized.  Uterus: A large heterogenous, mass with partially circumscribed and somewhat indistinct margins with a central mildly complex anechoic cavity with possible internal debris is seen extending from the uterus into the right adnexa and measuring 17.9 x 19.6 x 17.5 cm., Previous reported measurement of 14.5 x 13.1 x 13.8 cm a 1/4/2015 . Patient has a history of uterine fibroids, and reportedly had a uterine fibroid embolization on 3/5/2014. Survey images of the abdominal aorta, common iliac artery origins and IVC are unremarkable. IMPRESSION: 1. Bilateral increase in renal echogenicity, cortical thinning in the right kidney. Consistent with chronic medical renal disease. 2.Heterogenous uterine mass with irregular shaped internal anechoic component, most likely representing a large necrotic fibroid related to patient history of uterine fibroid embolization. 3. Bladder thickened with a measurement of 6mm. This may be due to under-distension of the bladder versus cystitis. Clinical correlation recommended. 4. Cholelithiasis. Negative for other signs of gallbladder disease. NOTE:  I have personally reviewed the images and the final attending interpretation is in concordance with the above resident report. Medication List Reviewed: 
Current Facility-Administered Medications Medication Dose Route Frequency  vancomycin (VANCOCIN) 1,000 mg in 0.9% sodium chloride (MBP/ADV) 250 mL adv  1,000 mg IntraVENous Q12H  piperacillin-tazobactam (ZOSYN) 3.375 g in 0.9% sodium chloride (MBP/ADV) 100 mL MBP - ### EXTENDED 4 HOUR INFUSION###  3.375 g IntraVENous Q8H  
 VANCOMYCIN INFORMATION NOTE   Other ONCE  
 sodium chloride (NS) flush 5-10 mL  5-10 mL IntraVENous PRN  
 VANCOMYCIN INFORMATION NOTE 1 Each  1 Each Other Rx Dosing/Monitoring  acetaminophen (TYLENOL) tablet 650 mg  650 mg Oral Q6H PRN  
 cyclobenzaprine (FLEXERIL) tablet 5 mg  5 mg Oral TID PRN  
 ferrous gluconate 324 mg (37.5 mg iron) tablet 1 Tab  324 mg Oral ACB  labetalol (NORMODYNE) tablet 300 mg  300 mg Oral BID  simvastatin (ZOCOR) tablet 20 mg  20 mg Oral QHS  venlafaxine-SR (EFFEXOR-XR) capsule 150 mg  150 mg Oral DAILY  sodium chloride (NS) flush 5-10 mL  5-10 mL IntraVENous Q8H  
 sodium chloride (NS) flush 5-10 mL  5-10 mL IntraVENous PRN  
 HYDROcodone-acetaminophen (NORCO) 7.5-325 mg per tablet 1 Tab  1 Tab Oral Q6H PRN  
 heparin (porcine) injection 5,000 Units  5,000 Units SubCUTAneous Q8H  
 0.9% sodium chloride infusion  100 mL/hr IntraVENous CONTINUOUS  
 insulin lispro (HUMALOG) injection   SubCUTAneous AC&HS  hydrALAZINE (APRESOLINE) 20 mg/mL injection 10 mg  10 mg IntraVENous Q6H PRN  
 albuterol-ipratropium (DUO-NEB) 2.5 MG-0.5 MG/3 ML  3 mL Nebulization Q6H RT

## 2018-08-01 VITALS
DIASTOLIC BLOOD PRESSURE: 94 MMHG | TEMPERATURE: 98.7 F | WEIGHT: 293 LBS | BODY MASS INDEX: 48.82 KG/M2 | HEART RATE: 72 BPM | OXYGEN SATURATION: 96 % | RESPIRATION RATE: 18 BRPM | SYSTOLIC BLOOD PRESSURE: 166 MMHG | HEIGHT: 65 IN

## 2018-08-01 LAB
GLUCOSE BLD STRIP.AUTO-MCNC: 101 MG/DL (ref 70–110)
GLUCOSE BLD STRIP.AUTO-MCNC: 84 MG/DL (ref 70–110)

## 2018-08-01 PROCEDURE — 74011250637 HC RX REV CODE- 250/637: Performed by: HOSPITALIST

## 2018-08-01 PROCEDURE — 74011000258 HC RX REV CODE- 258: Performed by: INTERNAL MEDICINE

## 2018-08-01 PROCEDURE — 74011000250 HC RX REV CODE- 250: Performed by: INTERNAL MEDICINE

## 2018-08-01 PROCEDURE — 94640 AIRWAY INHALATION TREATMENT: CPT

## 2018-08-01 PROCEDURE — 74011250636 HC RX REV CODE- 250/636: Performed by: INTERNAL MEDICINE

## 2018-08-01 PROCEDURE — 82962 GLUCOSE BLOOD TEST: CPT

## 2018-08-01 PROCEDURE — 74011250637 HC RX REV CODE- 250/637: Performed by: INTERNAL MEDICINE

## 2018-08-01 PROCEDURE — 94760 N-INVAS EAR/PLS OXIMETRY 1: CPT

## 2018-08-01 RX ORDER — CEPHALEXIN 500 MG/1
500 CAPSULE ORAL 2 TIMES DAILY
Qty: 20 CAP | Refills: 0 | Status: SHIPPED | OUTPATIENT
Start: 2018-08-01 | End: 2018-08-11

## 2018-08-01 RX ORDER — HYDROCODONE BITARTRATE AND ACETAMINOPHEN 7.5; 325 MG/1; MG/1
1 TABLET ORAL
Qty: 12 TAB | Refills: 0 | Status: SHIPPED | OUTPATIENT
Start: 2018-08-01 | End: 2018-10-03

## 2018-08-01 RX ORDER — CEPHALEXIN 500 MG/1
500 CAPSULE ORAL 2 TIMES DAILY
Qty: 12 CAP | Refills: 0 | Status: SHIPPED | OUTPATIENT
Start: 2018-08-01 | End: 2018-08-01

## 2018-08-01 RX ADMIN — LABETALOL HYDROCHLORIDE 300 MG: 100 TABLET, FILM COATED ORAL at 09:29

## 2018-08-01 RX ADMIN — Medication 10 ML: at 05:44

## 2018-08-01 RX ADMIN — IPRATROPIUM BROMIDE AND ALBUTEROL SULFATE 3 ML: .5; 3 SOLUTION RESPIRATORY (INHALATION) at 02:55

## 2018-08-01 RX ADMIN — PIPERACILLIN SODIUM,TAZOBACTAM SODIUM 3.38 G: 3; .375 INJECTION, POWDER, FOR SOLUTION INTRAVENOUS at 05:43

## 2018-08-01 RX ADMIN — HEPARIN SODIUM 5000 UNITS: 5000 INJECTION, SOLUTION INTRAVENOUS; SUBCUTANEOUS at 09:29

## 2018-08-01 RX ADMIN — ACYCLOVIR 400 MG: 200 CAPSULE ORAL at 09:29

## 2018-08-01 RX ADMIN — FERROUS GLUCONATE TAB 324 MG (37.5 MG ELEMENTAL IRON) 1 TABLET: 324 (37.5 FE) TAB at 09:29

## 2018-08-01 RX ADMIN — IPRATROPIUM BROMIDE AND ALBUTEROL SULFATE 3 ML: .5; 3 SOLUTION RESPIRATORY (INHALATION) at 07:19

## 2018-08-01 RX ADMIN — VENLAFAXINE HYDROCHLORIDE 150 MG: 150 CAPSULE, EXTENDED RELEASE ORAL at 09:29

## 2018-08-01 NOTE — DISCHARGE INSTRUCTIONS
Patient armband removed and shredded    DISCHARGE SUMMARY from Nurse    PATIENT INSTRUCTIONS:    After general anesthesia or intravenous sedation, for 24 hours or while taking prescription Narcotics:  · Limit your activities  · Do not drive and operate hazardous machinery  · Do not make important personal or business decisions  · Do  not drink alcoholic beverages  · If you have not urinated within 8 hours after discharge, please contact your surgeon on call. Report the following to your surgeon:  · Excessive pain, swelling, redness or odor of or around the surgical area  · Temperature over 100.5  · Nausea and vomiting lasting longer than 4 hours or if unable to take medications  · Any signs of decreased circulation or nerve impairment to extremity: change in color, persistent  numbness, tingling, coldness or increase pain  · Any questions    What to do at Home:  Recommended activity: Activity as tolerated    If you experience any of the following symptoms increased swelling or redness, fever greater than 100.5, chest pain, shortness of breath, dizziness, fatigue, please follow up with your primary care provider or call 911. *  Please give a list of your current medications to your Primary Care Provider. *  Please update this list whenever your medications are discontinued, doses are      changed, or new medications (including over-the-counter products) are added. *  Please carry medication information at all times in case of emergency situations. These are general instructions for a healthy lifestyle:    No smoking/ No tobacco products/ Avoid exposure to second hand smoke  Surgeon General's Warning:  Quitting smoking now greatly reduces serious risk to your health.     Obesity, smoking, and sedentary lifestyle greatly increases your risk for illness    A healthy diet, regular physical exercise & weight monitoring are important for maintaining a healthy lifestyle    You may be retaining fluid if you have a history of heart failure or if you experience any of the following symptoms:  Weight gain of 3 pounds or more overnight or 5 pounds in a week, increased swelling in our hands or feet or shortness of breath while lying flat in bed. Please call your doctor as soon as you notice any of these symptoms; do not wait until your next office visit. Recognize signs and symptoms of STROKE:    F-face looks uneven    A-arms unable to move or move unevenly    S-speech slurred or non-existent    T-time-call 911 as soon as signs and symptoms begin-DO NOT go       Back to bed or wait to see if you get better-TIME IS BRAIN. Warning Signs of HEART ATTACK     Call 911 if you have these symptoms:   Chest discomfort. Most heart attacks involve discomfort in the center of the chest that lasts more than a few minutes, or that goes away and comes back. It can feel like uncomfortable pressure, squeezing, fullness, or pain.  Discomfort in other areas of the upper body. Symptoms can include pain or discomfort in one or both arms, the back, neck, jaw, or stomach.  Shortness of breath with or without chest discomfort.  Other signs may include breaking out in a cold sweat, nausea, or lightheadedness. Don't wait more than five minutes to call 911 - MINUTES MATTER! Fast action can save your life. Calling 911 is almost always the fastest way to get lifesaving treatment. Emergency Medical Services staff can begin treatment when they arrive -- up to an hour sooner than if someone gets to the hospital by car. The discharge information has been reviewed with the patient. The patient verbalized understanding. Discharge medications reviewed with the patient and appropriate educational materials and side effects teaching were provided.   ___________________________________________________________________________________________________________________________________

## 2018-08-01 NOTE — PROGRESS NOTES
Problem: Falls - Risk of 
Goal: *Absence of Falls Document Rony Lopez Fall Risk and appropriate interventions in the flowsheet. Outcome: Progressing Towards Goal 
Fall Risk Interventions: 
Mobility Interventions: PT Consult for mobility concerns, Patient to call before getting OOB Medication Interventions: Patient to call before getting OOB, Teach patient to arise slowly Elimination Interventions: Call light in reach, Patient to call for help with toileting needs Problem: Pressure Injury - Risk of 
Goal: *Prevention of pressure injury Document Aureliano Scale and appropriate interventions in the flowsheet. Outcome: Progressing Towards Goal 
Pressure Injury Interventions: 
Sensory Interventions: Pressure redistribution bed/mattress (bed type) Moisture Interventions: Absorbent underpads Activity Interventions: Pressure redistribution bed/mattress(bed type) Mobility Interventions: Pressure redistribution bed/mattress (bed type) Nutrition Interventions: Document food/fluid/supplement intake

## 2018-08-01 NOTE — ROUTINE PROCESS
0- Assumed care. Pt is awake getting breathing treatment. NAD. 7280- Shift assessment completed. Pt is alert and oriented x 4. No c/o pain. No respiratory distress noted. Call light in reach. 0930- Due meds given. Tolerated well. 1230- Pt discharged home.

## 2018-08-01 NOTE — PROGRESS NOTES
INFECTIOUS DISEASE FOLLOW UP NOTE :-  
 
Admit Date: 7/28/2018 Late entry : seen at 12.30 pm  
 
ABX:  
  
Current abx Prior abx Zosyn 7/28- 4 Vanco 7/28-3  
  
ASSESSMENT: -- RECOMMENDATIONS   
  
Sepsis POA 
- fever, leucocytosis, tachycardia, JOON 
- suspect sec to cellulitis of leg - fever and leukocytosis resolved - Positive blcx for BHS-Gr G  
- switch to oral Keflex to complete 10 days course - d/w medicine team  
BSi BHS 
- suspect legs 
- no obvious abscess - repeat blcx x2 ntd - abx as above  
 
   
Cellulitis- RLE 
- in setting of ch lymphedema 
- difficult to evaluate if any underlying abscess 
- neg PVL - elevate leg 
- neg PVL for DVT JOON on CKD- 2.08 on admission - now 1.55 
- Avoid nephrotoxic meds Hematuria- no urinary pain, abd pain 
- no h/o stone - US with bladder thickening / cystitis but neg Ucx Ch anemia ? Sec to DUB ? hematuria    
Sarcoidosis    
DM2    
MO    
HTN    
  
MICROBIOLOGY:  
  
7/28               blcx x2 BHS 
7/30               ucx neg 
  
LINES/DRAINS:  
  
PIV MEDICATIONS:  
 
Current Facility-Administered Medications Medication Dose Route Frequency  acyclovir (ZOVIRAX) capsule 400 mg  400 mg Oral DAILY  piperacillin-tazobactam (ZOSYN) 3.375 g in 0.9% sodium chloride (MBP/ADV) 100 mL MBP - ### EXTENDED 4 HOUR INFUSION###  3.375 g IntraVENous Q8H  
 sodium chloride (NS) flush 5-10 mL  5-10 mL IntraVENous PRN  
 acetaminophen (TYLENOL) tablet 650 mg  650 mg Oral Q6H PRN  
 cyclobenzaprine (FLEXERIL) tablet 5 mg  5 mg Oral TID PRN  
 ferrous gluconate 324 mg (37.5 mg iron) tablet 1 Tab  324 mg Oral ACB  labetalol (NORMODYNE) tablet 300 mg  300 mg Oral BID  simvastatin (ZOCOR) tablet 20 mg  20 mg Oral QHS  venlafaxine-SR (EFFEXOR-XR) capsule 150 mg  150 mg Oral DAILY  sodium chloride (NS) flush 5-10 mL  5-10 mL IntraVENous Q8H  
 sodium chloride (NS) flush 5-10 mL  5-10 mL IntraVENous PRN  
 HYDROcodone-acetaminophen (NORCO) 7.5-325 mg per tablet 1 Tab  1 Tab Oral Q6H PRN  
 heparin (porcine) injection 5,000 Units  5,000 Units SubCUTAneous Q8H  
 0.9% sodium chloride infusion  100 mL/hr IntraVENous CONTINUOUS  
 insulin lispro (HUMALOG) injection   SubCUTAneous AC&HS  hydrALAZINE (APRESOLINE) 20 mg/mL injection 10 mg  10 mg IntraVENous Q6H PRN  
 albuterol-ipratropium (DUO-NEB) 2.5 MG-0.5 MG/3 ML  3 mL Nebulization Q6H RT  
 
Current Outpatient Prescriptions Medication Sig Dispense  HYDROcodone-acetaminophen (NORCO) 7.5-325 mg per tablet Take 1 Tab by mouth every six (6) hours as needed. Max Daily Amount: 4 Tabs. 12 Tab  cephALEXin (KEFLEX) 500 mg capsule Take 1 Cap by mouth two (2) times a day for 10 days. 20 Cap  acetaminophen (TYLENOL) 325 mg tablet Take 2 Tabs by mouth every four (4) hours as needed for Pain. 20 Tab  cyclobenzaprine (FLEXERIL) 5 mg tablet Take 1 Tab by mouth three (3) times daily as needed for Muscle Spasm(s). 12 Tab  budesonide-formoterol (SYMBICORT) 160-4.5 mcg/actuation HFAA Take 2 Puffs by inhalation two (2) times a day. 1 Inhaler  MULTIVITAMIN PO Take  by mouth.  acyclovir (ZOVIRAX) 400 mg tablet Take 400 mg by mouth daily.  ferrous sulfate 324 mg (65 mg iron) tablet Take 324 mg by mouth Daily (before breakfast).  venlafaxine-SR (EFFEXOR-XR) 150 mg capsule Take  by mouth daily.  labetalol (NORMODYNE) 200 mg tablet Take 300 mg by mouth two (2) times a day.  simvastatin (ZOCOR) 20 mg tablet Take 20 mg by mouth nightly. SUBJECTIVE :  
 
Interval notes reviewed. Afebrile. Legs remains swollen but much improved, family at bedside to take patient home, d./w pt and family importance of leg elevation, compression stockings and likely going to lymphedema clinic to prevent future infection and manage fluid better. OBJECTIVE Visit Vitals  BP (!) 166/94 (BP 1 Location: Right arm, BP Patient Position:  At rest)  Pulse 72  Temp 98.7 °F (37.1 °C)  Resp 18  Ht 5' 5\" (1.651 m)  Wt 153.3 kg (338 lb)  SpO2 96%  BMI 56.25 kg/m2 Temp (24hrs), Av °F (37.2 °C), Min:98.7 °F (37.1 °C), Max:99.4 °F (37.4 °C) General: Well-developed, MO, 46y.o. year-old, female, in no acute distress HEENT: Normocephalic, anicteric sclerae, No sinus tenderness. Neck: Supple, no lymphadenopathy, masses or thyromegaly Chest: Symmetrical expansion, CTA Heart: Regular rhythm, no murmurs, rubs or gallops, No JVD Abdomen: Soft, obese, non-tender,non distended, no organomegaly, BS+ Musculoskeletal: large legs, 3+ edema- ch, unable to define knees, Rt leg with warmth, redness much improved, unchanged dry scaly scabs, no open ulcer, tender. Left leg with swelling without erythema and warmth. CNS: AAOx3. Cranial nerves II-XII intact. Grossly normal.No NR 
SKIN: No other skin lesion or rash. Dry, warm, intact   
  
 
Labs: Results:  
Chemistry Recent Labs  
   18 
 0350  18 
 0400 GLU  81  89 NA  143  139  
K  3.6  3.3*  
CL  109*  106 CO2  24  25 BUN  15  19* CREA  1.55*  1.61* CA  8.4*  8.3* AGAP  10  8 BUCR  10*  12  
AP   --   59  
TP   --   6.5 ALB   --   2.4*  
GLOB   --   4.1* AGRAT   --   0.6* CBC w/Diff Recent Labs  
   18 
 0350  18 
 0400 WBC  10.1  10.7 RBC  3.69*  3.75* HGB  7.9*  8.0*  
HCT  25.2*  25.7*  
PLT  183  172 GRANS  75*  80* LYMPH  12*  9* EOS  3  1 RADIOLOGY :   
 
 
 
[place] [facility] [place] 18 XR CHEST PORT Narrative Chest, single view Indication: Fever, chills, body aches Comparison: Chest radiograph 2017; CT chest 1/3/2018 Findings:  Portable upright AP view of the chest was obtained. The lungs mildly underexpanded. No focal pneumonic consolidation, pneumothorax 
or pleural effusion.  Cardiac size and mediastinal contours are stable, with 
redemonstration of multiple calcified mediastinal and hilar lymph nodes, 
unchanged. No acute osseous abnormality. Impression IMPRESSION: 
1. Mildly underexpanded lungs, without focal pneumonic consolidation or pleural 
effusion. 2. Unchanged radiographic appearance to calcified mediastinal and hilar lymph 
nodes. Results from [facility] Encounter encounter on 01/02/18 CT CHEST WO CONT Narrative COMPARISON: Chest CT 8/26/2017. INDICATION: Follow-up pulmonary nodule and atypical infiltrate seen previously. TECHNIQUE: Axial was performed through the chest without contrast.  Coronal and 
sagittal reformations were generated. One or more dose reduction techniques were used on this CT: automated exposure 
control, adjustment of the mAs and/or kVp according to patient's size, and 
iterative reconstruction techniques. The specific techniques utilized on this CT 
exam have been documented in the patient's electronic medical record. 
 
============ 
 
LUNGS: Scattered mosaic attenuation is seen throughout the inferior aspect of 
right and left upper lobes, throughout the posterior aspects of the right middle 
lobe, and heterogeneously throughout left and right lower lobes. Subsolid, 
groundglass nodules seen within the lateral aspect of the right upper lobe 
(image 42), measuring 4 mm. Another 4 mm subpleural groundglass nodules seen 
within the right lower lobe superior segment (image 48). No solid pulmonary 
nodule within the aerated lung parenchyma. Majority of the faint centrilobular 
nodules seen on prior chest CT of 8/26/2017 have resolved. PLEURA: Normal, with no effusion or pneumothorax. AIRWAY: Unremarkable. MEDIASTINUM: There is asymmetric enlargement of the left thyroid lobe, without 
discrete nodularity on this unenhanced CT scan. Mild to moderate global 
cardiomegaly. Small pericardial effusion superficial to the right atrium.  
Moderate left anterior descending and left circumflex coronary artery 
atherosclerotic calcification. Prominent main pulmonary artery, measuring 3.7 
cm. Aneurysmal ascending thoracic aorta measuring 4.3 cm. Normal caliber 
transverse and descending thoracic aorta. LYMPH NODES: There are several, heterogeneously calcified subcarinal, 
paratracheal, and prevascular mediastinal lymph nodes. Mild background 
mediastinal relatively diffuse lymphadenopathy. Calcified left and right hilar 
lymph nodes are also present. No axillary or supraclavicular lymphadenopathy. UPPER ABDOMEN: Large 2.9 cm lamellated gallstone is seen within the gallbladder 
fundus. Nonobstructive 4 mm calcification near the right renal hilum, probably 
vascular. Small hiatal hernia. No acute abnormality in the visualized upper 
abdomen. OTHER: No acute osseous abnormality. Mild multilevel degenerative changes of the 
thoracic spine. 
 
============ Impression IMPRESSION: 
 
1. Slight interval progression of mosaic attenuation throughout the lungs with 
small, probably infectious or inflammatory right upper and right lower lobe 
subsolid pulmonary nodules. Primary considerations remain stigmata of chronic 
small airway or small vessel disease. Prominent main pulmonary artery indicates 
underlying pulmonary arterial hypertension and would seem to favor a vascular 
etiology. No airway dilatation or bronchial wall thickening to indicate small 
airway disease. 2. Heterogeneously calcified mediastinal and hilar lymph nodes, likely related 
to patient's reported sarcoidosis. 3. Aneurysmal ascending thoracic aorta (4.3 cm). 4. Unchanged, asymmetric left thyroid lobe enlargement. This could be further 
evaluated with a nonemergent thyroid ultrasound. I have independently examined the patient and reviewed lab studies and imgaing as well as review of nursing notes and physican notes from the past 24 hours. Carolene Opitz, MD 
August 1, 2018 [facility] [phone]

## 2018-08-02 NOTE — ADT AUTH CERT NOTES
and  
 
 
  Patient Demographics     
  Patient Name [address] Sex  Address Phone    
Amie Herman [phone] Female 1970 [id number] [facility] [address] [phone] (Home) [phone] (Mobile) *Preferred*    
   
  CSN:    
  [id number]    
   
  Admit Date: Admit Time Room Bed    
  2018 11:03 AM 3004 [[id number]] 01 [[id number]]    
   
  Attending Providers     
  Provider Pager From To Francesco Raza 14, DO  18    
  Dejon Kathia, DO  18    
  Richa Reynoso MD  18    
  Wyn Mortimer, MD  18    
   
  Emergency Contact(s)     
  Name Relation Home Work Mobile    
Hina Fried Child [phone]  [phone]    
   
Utilization Review  
  
  
  Cellulitis - Care Day 4 (2018) by Rosita Castellon     
  Review Status Review Entered    
  Completed 2018    
  Details    
      
  Care Day: 4 Care Date: 2018 Level of Care: Telemetry    
  Guideline Day 3     
  Clinical Status    
  (X) * Hemodynamic stability    
  2018 1:12 PM EDT by Shakila Or    
    98.8  °F (37.1  °C) 84 . 147/93  -- Head of bed elevated  R 18 sat 97 % -ra-    
      
  (X) * Fever absent or improved    
  (X) * Skin exam stable or improved    
  2018 1:12 PM EDT by Shakila Or    
    Musculoskeletal: large legs, 3+ edema- ch, unable to define knees, Rt leg with warmth, redness - some improvement, unchanged dry scaly scabs, no open ulcer, tender.  Left leg with swelling    
      
  (X) * Mental status at baseline    
  ( ) * Antibiotic treatment needs appropriate for next level of care    
  (X) * Pain absent or manageable at next level of care    
  ( ) * Discharge plans and education understood    
      
  Activity    
  (X) * Ambulatory    
  2018 1:12 PM EDT by Shakila Or    
    amb to restroom    
      
      
  Routes    
  (X) * Oral hydration, medications, [F] and diet    
  2018 1:12 PM EDT by Nicol Trimble    
    iv and po    
      
      
  Interventions    
  (X) WBC    
  8/1/2018 1:12 PM EDT by Nicol Trimble    
    wbc 10.1, hh 7.9/ 25.2. Neut 75, lymph 12. K 3.6, cr 1.55    
      
      
  Medications    
  (X) Parenteral or oral antibiotics [B]    
  8/1/2018 1:12 PM EDT by Nicol Trimble    
    ZOSYN IV Q 8H, VANC IV x 1  ,  ns IV @ 100, duonebs x 2 today, fe po , hep sc q 8h, Labetalol po bid    
      
      
  8/1/2018 1:12 PM EDT by Nicol Trimble    
  Subject: Additional Clinical Information    
   
    
  BLE duplex ; no DVT.     
   
    
      
      
      
      
  * Milestone    
      
  Additional Notes    
  ---------------MEDICINE; A/P:    
  Sepsis    
  - febrile, tachycardia on admission, lactic acid normal    
  - Cellulitis RLE     
  - blood cultures collected 7/28 aerobic and anaerobic bottles streptococci, beta hemolytic Group G x 2    
  - on IV Zosyn per ID, plan to switch to oral Keflex when better to complete ten day course    
  - ID following, appreciate input    
  - repeat blood cultures 7/30 NGTD x 2 thus far    
  - fever improving, WBC normal    
       
  Cellulitis RLE- in setting of chronic lymphedema    
  - PVL BLE 7/31 negative- IV abx per ID recommendations    
       
  JOON on CKD Stage III - improving with IVF's    
  - monitor BMP- Vancomycin stopped per ID (combo Vanc and Zosyn more nephrotoxic)    
       
  Hematuria and bacteruria    
  - UA on admission with 2+ bacteria    
  - hx of dysfunctional uterine bleeding, fibroids    
  - urine culture pending    
  - CBC stable    
  - retroperitoneal U/S 7/30 consistent with chronic medical renal disease, heterogeneous uterine mass w/ irregular shaped internal anechoic component most likely representing large necrotic fibroid related to pt's hx of uterine fibroid embolization, bladder thickened 6mm, cholelithiasis    
       
  Chronic anemia- stable, continue to monitor.     
  Hypertension- Labetalol 300 mg BID.      
  Diabetes- monitor accuchecks, correctional SSI.     
  DVT Prophylaxis- Heparin subcutaneously TID.      
      
  --------------------ID    
  Sepsis POA    
  - fever, leucocytosis, tachycardia, JOON    
  - suspect sec to cellulitis of leg - fever improving    
  - WBc down to 10k    
  - Positive blcx for BHS-Gr G     
  - plan to switch to oral Keflex when better to complete 10 days course    
  - d/w medicine team    
  BSi BHS    
  - suspect legs    
  - no obvious abscess - repeat blcx x2 IP    
  - Continue Zosyn, will stop Vanco     
        
  Cellulitis- RLE    
  - in setting of ch lymphedema    
  - difficult to evaluate if any underlying abscess - elevate leg    
  - no open ulcer but dry patches and scabs- at risk for BSI    
  - neg PVL for DVT    
  - low threshold for CT    
  JOON on CKD- 2.08 on admission - now 1.55    
  - dose Abx for current crcl    
  - Avoid nephrotoxic meds    
  - monitor on combo- vanco+zosyn as more nephrotoxic    
  Hematuria- no urinary pain, abd pain    
  - no h/o stone - US with bladder thickening / cystitis but neg Ucx    
  - consider  input if with persistent hematuria    
   
  Cellulitis - Care Day 3 (7/30/2018) by Kristina Alford     
  Review Status Review Entered    
  Completed 8/1/2018    
  Details    
      
  Care Day: 3 Care Date: 7/30/2018 Level of Care: Telemetry    
  Guideline Day 2     
  Level Of Care    
  (X) Floor    
      
  Clinical Status    
  (X) * Dehydration absent    
  (X) * Mental status at baseline    
  (X) * Hemodynamic stability    
  8/1/2018 12:58 PM EDT by Katiana Pak    
    98.4  °F (36.9  °C) 86 . 138/86 At rest r 18 sat  96 % ra    
      
  (X) * Fever absent or improved    
      
  Activity    
  (X) Activity as tolerated    
      
  Routes    
  (X) * Oral hydration, medications    
  8/1/2018 12:58 PM EDT by Katiana Pak    
    IVF and po    
      
  (X) Diet as tolerated    
  8/1/2018 12:58 PM EDT by Nicol Trimble    
    reg, no cons sweets    
      
      
  Interventions    
  (X) WBC    
  8/1/2018 12:58 PM EDT by Nicol Trimble    
    wbc 10.7, hh 8/ 25.7, neut 80, lymph 9. ANC 8.6.    
      
      
  Medications    
  (X) IV antibiotics    
  8/1/2018 12:58 PM EDT by Nicol Trimble    
    meds;  ZOSYN iv q 8h, VANC iv q 12h ,  NS iv @ 100, duoneb q 6h    , fe po , hep sc q 8h 5000 units, labetalol po bid, kcl po .    
      
      
  8/1/2018 12:58 PM EDT by Nicol Trimble    
  Subject: Additional Clinical Information    
   
    
  7/28 blood cult results 7/30; anerob and aerob bottles Streptococci Beta Hemolytic Gp G. final.... New blood cult sent 7/30.    
   
    
  RETRIPERIT US; 1.   Bilateral increase in renal echogenicity, cortical thinning in the rightkidney. Consistent with chronic medical renal disease.  2.Heterogenous uterine mass with irregular shaped internal anechoic component,most likely representing a large necrotic fibroid related to patient history ofuterine fibroid embolization.     3. Bladder thickened with a measurement of 6mm. This may be due tounder-distension of the bladder versus cystitis. Clinical correlationrecommended.  4. Cholelithiasis. Negative for other signs of gallbladder disease.        
   
    
  HISTORY-hx of CKD Stage III, dysfunctional uterine bleeding, DM, HTN, anemia and morbid obesity, presented to the ED on 7/28 with generalized malaise, myalgia, fever, chills since the night prior. Pt had temp of 103.2 in the ED and tachycardia. WBC of 21.6, creatinine of 2.07 (comparedto 1.63 on 5/14/2018). Physical exam noted cellulitis of RLE. Lactic acid 1.7. UA with 2+ bacteria. She was started on IV abx and admitted for further management of care.  Blood cultures collected 7/28 note aerobic and anaerobic bottles streptococci, beta hemolytic Group G x 2    
   
    
      
      
      
      
  * Milestone    
      
  Additional Notes    
  -------------Per MEDICINE; A/P:    
  Sepsis    
  - febrile, tachycardia on admission, lactic acid normal    
  - Cellulitis RLE     
  - blood cultures collected 7/28 aerobic and anaerobic bottles streptococci, beta hemolytic Group G x 2    
  - on IV Vancomycin and Zosyn    
  - ID consulted today     
  - repeat blood cultures pending    
  - fever improving, WBC normal    
       
  Cellulitis RLE    
  - in setting of chronic lymphedema- PVL pending    
       
  JOON on CKD Stage III - improving with IVF's    
  - monitor BMP    
       
  Hematuria and bacteruria    
  - UA on admission with 2+ bacteria    
  - hx of dysfunctional uterine bleeding, fibroids    
  - urine culture pending    
  - CBC stable    
  - retroperitoneal U/S 7/30 pending.     
  Chronic anemia- stable, continue to monitor.     
  Hypertension- Labetalol 300 mg BID.      
  Diabetes    
  - monitor accuchecks, correctional SSI.     
  DVT Prophylaxis- Heparin subcutaneously TID    
   --------------------------PER ID    
  Sepsis POA    
  - fever, leucocytosis, tachycardia, JOON    
  - suspect sec to cellulitis of leg - fever improving    
  - WBc down to 10k    
  - Positive blcx for BHS- should be covered by current Abx    
  - finalize Abx in am based on further results    
  - d/w medicine team    
  BSi BHS    
  - suspect legs    
  - no obvious abscess - repeat blcx x2     
  - await final ID and sensitivity    
  - Abx as above    
       
  Cellulitis- RLE    
  - in setting of ch lymphedema    
  - difficult to evaluate if any underlying abscess - * elevate leg*    
  - no open ulcer but dry patches and scabs- at risk for BSI    
  - check PVL    
  - low threshold for CT    
  JOON on CKD- 2.08 on admission - now 1.61    
  - dose Abx for current crcl    
  - Avoid nephrotoxic meds    
  - monitor on combo- vanco+zosyn as more nephrotoxic    
  Hematuria- no urinary pain, abd pain    
  - no h/o stone - plan for US to evaluate further

## 2018-08-05 LAB
BACTERIA SPEC CULT: NORMAL
BACTERIA SPEC CULT: NORMAL
SERVICE CMNT-IMP: NORMAL
SERVICE CMNT-IMP: NORMAL

## 2018-08-06 NOTE — ANCILLARY DISCHARGE INSTRUCTIONS
[employer]  Discharge Phone Call       After-Care Discharge Phone Call Questions:    Were you able to get your prescriptions filled? Comment:      [x] Yes  []No    Comment if answer is \"No\"   Are you taking your medication(s) as your doctor ordered? Do you understand the purpose of your medications? Comment:    [x] Yes  []No    Comment if answer is \"No\"   Are you taking any other medications that are not on the list?  Comment:      [] Yes  [x]No    Comment if answer is \"Yes\"   Do you have any questions about your medications? No  Are you aware of potential side effects? Yes   Comment:    [] Yes  [x]No    Comment if answer is \"Yes\"   Did you make your follow-up appointments (if the hospital did not do this before  Discharge)? Comment: \"daughter went into labor early and they are trying to stop it\"    [] Yes  [x]No    Comment if answer is \"No\"   Is there any reason you might not be able to keep your follow-up appointments? Comment:     [] Yes  []No    Comment if answer is \"Yes\"   Do you have any questions about your care plan? No  Are you aware of what health problems to be alert for? Yes   Comment:    [x] Yes  [x]No    Comment if answer is \"Yes\"   Do you have a good understanding of how you should manage your health? Comment:    [x] Yes  []No    Comment if answer is \"Yes\"   Do you know which symptoms to watch for that would mean you would need to call your doctor right away? Comment:      [x] Yes  []No    Comment if answer is \"No\"   Do you have any questions about the follow up process or any instructions that we have provided? Comment:    [] Yes  [x]No    Comment if answer is \"Yes\"   Did staff take your preferences into account?         [x] Yes  []No    Comment if answer is \"Yes\"

## 2018-08-21 DIAGNOSIS — G47.9 SLEEP DISORDER: Primary | ICD-10-CM

## 2018-08-21 NOTE — PROGRESS NOTES
Order placed for Split Night Sleep Study, per Verbal Order from Dr. Chato Castro on 2018. Original order had .

## 2018-08-29 ENCOUNTER — HOSPITAL ENCOUNTER (OUTPATIENT)
Dept: SLEEP MEDICINE | Age: 48
Discharge: HOME | End: 2018-08-29
Payer: MEDICAID

## 2018-08-29 DIAGNOSIS — G47.9 SLEEP DISORDER: ICD-10-CM

## 2018-08-29 PROCEDURE — 95810 POLYSOM 6/> YRS 4/> PARAM: CPT

## 2018-08-30 VITALS
HEIGHT: 64 IN | BODY MASS INDEX: 50.02 KG/M2 | SYSTOLIC BLOOD PRESSURE: 180 MMHG | DIASTOLIC BLOOD PRESSURE: 99 MMHG | WEIGHT: 293 LBS

## 2018-08-30 NOTE — PROGRESS NOTES
· Patient arrived for sleep study · Physician Orders were reviewed. · Patient questions were answered. · Patient education to include initiation of PAP therapy per protocol. · Patient demonstrated good understanding of the positive airway pressure device.

## 2018-09-03 DIAGNOSIS — E11.8 TYPE 2 DIABETES MELLITUS WITH COMPLICATION, UNSPECIFIED WHETHER LONG TERM INSULIN USE: ICD-10-CM

## 2018-09-03 DIAGNOSIS — D86.9 SARCOIDOSIS: ICD-10-CM

## 2018-09-03 DIAGNOSIS — G47.33 OSA (OBSTRUCTIVE SLEEP APNEA): Primary | ICD-10-CM

## 2018-09-03 DIAGNOSIS — N18.30 CKD (CHRONIC KIDNEY DISEASE) STAGE 3, GFR 30-59 ML/MIN (HCC): ICD-10-CM

## 2018-10-03 ENCOUNTER — HOSPITAL ENCOUNTER (INPATIENT)
Age: 48
LOS: 7 days | Discharge: HOME | DRG: 460 | End: 2018-10-10
Attending: EMERGENCY MEDICINE | Admitting: HOSPITALIST
Payer: MEDICAID

## 2018-10-03 ENCOUNTER — APPOINTMENT (OUTPATIENT)
Dept: VASCULAR SURGERY | Age: 48
DRG: 460 | End: 2018-10-03
Attending: EMERGENCY MEDICINE
Payer: MEDICAID

## 2018-10-03 ENCOUNTER — APPOINTMENT (OUTPATIENT)
Dept: CT IMAGING | Age: 48
DRG: 460 | End: 2018-10-03
Attending: EMERGENCY MEDICINE
Payer: MEDICAID

## 2018-10-03 DIAGNOSIS — L03.116 CELLULITIS OF LEFT LOWER LEG: ICD-10-CM

## 2018-10-03 DIAGNOSIS — N17.9 AKI (ACUTE KIDNEY INJURY) (HCC): Primary | ICD-10-CM

## 2018-10-03 PROBLEM — L03.90 CELLULITIS: Status: ACTIVE | Noted: 2018-10-03

## 2018-10-03 LAB
ALBUMIN SERPL-MCNC: 2.6 G/DL (ref 3.4–5)
ALBUMIN/GLOB SERPL: 0.5 {RATIO} (ref 0.8–1.7)
ALP SERPL-CCNC: 75 U/L (ref 45–117)
ALT SERPL-CCNC: 17 U/L (ref 13–56)
AMORPH CRY URNS QL MICRO: ABNORMAL
ANION GAP SERPL CALC-SCNC: 12 MMOL/L (ref 3–18)
APPEARANCE UR: ABNORMAL
AST SERPL-CCNC: 43 U/L (ref 15–37)
BACTERIA URNS QL MICRO: NEGATIVE /HPF
BASOPHILS # BLD: 0 K/UL (ref 0–0.1)
BASOPHILS NFR BLD: 0 % (ref 0–3)
BILIRUB SERPL-MCNC: 0.7 MG/DL (ref 0.2–1)
BILIRUB UR QL: NEGATIVE
BUN SERPL-MCNC: 43 MG/DL (ref 7–18)
BUN/CREAT SERPL: 9 (ref 12–20)
CALCIUM SERPL-MCNC: 8.4 MG/DL (ref 8.5–10.1)
CHLORIDE SERPL-SCNC: 96 MMOL/L (ref 100–108)
CO2 SERPL-SCNC: 24 MMOL/L (ref 21–32)
COLOR UR: YELLOW
CREAT SERPL-MCNC: 4.74 MG/DL (ref 0.6–1.3)
DIFFERENTIAL METHOD BLD: ABNORMAL
EOSINOPHIL # BLD: 0 K/UL (ref 0–0.4)
EOSINOPHIL NFR BLD: 0 % (ref 0–5)
EPITH CASTS URNS QL MICRO: ABNORMAL /LPF (ref 0–5)
ERYTHROCYTE [DISTWIDTH] IN BLOOD BY AUTOMATED COUNT: 16.5 % (ref 11.6–14.5)
EST. AVERAGE GLUCOSE BLD GHB EST-MCNC: 97 MG/DL
GLOBULIN SER CALC-MCNC: 5 G/DL (ref 2–4)
GLUCOSE BLD STRIP.AUTO-MCNC: 141 MG/DL (ref 70–110)
GLUCOSE BLD STRIP.AUTO-MCNC: 87 MG/DL (ref 70–110)
GLUCOSE BLD STRIP.AUTO-MCNC: 98 MG/DL (ref 70–110)
GLUCOSE SERPL-MCNC: 118 MG/DL (ref 74–99)
GLUCOSE UR STRIP.AUTO-MCNC: NEGATIVE MG/DL
HBA1C MFR BLD: 5 % (ref 4.2–5.6)
HCT VFR BLD AUTO: 25.3 % (ref 35–45)
HGB BLD-MCNC: 7.9 G/DL (ref 12–16)
HGB UR QL STRIP: NEGATIVE
KETONES UR QL STRIP.AUTO: ABNORMAL MG/DL
LACTATE BLD-SCNC: 1.8 MMOL/L (ref 0.4–2)
LEUKOCYTE ESTERASE UR QL STRIP.AUTO: ABNORMAL
LYMPHOCYTES # BLD: 1 K/UL (ref 0.8–3.5)
LYMPHOCYTES NFR BLD: 3 % (ref 20–51)
MCH RBC QN AUTO: 20.4 PG (ref 24–34)
MCHC RBC AUTO-ENTMCNC: 31.2 G/DL (ref 31–37)
MCV RBC AUTO: 65.4 FL (ref 74–97)
MONOCYTES # BLD: 1.4 K/UL (ref 0–1)
MONOCYTES NFR BLD: 4 % (ref 2–9)
NEUTS BAND NFR BLD MANUAL: 5 % (ref 0–5)
NEUTS SEG # BLD: 29.9 K/UL (ref 1.8–8)
NEUTS SEG NFR BLD: 88 % (ref 42–75)
NITRITE UR QL STRIP.AUTO: NEGATIVE
PH UR STRIP: 5 [PH] (ref 5–8)
PLATELET # BLD AUTO: 226 K/UL (ref 135–420)
PLATELET COMMENTS,PCOM: ABNORMAL
POTASSIUM SERPL-SCNC: 3.7 MMOL/L (ref 3.5–5.5)
PROT SERPL-MCNC: 7.6 G/DL (ref 6.4–8.2)
PROT UR STRIP-MCNC: 30 MG/DL
RBC # BLD AUTO: 3.87 M/UL (ref 4.2–5.3)
RBC #/AREA URNS HPF: 0 /HPF (ref 0–5)
RBC MORPH BLD: ABNORMAL
RBC MORPH BLD: ABNORMAL
SODIUM SERPL-SCNC: 132 MMOL/L (ref 136–145)
SP GR UR REFRACTOMETRY: 1.02 (ref 1–1.03)
UROBILINOGEN UR QL STRIP.AUTO: 0.2 EU/DL (ref 0.2–1)
WBC # BLD AUTO: 34 K/UL (ref 4.6–13.2)
WBC MORPH BLD: ABNORMAL
WBC URNS QL MICRO: ABNORMAL /HPF (ref 0–4)

## 2018-10-03 PROCEDURE — 87040 BLOOD CULTURE FOR BACTERIA: CPT | Performed by: EMERGENCY MEDICINE

## 2018-10-03 PROCEDURE — 73700 CT LOWER EXTREMITY W/O DYE: CPT

## 2018-10-03 PROCEDURE — 81001 URINALYSIS AUTO W/SCOPE: CPT | Performed by: EMERGENCY MEDICINE

## 2018-10-03 PROCEDURE — 74011000258 HC RX REV CODE- 258: Performed by: EMERGENCY MEDICINE

## 2018-10-03 PROCEDURE — 74011250637 HC RX REV CODE- 250/637: Performed by: HOSPITALIST

## 2018-10-03 PROCEDURE — 77030034849

## 2018-10-03 PROCEDURE — 96365 THER/PROPH/DIAG IV INF INIT: CPT

## 2018-10-03 PROCEDURE — 99285 EMERGENCY DEPT VISIT HI MDM: CPT

## 2018-10-03 PROCEDURE — 80053 COMPREHEN METABOLIC PANEL: CPT | Performed by: EMERGENCY MEDICINE

## 2018-10-03 PROCEDURE — 74011250636 HC RX REV CODE- 250/636: Performed by: HOSPITALIST

## 2018-10-03 PROCEDURE — 83036 HEMOGLOBIN GLYCOSYLATED A1C: CPT | Performed by: HOSPITALIST

## 2018-10-03 PROCEDURE — 94760 N-INVAS EAR/PLS OXIMETRY 1: CPT

## 2018-10-03 PROCEDURE — 96368 THER/DIAG CONCURRENT INF: CPT

## 2018-10-03 PROCEDURE — 93971 EXTREMITY STUDY: CPT

## 2018-10-03 PROCEDURE — 82962 GLUCOSE BLOOD TEST: CPT

## 2018-10-03 PROCEDURE — 96366 THER/PROPH/DIAG IV INF ADDON: CPT

## 2018-10-03 PROCEDURE — 85025 COMPLETE CBC W/AUTO DIFF WBC: CPT | Performed by: EMERGENCY MEDICINE

## 2018-10-03 PROCEDURE — 74011000250 HC RX REV CODE- 250: Performed by: HOSPITALIST

## 2018-10-03 PROCEDURE — 94640 AIRWAY INHALATION TREATMENT: CPT

## 2018-10-03 PROCEDURE — 74011250636 HC RX REV CODE- 250/636: Performed by: EMERGENCY MEDICINE

## 2018-10-03 PROCEDURE — 74011000258 HC RX REV CODE- 258: Performed by: HOSPITALIST

## 2018-10-03 PROCEDURE — 83605 ASSAY OF LACTIC ACID: CPT

## 2018-10-03 PROCEDURE — 65270000029 HC RM PRIVATE

## 2018-10-03 RX ORDER — CLINDAMYCIN PHOSPHATE 900 MG/50ML
900 INJECTION INTRAVENOUS EVERY 8 HOURS
Status: COMPLETED | OUTPATIENT
Start: 2018-10-03 | End: 2018-10-10

## 2018-10-03 RX ORDER — SODIUM CHLORIDE 9 MG/ML
100 INJECTION, SOLUTION INTRAVENOUS CONTINUOUS
Status: DISCONTINUED | OUTPATIENT
Start: 2018-10-03 | End: 2018-10-09

## 2018-10-03 RX ORDER — ACETAMINOPHEN 325 MG/1
650 TABLET ORAL
Status: DISCONTINUED | OUTPATIENT
Start: 2018-10-03 | End: 2018-10-10 | Stop reason: HOSPADM

## 2018-10-03 RX ORDER — DEXTROSE MONOHYDRATE 25 G/50ML
25-50 INJECTION, SOLUTION INTRAVENOUS AS NEEDED
Status: DISCONTINUED | OUTPATIENT
Start: 2018-10-03 | End: 2018-10-10 | Stop reason: HOSPADM

## 2018-10-03 RX ORDER — MAGNESIUM SULFATE 100 %
4 CRYSTALS MISCELLANEOUS AS NEEDED
Status: DISCONTINUED | OUTPATIENT
Start: 2018-10-03 | End: 2018-10-10 | Stop reason: HOSPADM

## 2018-10-03 RX ORDER — MORPHINE SULFATE 4 MG/ML
2 INJECTION INTRAVENOUS
Status: DISCONTINUED | OUTPATIENT
Start: 2018-10-03 | End: 2018-10-10 | Stop reason: HOSPADM

## 2018-10-03 RX ORDER — INSULIN LISPRO 100 [IU]/ML
INJECTION, SOLUTION INTRAVENOUS; SUBCUTANEOUS
Status: DISCONTINUED | OUTPATIENT
Start: 2018-10-03 | End: 2018-10-10

## 2018-10-03 RX ORDER — VENLAFAXINE HYDROCHLORIDE 75 MG/1
150 CAPSULE, EXTENDED RELEASE ORAL
Status: DISCONTINUED | OUTPATIENT
Start: 2018-10-04 | End: 2018-10-10 | Stop reason: HOSPADM

## 2018-10-03 RX ORDER — BUDESONIDE AND FORMOTEROL FUMARATE DIHYDRATE 160; 4.5 UG/1; UG/1
2 AEROSOL RESPIRATORY (INHALATION) 2 TIMES DAILY
Status: DISCONTINUED | OUTPATIENT
Start: 2018-10-03 | End: 2018-10-03 | Stop reason: CLARIF

## 2018-10-03 RX ORDER — SODIUM CHLORIDE 0.9 % (FLUSH) 0.9 %
5-10 SYRINGE (ML) INJECTION AS NEEDED
Status: DISCONTINUED | OUTPATIENT
Start: 2018-10-03 | End: 2018-10-10 | Stop reason: HOSPADM

## 2018-10-03 RX ORDER — VANCOMYCIN 2 GRAM/500 ML IN 0.9 % SODIUM CHLORIDE INTRAVENOUS
2000 ONCE
Status: COMPLETED | OUTPATIENT
Start: 2018-10-03 | End: 2018-10-06

## 2018-10-03 RX ORDER — BUDESONIDE 0.5 MG/2ML
500 INHALANT ORAL
Status: DISCONTINUED | OUTPATIENT
Start: 2018-10-03 | End: 2018-10-10 | Stop reason: HOSPADM

## 2018-10-03 RX ORDER — HEPARIN SODIUM 5000 [USP'U]/ML
5000 INJECTION, SOLUTION INTRAVENOUS; SUBCUTANEOUS EVERY 8 HOURS
Status: DISCONTINUED | OUTPATIENT
Start: 2018-10-03 | End: 2018-10-10 | Stop reason: HOSPADM

## 2018-10-03 RX ORDER — SIMVASTATIN 20 MG/1
20 TABLET, FILM COATED ORAL
Status: DISCONTINUED | OUTPATIENT
Start: 2018-10-03 | End: 2018-10-10 | Stop reason: HOSPADM

## 2018-10-03 RX ORDER — LANOLIN ALCOHOL/MO/W.PET/CERES
324 CREAM (GRAM) TOPICAL
Status: DISCONTINUED | OUTPATIENT
Start: 2018-10-04 | End: 2018-10-10 | Stop reason: HOSPADM

## 2018-10-03 RX ORDER — SODIUM CHLORIDE 0.9 % (FLUSH) 0.9 %
5-10 SYRINGE (ML) INJECTION EVERY 8 HOURS
Status: DISCONTINUED | OUTPATIENT
Start: 2018-10-03 | End: 2018-10-05

## 2018-10-03 RX ORDER — ARFORMOTEROL TARTRATE 15 UG/2ML
15 SOLUTION RESPIRATORY (INHALATION)
Status: DISCONTINUED | OUTPATIENT
Start: 2018-10-03 | End: 2018-10-10 | Stop reason: HOSPADM

## 2018-10-03 RX ORDER — ACYCLOVIR 800 MG/1
400 TABLET ORAL DAILY
Status: DISCONTINUED | OUTPATIENT
Start: 2018-10-04 | End: 2018-10-10 | Stop reason: HOSPADM

## 2018-10-03 RX ADMIN — MORPHINE SULFATE 2 MG: 4 INJECTION INTRAVENOUS at 18:55

## 2018-10-03 RX ADMIN — CLINDAMYCIN PHOSPHATE 900 MG: 900 INJECTION, SOLUTION INTRAVENOUS at 18:35

## 2018-10-03 RX ADMIN — SODIUM CHLORIDE 150 ML/HR: 900 INJECTION, SOLUTION INTRAVENOUS at 15:24

## 2018-10-03 RX ADMIN — Medication 10 ML: at 22:00

## 2018-10-03 RX ADMIN — CEFTRIAXONE 1 G: 1 INJECTION, POWDER, FOR SOLUTION INTRAMUSCULAR; INTRAVENOUS at 12:58

## 2018-10-03 RX ADMIN — PIPERACILLIN SODIUM,TAZOBACTAM SODIUM 3.38 G: 3; .375 INJECTION, POWDER, FOR SOLUTION INTRAVENOUS at 18:11

## 2018-10-03 RX ADMIN — SODIUM CHLORIDE 1000 ML: 900 INJECTION, SOLUTION INTRAVENOUS at 12:59

## 2018-10-03 RX ADMIN — ARFORMOTEROL TARTRATE 15 MCG: 15 SOLUTION RESPIRATORY (INHALATION) at 20:15

## 2018-10-03 RX ADMIN — HEPARIN SODIUM 5000 UNITS: 5000 INJECTION INTRAVENOUS; SUBCUTANEOUS at 18:35

## 2018-10-03 RX ADMIN — SIMVASTATIN 20 MG: 20 TABLET, FILM COATED ORAL at 21:57

## 2018-10-03 RX ADMIN — BUDESONIDE 500 MCG: 0.5 INHALANT RESPIRATORY (INHALATION) at 20:15

## 2018-10-03 RX ADMIN — Medication 10 ML: at 19:07

## 2018-10-03 RX ADMIN — MORPHINE SULFATE 2 MG: 4 INJECTION INTRAVENOUS at 21:57

## 2018-10-03 RX ADMIN — LABETALOL HYDROCHLORIDE 300 MG: 100 TABLET, FILM COATED ORAL at 19:06

## 2018-10-03 RX ADMIN — VANCOMYCIN HYDROCHLORIDE 2000 MG: 10 INJECTION, POWDER, LYOPHILIZED, FOR SOLUTION INTRAVENOUS at 13:00

## 2018-10-03 NOTE — PROGRESS NOTES
Pharmacy: Non-Formulary Medication [facility] Please note that medication (SYMBICORT - budesonide + formoterol inhaler) is not on the formulary and has been changed to arformoterol + budesonide nebulzation. If patient prefers to take home medication, please have family member bring from home for pharmacy to verify. Please call pharmacy for questions.  
 
Thanks, 
José Lynn, PHARMD

## 2018-10-03 NOTE — IP AVS SNAPSHOT
[address] Patient: Stephanie Valladares MRN: [id number] DFM:[id number] About your hospitalization You were admitted on:  October 3, 2018 You last received care in the:  [address] [place] [address],[address] You were discharged on:  October 10, 2018 Why you were hospitalized Your primary diagnosis was:  Cellulitis Your diagnoses also included:  Acute On Chronic Renal Failure (Hcc), Chronic Anemia, Ckd (Chronic Kidney Disease) Stage 3, Gfr 30-59 Ml/Min (Hcc), Morbid Obesity (Hcc), Type 2 Diabetes Mellitus (Hcc), Essential Hypertension, Bilateral Edema Of Lower Extremity Follow-up Information Follow up With Details Comments Contact Info Nuzhat Reid NP On 10/9/2018 1pm  [address] [address] [phone] Discharge Orders None A check kiersten indicates which time of day the medication should be taken. My Medications START taking these medications Instructions Each Dose to Equal  
 Morning Noon Evening Bedtime  
 amLODIPine 10 mg tablet Commonly known as:  Michaelene Leon Start taking on:  10/11/2018 Your last dose was: Your next dose is: Take 1 Tab by mouth daily. 10 mg  
    
   
   
   
  
 clindamycin 300 mg capsule Commonly known as:  CLEOCIN Your last dose was: Your next dose is: Take 1 Cap by mouth three (3) times daily. 300 mg CONTINUE taking these medications Instructions Each Dose to Equal  
 Morning Noon Evening Bedtime  
 acetaminophen 325 mg tablet Commonly known as:  TYLENOL Your last dose was: Your next dose is: Take 2 Tabs by mouth every four (4) hours as needed for Pain. 650 mg  
    
   
   
   
  
 acyclovir 400 mg tablet Commonly known as:  ZOVIRAX Your last dose was: Your next dose is: Take 400 mg by mouth daily. 400 mg  
    
   
   
   
  
 budesonide-formoterol 160-4.5 mcg/actuation Hfaa Commonly known as:  SYMBICORT Your last dose was: Your next dose is: Take 2 Puffs by inhalation two (2) times a day. 2 Puff  
    
   
   
   
  
 cyclobenzaprine 5 mg tablet Commonly known as:  FLEXERIL Your last dose was: Your next dose is: Take 1 Tab by mouth three (3) times daily as needed for Muscle Spasm(s). 5 mg  
    
   
   
   
  
 ferrous sulfate 324 mg (65 mg iron) tablet Your last dose was: Your next dose is: Take 324 mg by mouth Daily (before breakfast). 324 mg  
    
   
   
   
  
 labetalol 200 mg tablet Commonly known as:  Krystyna Motto Your last dose was: Your next dose is: Take 300 mg by mouth two (2) times a day. 300 mg MULTIVITAMIN PO Your last dose was: Your next dose is: Take  by mouth. simvastatin 20 mg tablet Commonly known as:  ZOCOR Your last dose was: Your next dose is: Take 20 mg by mouth nightly. 20 mg  
    
   
   
   
  
 venlafaxine- mg capsule Commonly known as:  EFFEXOR-XR Your last dose was: Your next dose is: Take  by mouth daily. Where to Get Your Medications These medications were sent to [facility], [address] [place] [address]  [address] [facility], [address] [place] [address] Hours:  24-hours Phone:  [phone]  
  amLODIPine 10 mg tablet Information on where to get these meds will be given to you by the nurse or doctor. ! Ask your nurse or doctor about these medications  
  clindamycin 300 mg capsule Discharge Instructions DISCHARGE SUMMARY from Nurse PATIENT INSTRUCTIONS: 
 
 
F-face looks uneven A-arms unable to move or move unevenly S-speech slurred or non-existent T-time-call 911 as soon as signs and symptoms begin-DO NOT go Back to bed or wait to see if you get better-TIME IS BRAIN. Warning Signs of HEART ATTACK Call 911 if you have these symptoms: 
? Chest discomfort. Most heart attacks involve discomfort in the center of the chest that lasts more than a few minutes, or that goes away and comes back. It can feel like uncomfortable pressure, squeezing, fullness, or pain. ? Discomfort in other areas of the upper body. Symptoms can include pain or discomfort in one or both arms, the back, neck, jaw, or stomach. ? Shortness of breath with or without chest discomfort. ? Other signs may include breaking out in a cold sweat, nausea, or lightheadedness. Don't wait more than five minutes to call [address]! Fast action can save your life. Calling 911 is almost always the fastest way to get lifesaving treatment. Emergency Medical Services staff can begin treatment when they arrive  up to an hour sooner than if someone gets to the hospital by car. The discharge information has been reviewed with the patient. The patient verbalized understanding. Discharge medications reviewed with the patient and appropriate educational materials and side effects teaching were provided. Patient armband removed and shredded. ___________________________________________________________________________________________________________________________________ MyChart Announcement We are excited to announce that we are making your provider's discharge notes available to you in Sajanhart.   You will see these notes when they are completed and signed by the physician that discharged you from your recent hospital stay. If you have any questions or concerns about any information you see in [facility], please call the Health Information Department where you were seen or reach out to your Primary Care Provider for more information about your plan of care. Introducing [facility]! [facility] introduces [facility]Spring patient portal. Now you can access parts of your medical record, email your doctor's office, and request medication refills online. 1. In your internet browser, go to [email or web address] 2. Click on the First Time User? Click Here link in the Sign In box. You will see the New Member Sign Up page. 3. Enter your [facility]Spring Access Code exactly as it appears below. You will not need to use this code after youve completed the sign-up process. If you do not sign up before the expiration date, you must request a new code. · [facility]Spring Access Code: [email or web address] Expires: 11/18/2018  2:35 PM 
 
4. Enter the last four digits of your Social Security Number (xxxx) and Date of Birth (mm/dd/yyyy) as indicated and click Submit. You will be taken to the next sign-up page. 5. Create a HealthSpring ID. This will be your HealthSpring login ID and cannot be changed, so think of one that is secure and easy to remember. 6. Create a [facility]Spring password. You can change your password at any time. 7. Enter your Password Reset Question and Answer. This can be used at a later time if you forget your password. 8. Enter your e-mail address. You will receive e-mail notification when new information is available in [address] 9. Click Sign Up. You can now view and download portions of your medical record. 10. Click the Download Summary menu link to download a portable copy of your medical information.  
 
If you have questions, please visit the Frequently Asked Questions section of the [employer] Remember, agencyQhart is NOT to be used for urgent needs. For medical emergencies, dial 911. Now available from your iPhone and Android! Introducing Addison Jack As a [facility] patient, I wanted to make you aware of our electronic visit tool called Addison Jack. Betterific 24/7 allows you to connect within minutes with a medical provider 24 hours a day, seven days a week via a mobile device or tablet or logging into a secure website from your computer. You can access Addison Jack from anywhere in the [place]. A virtual visit might be right for you when you have a simple condition and feel like you just dont want to get out of bed, or cant get away from work for an appointment, when your regular [facility] provider is not available (evenings, weekends or holidays), or when youre out of town and need minor care. Electronic visits cost only $49 and if the [facility] 24/7 provider determines a prescription is needed to treat your condition, one can be electronically transmitted to a nearby pharmacy*. Please take a moment to enroll today if you have not already done so. The enrollment process is free and takes just a few minutes. To enroll, please download the Betterific 24/Ammado otis to your tablet or phone, or visit [email or web address] to enroll on your computer. And, as an [address] [place] [address] patient with a [facility] account, the results of your visits will be scanned into your electronic medical record and your primary care provider will be able to view the scanned results. We urge you to continue to see your regular [facility] provider for your ongoing medical care. And while your primary care provider may not be the one available when you seek a Addison Jack virtual visit, the peace of mind you get from getting a real diagnosis real time can be priceless. For more information on Addison Jack, view our Frequently Asked Questions (FAQs) at [email or web address]. Sincerely, 
 
Elias Matson MD 
Chief Medical Officer [employer] *:  certain medications cannot be prescribed via Addison Jack Providers Seen During Your Hospitalization Provider Specialty Primary office phone Alaina Mcgovern MD Emergency Medicine [phone] Sasha Valencia DO Internal Medicine [phone] Maritza Toledo MD Internal Medicine [phone] Your Primary Care Physician (PCP) Primary Care Physician Office Phone Office Fax Danny Frias [facility] [phone] You are allergic to the following No active allergies Recent Documentation Height Weight Breastfeeding? BMI OB Status Smoking Status 1.524 m 145.2 kg No 62.5 kg/m2 Premenopausal Former Smoker Emergency Contacts Name Discharge Info Relation Home Work [facility] DISCHARGE CAREGIVER [3] Child [2] [phone] Patient Belongings The following personal items are in your possession at time of discharge: 
  Dental Appliances: None  Visual Aid: None      Home Medications: None   Jewelry: None  Clothing: Dress    Other Valuables: None Discharge Instructions Attachments/References CELLULITIS (ENGLISH) AMLODIPINE (BY MOUTH) (ENGLISH) CLINDAMYCIN (BY MOUTH) (ENGLISH) Patient Handouts Cellulitis: Care Instructions Your Care Instructions Cellulitis is a skin infection caused by bacteria, most often strep or staph. It often occurs after a break in the skin from a scrape, cut, bite, or puncture, or after a rash. Cellulitis may be treated without doing tests to find out what caused it. But your doctor may do tests, if needed, to look for a specific bacteria, like methicillin-resistant Staphylococcus aureus (MRSA). The doctor has checked you carefully, but problems can develop later. If you notice any problems or new symptoms, get medical treatment right away. Follow-up care is a key part of your treatment and safety. Be sure to make and go to all appointments, and call your doctor if you are having problems. It's also a good idea to know your test results and keep a list of the medicines you take. How can you care for yourself at home? · Take your antibiotics as directed. Do not stop taking them just because you feel better. You need to take the full course of antibiotics. · Prop up the infected area on pillows to reduce pain and swelling. Try to keep the area above the level of your heart as often as you can. · If your doctor told you how to care for your wound, follow your doctor's instructions. If you did not get instructions, follow this general advice: ¨ Wash the wound with clean water 2 times a day. Don't use hydrogen peroxide or alcohol, which can slow healing. ¨ You may cover the wound with a thin layer of petroleum jelly, such as Vaseline, and a nonstick bandage. ¨ Apply more petroleum jelly and replace the bandage as needed. · Be safe with medicines. Take pain medicines exactly as directed. ¨ If the doctor gave you a prescription medicine for pain, take it as prescribed. ¨ If you are not taking a prescription pain medicine, ask your doctor if you can take an over-the-counter medicine. To prevent cellulitis in the future · Try to prevent cuts, scrapes, or other injuries to your skin. Cellulitis most often occurs where there is a break in the skin. · If you get a scrape, cut, mild burn, or bite, wash the wound with clean water as soon as you can to help avoid infection. Don't use hydrogen peroxide or alcohol, which can slow healing. · If you have swelling in your legs (edema), support stockings and good skin care may help prevent leg sores and cellulitis. · Take care of your feet, especially if you have diabetes or other conditions that increase the risk of infection. Wear shoes and socks. Do not go barefoot. If you have athlete's foot or other skin problems on your feet, talk to your doctor about how to treat them. When should you call for help? Call your doctor now or seek immediate medical care if: 
  · You have signs that your infection is getting worse, such as: 
¨ Increased pain, swelling, warmth, or redness. ¨ Red streaks leading from the area. ¨ Pus draining from the area. ¨ A fever.  
  · You get a rash.  
 Watch closely for changes in your health, and be sure to contact your doctor if: 
  · You do not get better as expected. Where can you learn more? Go to http://marty-chente.info/. Eran Palacios in the search box to learn more about \"Cellulitis: Care Instructions. \" Current as of: April 18, 2018 Content Version: 11.8 © 9625-9834 [employer]. Care instructions adapted under license by [employer] (which disclaims liability or warranty for this information). If you have questions about a medical condition or this instruction, always ask your healthcare professional. [address] any warranty or liability for your use of this information. Amlodipine (By mouth) Amlodipine (op-WLR-iw-peen) Treats high blood pressure and angina (chest pain). This medicine is a calcium channel blocker. Brand Name(s): Norvasc There may be other brand names for this medicine. When This Medicine Should Not Be Used: This medicine is not right for everyone. Do not use it if you had an allergic reaction to amlodipine. How to Use This Medicine:  
Tablet, Dissolving Tablet · Take your medicine as directed. Your dose may need to be changed several times to find what works best for you. Take this medicine at the same time each day. · Read and follow the patient instructions that come with this medicine. Talk to your doctor or pharmacist if you have any questions. · Missed dose: Take a dose as soon as you remember. If it has been more than 12 hours since you were supposed to take your dose, skip the missed dose and take your next regular dose at the regular time. · Store the medicine in a closed container at room temperature, away from heat, moisture, and direct light. Drugs and Foods to Avoid: Ask your doctor or pharmacist before using any other medicine, including over-the-counter medicines, vitamins, and herbal products. · Some medicines can affect how amlodipine works. Tell your doctor if you are also using any of the following: ¨ Clarithromycin, cyclosporine, diltiazem, itraconazole, ritonavir, sildenafil, simvastatin, tacrolimus Warnings While Using This Medicine: · Tell your doctor if you are pregnant or breastfeeding, or if you have liver disease, heart disease, coronary artery disease, or aortic stenosis. · This medicine could lower your blood pressure too much, especially when you first use it or if you are dehydrated. Stand or sit up slowly if you feel lightheaded or dizzy. · Your doctor will check your progress and the effects of this medicine at regular visits. Keep all appointments. · Do not stop using this medicine without asking your doctor, even if you feel well. This medicine will not cure high blood pressure, but it will help keep it in normal range. You may have to take blood pressure medicine for the rest of your life. · Keep all medicine out of the reach of children. Never share your medicine with anyone. Possible Side Effects While Using This Medicine:  
Call your doctor right away if you notice any of these side effects: · Allergic reaction: Itching or hives, swelling in your face or hands, swelling or tingling in your mouth or throat, chest tightness, trouble breathing · Lightheadedness, dizziness · New or worsening chest pain · Swelling in your hands, ankles, or legs · Trouble breathing, nausea, unusual sweating, fainting If you notice other side effects that you think are caused by this medicine, tell your doctor. Call your doctor for medical advice about side effects. You may report side effects to FDA at 9-323-FDA-2995 © 2017 [address] Information is for End User's use only and may not be sold, redistributed or otherwise used for commercial purposes. The above information is an  only. It is not intended as medical advice for individual conditions or treatments. Talk to your doctor, nurse or pharmacist before following any medical regimen to see if it is safe and effective for you. Clindamycin (By mouth) Clindamycin (hmvq-qr-LIC-sin) Treats infections. Brand Name(s): Cleocin, Cleocin HCl, Cleocin Pediatric There may be other brand names for this medicine. When This Medicine Should Not Be Used: This medicine is not right for everyone. Do not use it if you had an allergic reaction to clindamycin or lincomycin. How to Use This Medicine:  
Capsule, Liquid · Your doctor will tell you how much medicine to use. Do not use more than directed. · Capsule: Swallow with a full glass of water. · Oral liquid: Measure the oral liquid medicine with a marked measuring spoon, oral syringe, or medicine cup. · Take all of the medicine in your prescription to clear up your infection, even if you feel better after the first few doses. · Missed dose: Take a dose as soon as you remember. If it is almost time for your next dose, wait until then and take a regular dose. Do not take extra medicine to make up for a missed dose. · Store the medicine in a closed container at room temperature, away from heat, moisture, and direct light. Oral liquid: Do not refrigerate or freeze. Throw away any unused medicine after 14 days. Drugs and Foods to Avoid: Ask your doctor or pharmacist before using any other medicine, including over-the-counter medicines, vitamins, and herbal products. · Some medicines can affect how clindamycin works. Tell your doctor if you are using erythromycin. Warnings While Using This Medicine: · Tell your doctor if you are pregnant or breastfeeding, or if you have kidney disease, liver disease, allergies (including an allergy to aspirin), asthma, or stomach or bowel problems (including colitis). · This medicine may cause severe skin reactions. · This medicine can cause diarrhea. Call your doctor if the diarrhea becomes severe, does not stop, or is bloody. Do not take any medicine to stop diarrhea until you have talked to your doctor. Diarrhea can occur 2 months or more after you stop taking this medicine. · Keep all medicine out of the reach of children. Never share your medicine with anyone. Possible Side Effects While Using This Medicine:  
Call your doctor right away if you notice any of these side effects: · Allergic reaction: Itching or hives, swelling in your face or hands, swelling or tingling in your mouth or throat, chest tightness, trouble breathing · Blistering, peeling, red skin rash · Fever, chills, cough, sore throat, body aches · Severe diarrhea that does not go away, stomach cramps · Unusual bleeding, bruising, or weakness If you notice these less serious side effects, talk with your doctor: · Mild diarrhea, nausea If you notice other side effects that you think are caused by this medicine, tell your doctor. Call your doctor for medical advice about side effects. You may report side effects to FDA at 6-737-FNN-2006 © 2017 [facility] Information is for End User's use only and may not be sold, redistributed or otherwise used for commercial purposes. The above information is an  only. It is not intended as medical advice for individual conditions or treatments.  Talk to your doctor, nurse or pharmacist before following any medical regimen to see if it is safe and effective for you. Please provide this summary of care documentation to your next provider. Signatures-by signing, you are acknowledging that this After Visit Summary has been reviewed with you and you have received a copy. Patient Signature:  ____________________________________________________________ Date:  ____________________________________________________________  
  
Primo Feast Provider Signature:  ____________________________________________________________ Date:  ____________________________________________________________

## 2018-10-03 NOTE — ED NOTES
Patient has completed bedside duplex exam.  
Now leaving for medical floor via ED tech. Patient remains alert, oriented, and able to turn minimally x1. Cellular phone, 's license, and other cards at bedside in belongings bag. Remains on Vancomycin and Normal saline gtts which are tolerated well. No acute distress noted. Needs met.

## 2018-10-03 NOTE — ED PROVIDER NOTES
EMERGENCY DEPARTMENT HISTORY AND PHYSICAL EXAM 
 
12:23 PM 
 
 
Date: 10/3/2018 Patient Name: Jacqueline Fothergill History of Presenting Illness Chief Complaint Patient presents with  Foot Swelling  Leg Swelling History Provided By: Patient Chief Complaint: Leg swelling Duration:  Days Timing:  Worsening Location: Left Quality: Soreness Severity: Moderate Modifying Factors: N/A Associated Symptoms: Denies fever, vomiting, chest pain, and SOB. Additional History (Context): Jacqueline Fothergill is a 46 y.o. female with a history of diabetes who presents with worsening left leg swelling for several days with reported drainage. Her pain is moderate and describes it as a soreness. She denies any fever, vomiting, chest pain, and SOB. Drinks alcohol occasionally. Denies tobacco use. Denies drug use. States she controls her diabetes with diet. PCP: Clayton Boyle NP Current Facility-Administered Medications Medication Dose Route Frequency Provider Last Rate Last Dose  sodium chloride (NS) flush 5-10 mL  5-10 mL IntraVENous PRN Sarahi Adams MD      
 cefTRIAXone (ROCEPHIN) 1 g in 0.9% sodium chloride (MBP/ADV) 50 mL MBP  1 g IntraVENous Q24H Sarahi Adams MD   Stopped at 10/03/18 1332  
 vancomycin (VANCOCIN) 2000 mg in  ml infusion  2,000 mg IntraVENous Chito Quezada MD      
 0.9% sodium chloride infusion  150 mL/hr IntraVENous CONTINUOUS Sarahi Adams MD      
 
Current Outpatient Prescriptions Medication Sig Dispense Refill  acetaminophen (TYLENOL) 325 mg tablet Take 2 Tabs by mouth every four (4) hours as needed for Pain. 20 Tab 0  cyclobenzaprine (FLEXERIL) 5 mg tablet Take 1 Tab by mouth three (3) times daily as needed for Muscle Spasm(s). 12 Tab 0  
 budesonide-formoterol (SYMBICORT) 160-4.5 mcg/actuation HFAA Take 2 Puffs by inhalation two (2) times a day. 1 Inhaler 5  MULTIVITAMIN PO Take  by mouth.  acyclovir (ZOVIRAX) 400 mg tablet Take 400 mg by mouth daily.  ferrous sulfate 324 mg (65 mg iron) tablet Take 324 mg by mouth Daily (before breakfast).  venlafaxine-SR (EFFEXOR-XR) 150 mg capsule Take  by mouth daily.  labetalol (NORMODYNE) 200 mg tablet Take 300 mg by mouth two (2) times a day.  simvastatin (ZOCOR) 20 mg tablet Take 20 mg by mouth nightly. Past History Past Medical History: 
Past Medical History:  
Diagnosis Date  Anemia  Bone lesion  Chronic kidney disease  Depressed  Diabetes ([facility] [facility]  Diabetes mellitus ([facility] [facility]  Hypertension  Kidney problem  Pneumonia  Sarcoidosis  Thyroid disorder  Uterus problem Past Surgical History: 
Past Surgical History:  
Procedure Laterality Date  HX  SECTION    
 HX OTHER SURGICAL    
 neck biopsy  HX TUBAL LIGATION Family History: 
Family History Problem Relation Age of Onset  Hypertension Mother  Diabetes Mother  Cancer Mother  Cancer Sister Social History: 
Social History Substance Use Topics  Smoking status: Former Smoker Types: Cigarettes  Smokeless tobacco: Former User  Alcohol use Yes Comment: beer rarely Allergies: 
No Known Allergies Review of Systems Review of Systems Constitutional: Negative for chills and fever. Respiratory: Negative for shortness of breath. Cardiovascular: Positive for leg swelling. Negative for chest pain. Gastrointestinal: Negative for vomiting. All other systems reviewed and are negative. Physical Exam  
 
Visit Vitals  /80  Pulse 90  Temp 99.2 °F (37.3 °C)  Resp 26  
 Ht 5' (1.524 m)  Wt 145.2 kg (320 lb)  SpO2 99%  BMI 62.5 kg/m2 Physical Exam  
Constitutional: She is oriented to person, place, and time. She appears well-developed and well-nourished. No distress. HENT:  
Head: Normocephalic and atraumatic. Mouth/Throat: Oropharynx is clear and moist.  
Eyes: Conjunctivae and EOM are normal. Pupils are equal, round, and reactive to light. No scleral icterus. Neck: Normal range of motion. Neck supple. Cardiovascular: Normal rate, regular rhythm and normal heart sounds. No murmur heard. Pulmonary/Chest: Effort normal and breath sounds normal. No respiratory distress. Abdominal: Soft. Bowel sounds are normal. She exhibits no distension. There is no tenderness. Musculoskeletal:  
Left leg is considerably more swollen. Lymphadenopathy:  
  She has no cervical adenopathy. Neurological: She is alert and oriented to person, place, and time. No sensory deficit. Coordination normal.  
Skin: Skin is warm and dry. No rash noted. Increased erythema, induration and tenderness of left leg. Small areas of blistering forming. Psychiatric: She has a normal mood and affect. Her behavior is normal.  
Nursing note and vitals reviewed. Diagnostic Study Results Labs - Recent Results (from the past 12 hour(s)) GLUCOSE, POC Collection Time: 10/03/18 12:17 PM  
Result Value Ref Range Glucose (POC) 141 (H) 70 - 110 mg/dL METABOLIC PANEL, COMPREHENSIVE Collection Time: 10/03/18 12:23 PM  
Result Value Ref Range Sodium 132 (L) 136 - 145 mmol/L Potassium 3.7 3.5 - 5.5 mmol/L Chloride 96 (L) 100 - 108 mmol/L  
 CO2 24 21 - 32 mmol/L Anion gap 12 3.0 - 18 mmol/L Glucose 118 (H) 74 - 99 mg/dL BUN 43 (H) 7.0 - 18 MG/DL Creatinine 4.74 (H) 0.6 - 1.3 MG/DL  
 BUN/Creatinine ratio 9 (L) 12 - 20 GFR est AA 12 (L) >60 ml/min/1.73m2 GFR est non-AA 10 (L) >60 ml/min/1.73m2 Calcium 8.4 (L) 8.5 - 10.1 MG/DL Bilirubin, total 0.7 0.2 - 1.0 MG/DL  
 ALT (SGPT) 17 13 - 56 U/L  
 AST (SGOT) 43 (H) 15 - 37 U/L Alk. phosphatase 75 45 - 117 U/L Protein, total 7.6 6.4 - 8.2 g/dL Albumin 2.6 (L) 3.4 - 5.0 g/dL Globulin 5.0 (H) 2.0 - 4.0 g/dL A-G Ratio 0.5 (L) 0.8 - 1.7 CBC WITH AUTOMATED DIFF Collection Time: 10/03/18 12:23 PM  
Result Value Ref Range WBC 34.0 (H) 4.6 - 13.2 K/uL  
 RBC 3.87 (L) 4.20 - 5.30 M/uL HGB 7.9 (L) 12.0 - 16.0 g/dL HCT 25.3 (L) 35.0 - 45.0 % MCV 65.4 (L) 74.0 - 97.0 FL  
 MCH 20.4 (L) 24.0 - 34.0 PG  
 MCHC 31.2 31.0 - 37.0 g/dL  
 RDW 16.5 (H) 11.6 - 14.5 % PLATELET 166 851 - 140 K/uL NEUTROPHILS 88 (H) 42 - 75 % BAND NEUTROPHILS 5 0 - 5 % LYMPHOCYTES 3 (L) 20 - 51 % MONOCYTES 4 2 - 9 % EOSINOPHILS 0 0 - 5 % BASOPHILS 0 0 - 3 %  
 ABS. NEUTROPHILS 29.9 (H) 1.8 - 8.0 K/UL  
 ABS. LYMPHOCYTES 1.0 0.8 - 3.5 K/UL  
 ABS. MONOCYTES 1.4 (H) 0 - 1.0 K/UL  
 ABS. EOSINOPHILS 0.0 0.0 - 0.4 K/UL  
 ABS. BASOPHILS 0.0 0.0 - 0.1 K/UL PLATELET COMMENTS LARGE PLATELETS    
 RBC COMMENTS HYPOCHROMIA 3+ 
    
 RBC COMMENTS MICROCYTOSIS 3+ WBC COMMENTS TOXIC GRANULATION    
 DF MANUAL POC LACTIC ACID Collection Time: 10/03/18 12:26 PM  
Result Value Ref Range Lactic Acid (POC) 1.8 0.4 - 2.0 mmol/L Radiologic Studies -  
CT TIB/FIB LT WO CONT Final Result Medical Decision Making I am the first provider for this patient. I reviewed the vital signs, available nursing notes, past medical history, past surgical history, family history and social history. Vital Signs-Reviewed the patient's vital signs. Pulse Oximetry Analysis -  99% on room air (Interpretation) WNL Records Reviewed: Nursing Notes and Old Medical Records (Time of Review: 12:23 PM) ED Course: Progress Notes, Reevaluation, and Consults: 
 
3:05 PM Consult: I discussed care with Dr. Erin George (Hospitalist). It was a standard discussion including patient history, chief complaint, available diagnostic results, and predicted treatment course. Agrees to admit. 3:45 PM DISCUSSED WITH TECH, PVL NEGATIVE Provider Notes (Medical Decision Making): MDM Number of Diagnoses or Management Options JOON (acute kidney injury) [facility]):  
Cellulitis of left lower leg:  
Diagnosis management comments: Left leg with increased erythema, induration and tenderness. Small areas of blistering forming. Left leg is considerably more swollen. Concerning for cellulitis. Will doppler for pulse. CT to rule out necrotizing fasciitis, although afebrile with normal vitals and neg poc lactic in ED. Fluid bolus 1 liter started with antibiotics awaiting ct scan no evidence of severe sepsis or septic shock at present Amount and/or Complexity of Data Reviewed Clinical lab tests: ordered and reviewed Tests in the radiology section of CPT®: ordered and reviewed For Hospitalized Patients: 
 
1. Hospitalization Decision Time: The decision to hospitalize the patient was made by Brent Mcelroy MD at 3:03 PM on 10/3/2018 2. Aspirin: Aspirin was not given because the patient did not present with a stroke at the time of their Emergency Department evaluation Diagnosis Clinical Impression: 1. JOON (acute kidney injury) ([address] 2. Cellulitis of left lower leg Disposition: admit Follow-up Information None Patient's Medications Start Taking No medications on file Continue Taking ACETAMINOPHEN (TYLENOL) 325 MG TABLET    Take 2 Tabs by mouth every four (4) hours as needed for Pain. ACYCLOVIR (ZOVIRAX) 400 MG TABLET    Take 400 mg by mouth daily. BUDESONIDE-FORMOTEROL (SYMBICORT) 160-4.5 MCG/ACTUATION HFAA    Take 2 Puffs by inhalation two (2) times a day. CYCLOBENZAPRINE (FLEXERIL) 5 MG TABLET    Take 1 Tab by mouth three (3) times daily as needed for Muscle Spasm(s). FERROUS SULFATE 324 MG (65 MG IRON) TABLET    Take 324 mg by mouth Daily (before breakfast). LABETALOL (NORMODYNE) 200 MG TABLET    Take 300 mg by mouth two (2) times a day. MULTIVITAMIN PO    Take  by mouth. SIMVASTATIN (ZOCOR) 20 MG TABLET    Take 20 mg by mouth nightly. VENLAFAXINE-SR (EFFEXOR-XR) 150 MG CAPSULE    Take  by mouth daily. These Medications have changed No medications on file Stop Taking HYDROCODONE-ACETAMINOPHEN (NORCO) 7.5-325 MG PER TABLET    Take 1 Tab by mouth every six (6) hours as needed. Max Daily Amount: 4 Tabs.  
 
_______________________________ Attestations: 
Scribe Attestation Anabela Lea acting as a scribe for and in the presence of Sarahi Adams MD     
October 03, 2018 at 3:09 PM 
    
Provider Attestation:     
I personally performed the services described in the documentation, reviewed the documentation, as recorded by the scribe in my presence, and it accurately and completely records my words and actions. October 03, 2018 at 3:09 PM - Sarahi Adams MD   
_______________________________

## 2018-10-03 NOTE — PROGRESS NOTES
Pharmacy Dosing Services: Vancomycin Indication: Sepsis Day of therapy: 1 Other Antimicrobials (Include dose, start day & day of therapy): 
Ceftriaxone 1 gm IV every 24 hours Loading dose (date given): 2000 mg 
Current Maintenance dose: none at this time Goal Vancomycin Level: 15-20 
(Trough 15-20 for most infections, 20 for meningitis/osteomyelitis, pre-HD level ~25) Vancomycin Level (if drawn): none at this time Significant Cultures: pending Renal function stable? (unstable defined as SCr increase of 0.5 mg/dL or > 50% increase from baseline, whichever is greater) (Y/N): N  
 
CAPD, Hemodialysis or Renal Replacement Therapy (Y/N): N Recent Labs 10/03/18 
 1223 CREA  4.74* BUN  43* WBC  34.0* Temp (24hrs), Av.2 °F (37.3 °C), Min:99.2 °F (37.3 °C), Max:99.2 °F (37.3 °C) Creatinine Clearance (Creatinine Clearance (ml/min)): 19 ml/min Regimen assessment: Vancomycin dosing per level - SCr elevated compared to baseline Maintenance dose: none at this time Next scheduled level: Random on 10/4 at 1200 Pharmacy will follow daily and adjust medications as appropriate for renal function and/or serum levels.  
 
Thank you, 
Andree Reveles, PHARMD

## 2018-10-03 NOTE — H&P
Internal Medicine History and Physical 
   
 
 
Subjective HPI: Kal Overton is a 46 y.o. female with a PMHx of DM-II-diet controlled, HTN, CKD III, HLD, morbid obesity, who presented to the ED with 2 days of LLE pain and redness. She has a history of cellulitis on both extremities. She denied any fever or chills on this presentation nor any SOB or CP. She denies any recent sick contacts. She admits to area of broken skin on the lower part of her leg that opened up and drained two days ago. She decided to come in for further evaluation due to worsening pain. In the ED, she was found to have leukocytosis > 30 and JOON on CKD. CT imaging was done wo contrast that did not report concern for necrotizing fasciitis. PMHx: 
Past Medical History:  
Diagnosis Date  Anemia  Bone lesion  Chronic kidney disease  Depressed  Diabetes ([address]  Diabetes mellitus ([address]  Hypertension  Kidney problem  Pneumonia  Sarcoidosis  Thyroid disorder  Uterus problem PSurgHx: 
Past Surgical History:  
Procedure Laterality Date  HX  SECTION    
 HX OTHER SURGICAL    
 neck biopsy  HX TUBAL LIGATION SocialHx: 
Social History Social History  Marital status: SINGLE Spouse name: N/A  
 Number of children: N/A  
 Years of education: N/A Occupational History  Not on file. Social History Main Topics  Smoking status: Former Smoker Types: Cigarettes  Smokeless tobacco: Former User  Alcohol use Yes Comment: beer rarely  Drug use: No  
 Sexual activity: Not on file Other Topics Concern  Not on file Social History Narrative Allergies: 
No Known Allergies FamilyHx: 
Family History Problem Relation Age of Onset  Hypertension Mother  Diabetes Mother  Cancer Mother  Cancer Sister Prior to Admission Medications Prescriptions Last Dose Informant Patient Reported? Taking? MULTIVITAMIN PO   Yes No  
Sig: Take  by mouth. acetaminophen (TYLENOL) 325 mg tablet   No No  
Sig: Take 2 Tabs by mouth every four (4) hours as needed for Pain. acyclovir (ZOVIRAX) 400 mg tablet   Yes No  
Sig: Take 400 mg by mouth daily. budesonide-formoterol (SYMBICORT) 160-4.5 mcg/actuation HFAA   No No  
Sig: Take 2 Puffs by inhalation two (2) times a day. cyclobenzaprine (FLEXERIL) 5 mg tablet   No No  
Sig: Take 1 Tab by mouth three (3) times daily as needed for Muscle Spasm(s). ferrous sulfate 324 mg (65 mg iron) tablet   Yes No  
Sig: Take 324 mg by mouth Daily (before breakfast). labetalol (NORMODYNE) 200 mg tablet   Yes No  
Sig: Take 300 mg by mouth two (2) times a day. simvastatin (ZOCOR) 20 mg tablet   Yes No  
Sig: Take 20 mg by mouth nightly. venlafaxine-SR (EFFEXOR-XR) 150 mg capsule   Yes No  
Sig: Take  by mouth daily. Facility-Administered Medications: None Review of Systems: 
CONST: Fever, weight loss, fatigue or chills HEENT: Recent changes in vision, vertigo, epistaxis, dysphagia and hoarseness CV: Chest pain, palpitations, edema and varicosities RESP: Cough, shortness of breath, wheezing, hemoptysis, snoring and reactive airway disease GI: Nausea, vomiting, abdominal pain, change in bowel habits, hematochezia, melena, and GERD  
: Hematuria, dysuria, frequency, urgency, nocturia and stress urinary incontinence MS: Weakness, joint pain and arthritis ENDO: Polyuria, polydipsia, polyphagia, poor wound healing, heat intolerance, cold intolerance LYMPH/HEME: Anemia, bruising and history of blood transfusions INTEG: Dermatitis, abnormal moles, redness, pain NEURO: Dizziness, headache, fainting, seizures and stroke PSYCH: Anxiety and depression Objective Visit Vitals  /80  Pulse 90  Temp 99.2 °F (37.3 °C)  Resp 26  
 Ht 5' (1.524 m)  Wt 145.2 kg (320 lb)  SpO2 99%  BMI 62.5 kg/m2 Physical Exam: General Appearance: NAD, conversant HENT: normocephalic/atraumatic, moist mucus membranes Lungs: CTA with normal respiratory effort Cardiovascular: RRR, no m/r/g, capillary refill < 2 sec, B/L DP/PT pulses +3/4 Abdomen: soft, non-tender, normal bowel sounds Extremities: no cyanosis, B/L lymphedema, LLE erythema that appears to be present on entire extremity, several areas of indurated areas below the knee, two small scabs anterior shin no purulence Neuro: moves all extremities, no focal deficits Psych: appropriate affect, alert and oriented to person, place and time Laboratory Studies: 
BMP:  
Lab Results Component Value Date/Time  (L) 10/03/2018 12:23 PM  
 K 3.7 10/03/2018 12:23 PM  
 CL 96 (L) 10/03/2018 12:23 PM  
 CO2 24 10/03/2018 12:23 PM  
 AGAP 12 10/03/2018 12:23 PM  
  (H) 10/03/2018 12:23 PM  
 BUN 43 (H) 10/03/2018 12:23 PM  
 CREA 4.74 (H) 10/03/2018 12:23 PM  
 GFRAA 12 (L) 10/03/2018 12:23 PM  
 GFRNA 10 (L) 10/03/2018 12:23 PM  
 
CBC:  
Lab Results Component Value Date/Time WBC 34.0 (H) 10/03/2018 12:23 PM  
 HGB 7.9 (L) 10/03/2018 12:23 PM  
 HCT 25.3 (L) 10/03/2018 12:23 PM  
  10/03/2018 12:23 PM  
 
 
Imaging Reviewed: 
Ct Tib/fib Lt Wo Cont Result Date: 10/3/2018 Exam: CT left lower extremity without contrast INDICATION: Cellulitis of left lower extremity. Osteomyelitis suspected. COMPARISON: None TECHNIQUE: Noncontrast helical volumetric scanning was performed from just above the knee to just below the ankle. Axial, sagittal and coronal reconstructions were created. One or more dose reduction techniques were used on this CT: automated exposure control, adjustment of the mAs and/or kVp according to patient size, and iterative reconstruction techniques. The specific techniques used on this CT exam have been documented in the patient's electronic medical record. FINDINGS: Abundant subcutaneous fat is present. Diffuse subcutaneous fat stranding is present extending to the muscle fascia, mildly to moderately in the visualized distal lower upper leg, and proximal lower leg, becoming more prominent and quite extensive in the distal lower leg at and distal to the level of the ankle, particularly anteriorly and laterally. No organized fluid collections appreciated. The overlying skin thickening is present. No significant intermuscular low density within fat planes. Musculature appears similar in density throughout. No erosions or periosteal reaction. Subchondral cyst formation is present in the mid to lateral aspect of the femoral trochlea with small adjacent marginal osteophytes on both sides of the patellofemoral joint. No soft tissue gas. IMPRESSION: Extensive subcutaneous edema, while nonspecific, is suggestive of the given history of cellulitis. No walled off fluid collection to suggest abscess. No soft tissue gas. No evidence of osteomyelitis. Chondromalacia of the femoral trochlea. Assessment/Plan Active Hospital Problems Diagnosis Date Noted  Cellulitis 10/03/2018  Chronic anemia 07/28/2018  Sepsis ([facility] 07/28/2018  Acute on chronic renal failure ([facility] 09/02/2013 Baseline Cr 1.3, Cr of 3.79 with eGFR of 14 on admission.  CKD (chronic kidney disease) stage 3, GFR 30-59 ml/min ([facility]) 09/02/2013 Baseline Cr ~1.3, eGFR ~47, Nephrologist Dr. Saray Hyde.  Morbid obesity ([facility] 09/02/2013 BMI 42 with hypertension and type 2 DM.  Type 2 diabetes mellitus ([facility] 09/02/2013  Essential hypertension 09/02/2013  
 
- Cont broad spec IVAB w/ vanco (given hx of MRSA) and zosyn - Although suspicion is low for necrotizing fasciitis (imaging w/o suggestion), I am still a little concerned for development of this bc of the rapid spread of infection and alarming white count/bandemia, I will add clindamycin for the toxin-elaborating strains of streptococci and staphylococci - Will consult ID in AM given broad spectrum antibx regimen - Monitor extent of cellulitis daily - Trend CBC 
- Cont IVFs - Trend BMP, will consult nephro if no improvement 
- SSI and accuchecks - Cont acceptable home medications for chronic conditions  
- DVT protocol I have personally reviewed all pertinent labs, films and EKGs that have officially resulted. I reviewed available electronic documentation outlining the initial presentation as well as the emergency room physician's encounter. Vidal Reese, DO Internal Medicine, Hospitalist 
Pager: [phone] [facility]

## 2018-10-04 LAB
ANION GAP SERPL CALC-SCNC: 14 MMOL/L (ref 3–18)
BASOPHILS # BLD: 0 K/UL (ref 0–0.1)
BASOPHILS NFR BLD: 0 % (ref 0–2)
BUN SERPL-MCNC: 46 MG/DL (ref 7–18)
BUN/CREAT SERPL: 11 (ref 12–20)
CALCIUM SERPL-MCNC: 8.1 MG/DL (ref 8.5–10.1)
CHLORIDE SERPL-SCNC: 100 MMOL/L (ref 100–108)
CO2 SERPL-SCNC: 21 MMOL/L (ref 21–32)
CREAT SERPL-MCNC: 4.23 MG/DL (ref 0.6–1.3)
DIFFERENTIAL METHOD BLD: ABNORMAL
EOSINOPHIL # BLD: 0.1 K/UL (ref 0–0.4)
EOSINOPHIL NFR BLD: 0 % (ref 0–5)
ERYTHROCYTE [DISTWIDTH] IN BLOOD BY AUTOMATED COUNT: 16.8 % (ref 11.6–14.5)
GLUCOSE BLD STRIP.AUTO-MCNC: 101 MG/DL (ref 70–110)
GLUCOSE BLD STRIP.AUTO-MCNC: 116 MG/DL (ref 70–110)
GLUCOSE BLD STRIP.AUTO-MCNC: 120 MG/DL (ref 70–110)
GLUCOSE BLD STRIP.AUTO-MCNC: 128 MG/DL (ref 70–110)
GLUCOSE SERPL-MCNC: 75 MG/DL (ref 74–99)
HCT VFR BLD AUTO: 23.4 % (ref 35–45)
HGB BLD-MCNC: 7.3 G/DL (ref 12–16)
LYMPHOCYTES # BLD: 0.8 K/UL (ref 0.9–3.6)
LYMPHOCYTES NFR BLD: 3 % (ref 21–52)
MCH RBC QN AUTO: 20.3 PG (ref 24–34)
MCHC RBC AUTO-ENTMCNC: 31.2 G/DL (ref 31–37)
MCV RBC AUTO: 65 FL (ref 74–97)
MONOCYTES # BLD: 1.1 K/UL (ref 0.05–1.2)
MONOCYTES NFR BLD: 4 % (ref 3–10)
NEUTS SEG # BLD: 25.6 K/UL (ref 1.8–8)
NEUTS SEG NFR BLD: 93 % (ref 40–73)
PLATELET # BLD AUTO: 219 K/UL (ref 135–420)
POTASSIUM SERPL-SCNC: 3.7 MMOL/L (ref 3.5–5.5)
RBC # BLD AUTO: 3.6 M/UL (ref 4.2–5.3)
SODIUM SERPL-SCNC: 135 MMOL/L (ref 136–145)
VANCOMYCIN SERPL-MCNC: 17.1 UG/ML (ref 5–40)
WBC # BLD AUTO: 27.7 K/UL (ref 4.6–13.2)

## 2018-10-04 PROCEDURE — 82962 GLUCOSE BLOOD TEST: CPT

## 2018-10-04 PROCEDURE — 74011000250 HC RX REV CODE- 250: Performed by: HOSPITALIST

## 2018-10-04 PROCEDURE — 74011000258 HC RX REV CODE- 258: Performed by: HOSPITALIST

## 2018-10-04 PROCEDURE — 74011250636 HC RX REV CODE- 250/636: Performed by: HOSPITALIST

## 2018-10-04 PROCEDURE — 94640 AIRWAY INHALATION TREATMENT: CPT

## 2018-10-04 PROCEDURE — 74011250637 HC RX REV CODE- 250/637: Performed by: HOSPITALIST

## 2018-10-04 PROCEDURE — 65270000029 HC RM PRIVATE

## 2018-10-04 PROCEDURE — 85025 COMPLETE CBC W/AUTO DIFF WBC: CPT | Performed by: EMERGENCY MEDICINE

## 2018-10-04 PROCEDURE — 80202 ASSAY OF VANCOMYCIN: CPT | Performed by: HOSPITALIST

## 2018-10-04 PROCEDURE — 36415 COLL VENOUS BLD VENIPUNCTURE: CPT | Performed by: EMERGENCY MEDICINE

## 2018-10-04 PROCEDURE — 80048 BASIC METABOLIC PNL TOTAL CA: CPT | Performed by: EMERGENCY MEDICINE

## 2018-10-04 RX ADMIN — HEPARIN SODIUM 5000 UNITS: 5000 INJECTION INTRAVENOUS; SUBCUTANEOUS at 01:18

## 2018-10-04 RX ADMIN — MORPHINE SULFATE 2 MG: 4 INJECTION INTRAVENOUS at 01:19

## 2018-10-04 RX ADMIN — VENLAFAXINE HYDROCHLORIDE 150 MG: 75 CAPSULE, EXTENDED RELEASE ORAL at 10:15

## 2018-10-04 RX ADMIN — BUDESONIDE 500 MCG: 0.5 INHALANT RESPIRATORY (INHALATION) at 20:17

## 2018-10-04 RX ADMIN — SODIUM CHLORIDE 100 ML/HR: 900 INJECTION, SOLUTION INTRAVENOUS at 15:52

## 2018-10-04 RX ADMIN — HEPARIN SODIUM 5000 UNITS: 5000 INJECTION INTRAVENOUS; SUBCUTANEOUS at 10:18

## 2018-10-04 RX ADMIN — PIPERACILLIN SODIUM,TAZOBACTAM SODIUM 3.38 G: 3; .375 INJECTION, POWDER, FOR SOLUTION INTRAVENOUS at 10:54

## 2018-10-04 RX ADMIN — MORPHINE SULFATE 2 MG: 4 INJECTION INTRAVENOUS at 13:20

## 2018-10-04 RX ADMIN — ACYCLOVIR 400 MG: 800 TABLET ORAL at 10:17

## 2018-10-04 RX ADMIN — ARFORMOTEROL TARTRATE 15 MCG: 15 SOLUTION RESPIRATORY (INHALATION) at 20:17

## 2018-10-04 RX ADMIN — PIPERACILLIN SODIUM,TAZOBACTAM SODIUM 3.38 G: 3; .375 INJECTION, POWDER, FOR SOLUTION INTRAVENOUS at 01:18

## 2018-10-04 RX ADMIN — SIMVASTATIN 20 MG: 20 TABLET, FILM COATED ORAL at 21:09

## 2018-10-04 RX ADMIN — MORPHINE SULFATE 2 MG: 4 INJECTION INTRAVENOUS at 05:36

## 2018-10-04 RX ADMIN — MORPHINE SULFATE 2 MG: 4 INJECTION INTRAVENOUS at 17:06

## 2018-10-04 RX ADMIN — MORPHINE SULFATE 2 MG: 4 INJECTION INTRAVENOUS at 10:15

## 2018-10-04 RX ADMIN — CLINDAMYCIN PHOSPHATE 900 MG: 900 INJECTION, SOLUTION INTRAVENOUS at 18:10

## 2018-10-04 RX ADMIN — SODIUM CHLORIDE 100 ML/HR: 900 INJECTION, SOLUTION INTRAVENOUS at 03:12

## 2018-10-04 RX ADMIN — CLINDAMYCIN PHOSPHATE 900 MG: 900 INJECTION, SOLUTION INTRAVENOUS at 01:18

## 2018-10-04 RX ADMIN — LABETALOL HYDROCHLORIDE 300 MG: 100 TABLET, FILM COATED ORAL at 10:16

## 2018-10-04 RX ADMIN — LABETALOL HYDROCHLORIDE 300 MG: 100 TABLET, FILM COATED ORAL at 18:10

## 2018-10-04 RX ADMIN — FERROUS SULFATE TAB 325 MG (65 MG ELEMENTAL FE) 324 MG: 325 (65 FE) TAB at 10:18

## 2018-10-04 RX ADMIN — HEPARIN SODIUM 5000 UNITS: 5000 INJECTION INTRAVENOUS; SUBCUTANEOUS at 18:10

## 2018-10-04 RX ADMIN — Medication 10 ML: at 15:48

## 2018-10-04 RX ADMIN — MORPHINE SULFATE 2 MG: 4 INJECTION INTRAVENOUS at 21:10

## 2018-10-04 RX ADMIN — Medication 10 ML: at 22:00

## 2018-10-04 RX ADMIN — CLINDAMYCIN PHOSPHATE 900 MG: 900 INJECTION, SOLUTION INTRAVENOUS at 10:17

## 2018-10-04 RX ADMIN — PIPERACILLIN SODIUM,TAZOBACTAM SODIUM 3.38 G: 3; .375 INJECTION, POWDER, FOR SOLUTION INTRAVENOUS at 18:50

## 2018-10-04 NOTE — PROGRESS NOTES
Patient received in bed awake. Patient A&Ox4, denies pain. Noted left leg with edema; inflamed and elevated. No distress noted. Frequently use items within reach. Bed locked in low position. Call bell within reach and Patient verbalized understanding of use for assistance and needs. 1320- Pt made aware of order to DC higginbotham catheter, voiced understanding. Higginbotham dc 'd emptied 450 ml of yellow urine; no sediments noted. Pt tolerated well. Made aware of need of urinating w/in 4 to 6 hrs, voiced understanding.

## 2018-10-04 NOTE — PROGRESS NOTES
Prelim ID Consult (see Dictation [id number]) Patient: Sandhya Sidhu CSN: [id number] YOB: 1970  Age: 46 y.o. Sex: female Current abx Prior abx Vanco/zosyn/clinda 10/3-1 10/3 Ceftriaxone x 1 Assessment ->Rec:  
 
Sepsis poa- wbc 34k low grade fever joon d/t cellulitis ->monitor response- wbc sl lower, pt says pain and leg appears better this am  
->monitor bctx's LLE cellulitis- CT done in ER read as edema c/w cellulitis no gas abscess or OM  
-reports began 2 days PTA ? Insect bite with pus treated peroxide  
-tender L inguinal likely reactive lymphadenitis, faint tender erythema with indurated areas without drainage foot to above knee, no crepitance (+) DP, 3+ edema, R leg less edema (+) scarring  ->monitor on abx, streamline when cultures back  
->if drainage culture -suspect strep again or SA Chronic ble edema  ->diuresis, elevation H/o Gp G beta strep BSI from RLE cellulitis 7/28, with sepsis joon then- seen by Dr. Cheyenne Edwards, treated initially iv abx out 8/1 on 10 day course of keflex  ->monitor bctx's JOON on CKD3 Cr 4.7 ->4.2 today  ->dose abx for reduced eGFR 
->monitor renal function on combination abx   
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   
   
 
MICROBIOLOGY:  
10/3 bctx x 2 ngtd LINES AND CATHETERS:  
piv Thanks -- [facility] will follow with you JCSchwab, MD 
[facility] October 4, 2018 
[phone]'

## 2018-10-04 NOTE — PROGRESS NOTES
Problem: Falls - Risk of 
Goal: *Absence of Falls Document Juan Miguel Cordoba Fall Risk and appropriate interventions in the flowsheet. Outcome: Progressing Towards Goal 
Fall Risk Interventions: 
Mobility Interventions: Communicate number of staff needed for ambulation/transfer Medication Interventions: Patient to call before getting OOB Problem: Pressure Injury - Risk of 
Goal: *Prevention of pressure injury Document Aureliano Scale and appropriate interventions in the flowsheet. Outcome: Progressing Towards Goal 
Pressure Injury Interventions: Activity Interventions: Increase time out of bed Mobility Interventions: HOB 30 degrees or less Nutrition Interventions: Offer support with meals,snacks and hydration

## 2018-10-04 NOTE — PROGRESS NOTES
Problem: Discharge Planning Goal: *Discharge to safe environment Outcome: Progressing Towards Goal 
Home with hh

## 2018-10-04 NOTE — PROGRESS NOTES
Assume care of patient arriving to the floor from ED. Patient A&Ox4, patient verbalized pain at a 6 out of 10 but can wait for pain medication intervention. No distress noted. Frequently use items within reach. Bed locked in low position. Call bell within reach and patient verbalized understanding of use for assistance and needs. 1850- PRN Morphine 2 mg given for severe pain. 1905- Patient verbalized pain at a 8 out of 10 and is better.

## 2018-10-04 NOTE — PROGRESS NOTES
Reason for Admission:   cellulitis RRAT Score:     16 Do you (patient/family) have any concerns for transition/discharge? Plan for utilizing home health:     yes Likelihood of readmission?   low Transition of Care Plan:     Spoke with pt, lives with dtr Chase Ramirez. Independent with adls and amb. Disabled. No dme. Follow with a np brando tate at md office. She cannot recall name of md.  Plan home  to follow for hh needs. Patient has designated ____________son____________ to participate in his/her discharge plan and to receive any needed information. Name: Jenny Mindy Address: 
Phone number: [phone]

## 2018-10-04 NOTE — PROGRESS NOTES
Problem: Falls - Risk of 
Goal: *Absence of Falls Document Mayr Galvez Fall Risk and appropriate interventions in the flowsheet. Outcome: Progressing Towards Goal 
Fall Risk Interventions: 
Mobility Interventions: Communicate number of staff needed for ambulation/transfer Medication Interventions: Patient to call before getting OOB Problem: Pressure Injury - Risk of 
Goal: *Prevention of pressure injury Document Aureliano Scale and appropriate interventions in the flowsheet. Outcome: Progressing Towards Goal 
Pressure Injury Interventions: Activity Interventions: Increase time out of bed Mobility Interventions: HOB 30 degrees or less Nutrition Interventions: Offer support with meals,snacks and hydration

## 2018-10-04 NOTE — PROGRESS NOTES
Problem: Falls - Risk of 
Goal: *Absence of Falls Document Helena Alegria Fall Risk and appropriate interventions in the flowsheet. Outcome: Progressing Towards Goal 
Fall Risk Interventions: 
Mobility Interventions: Communicate number of staff needed for ambulation/transfer Medication Interventions: Patient to call before getting OOB Problem: Pressure Injury - Risk of 
Goal: *Prevention of pressure injury Document Aureliano Scale and appropriate interventions in the flowsheet. Outcome: Progressing Towards Goal 
Pressure Injury Interventions: Activity Interventions: Increase time out of bed Mobility Interventions: HOB 30 degrees or less Nutrition Interventions: Offer support with meals,snacks and hydration

## 2018-10-04 NOTE — ROUTINE PROCESS
Bedside and Verbal shift change report given to BRET Miller (oncoming nurse) by José Ho RN (offgoing nurse). Report included the following information SBAR, Kardex, Intake/Output, MAR and Recent Results.

## 2018-10-04 NOTE — PROGRESS NOTES
Medical Progress Note NAME: Amairani Copeland :  1970 MRM:  [id number] Date/Time: 10/4/2018  12:03 PM 
 
 
  
Subjective:  
 
Per Dr. Lior Delcid HPI \"Tasha Sanchez is a 46 y.o. female with a PMHx of DM-II-diet controlled, HTN, CKD III, HLD, morbid obesity, who presented to the ED with 2 days of LLE pain and redness. She has a history of cellulitis on both extremities. She denied any fever or chills on this presentation nor any SOB or CP. She denies any recent sick contacts. She admits to area of broken skin on the lower part of her leg that opened up and drained two days ago. She decided to come in for further evaluation due to worsening pain. In the ED, she was found to have leukocytosis > 30 and JOON on CKD. CT imaging was done wo contrast that did not report concern for necrotizing fasciitis. \" \"my pain is better. \" 
 
Past Medical History reviewed and unchanged from Admission History and Physical 
 
Review of Systems Constitutional: Negative. HENT: Negative. Eyes: Negative. Respiratory: Negative. Cardiovascular: Positive for leg swelling. Negative for chest pain and palpitations. Gastrointestinal: Negative. Genitourinary: Negative. Musculoskeletal: Negative. Skin: Positive for wound (lower ext). Neurological: Negative. Objective: WBC improving and renal markers improving. ID onboard. Continue IVF for now. Abx. Pain is better. Vitals:  
  
Last 24hrs VS reviewed since prior progress note. Most recent are: 
 
Visit Vitals  /66 (BP 1 Location: Left arm, BP Patient Position: At rest)  Pulse 91  Temp 98.1 °F (36.7 °C)  Resp 18  Ht 5' (1.524 m)  Wt 145.2 kg (320 lb)  SpO2 95%  BMI 62.5 kg/m2 SpO2 Readings from Last 6 Encounters:  
10/04/18 95% 18 96% 18 99% 18 95% 18 97% 17 97% Intake/Output Summary (Last 24 hours) at 10/04/18 0146 Last data filed at 10/04/18 6197 Gross per 24 hour Intake              120 ml Output             2500 ml Net            -2380 ml Exam:  
 
 Physical Exam  
Constitutional: She is oriented to person, place, and time. No distress. HENT:  
Head: Normocephalic. Eyes: Pupils are equal, round, and reactive to light. Neck: Normal range of motion. No JVD present. Cardiovascular: Normal rate and regular rhythm. Pulmonary/Chest: No stridor. No respiratory distress. Abdominal: Soft. Bowel sounds are normal. She exhibits no distension. There is no tenderness. Musculoskeletal: She exhibits edema (to bilateral lower ext more prevelant to left). Neurological: She is alert and oriented to person, place, and time. Skin: She is not diaphoretic. Increased erythema, induration to left lower ext Medications: 
Current Facility-Administered Medications Medication Dose Route Frequency  piperacillin-tazobactam (ZOSYN) 3.375 g in 0.9% sodium chloride (MBP/ADV) 100 mL MBP ## 4HR EXTENDED INFUSION##  3.375 g IntraVENous Q8H  
 sodium chloride (NS) flush 5-10 mL  5-10 mL IntraVENous PRN  
 0.9% sodium chloride infusion  100 mL/hr IntraVENous CONTINUOUS  
 labetalol (NORMODYNE) tablet 300 mg  300 mg Oral BID  simvastatin (ZOCOR) tablet 20 mg  20 mg Oral QHS  venlafaxine-SR (EFFEXOR-XR) capsule 150 mg  150 mg Oral DAILY WITH BREAKFAST  ferrous sulfate tablet 324 mg  324 mg Oral ACB  acyclovir (ZOVIRAX) tablet 400 mg  400 mg Oral DAILY  sodium chloride (NS) flush 5-10 mL  5-10 mL IntraVENous Q8H  
 sodium chloride (NS) flush 5-10 mL  5-10 mL IntraVENous PRN  
 acetaminophen (TYLENOL) tablet 650 mg  650 mg Oral Q4H PRN  
 morphine injection 2 mg  2 mg IntraVENous Q3H PRN  
 heparin (porcine) injection 5,000 Units  5,000 Units SubCUTAneous Q8H  
 insulin lispro (HUMALOG) injection   SubCUTAneous AC&HS  
 glucose chewable tablet 16 g  4 Tab Oral PRN  
  glucagon (GLUCAGEN) injection 1 mg  1 mg IntraMUSCular PRN  
 dextrose (D50) infusion 12.5-25 g  25-50 mL IntraVENous PRN  
 VANCOMYCIN INFORMATION NOTE   Other Rx Dosing/Monitoring  clindamycin (CLEOCIN) 900mg D5W 50mL IVPB (premix)  900 mg IntraVENous Q8H  
 budesonide (PULMICORT) 500 mcg/2 ml nebulizer suspension  500 mcg Nebulization BID RT  
 arformoterol (BROVANA) neb solution 15 mcg  15 mcg Nebulization BID RT  
 
 
______________________________________________________________________ Lab Review:  
 
Recent Labs 10/04/18 
 0505  10/03/18 
 1223 WBC  27.7*  34.0* HGB  7.3*  7.9*  
HCT  23.4*  25.3*  
PLT  219  226 Recent Labs 10/04/18 
 0505  10/03/18 
 1223 NA  135*  132* K  3.7  3.7 CL  100  96* CO2  21  24 GLU  75  118* BUN  46*  43* CREA  4.23*  4.74* CA  8.1*  8.4* ALB   --   2.6* SGOT   --   43* ALT   --   17 No components found for: [place] No results for input(s): PH, PCO2, PO2, HCO3, FIO2 in the last 72 hours. No results for input(s): INR in the last 72 hours. No lab exists for component: INREXT, INREXT Assessment:  
 
Principal Problem: 
  Sepsis ([facility] (7/28/2018) Active Problems: 
  Essential hypertension (9/2/2013) Type 2 diabetes mellitus ([facility] (9/2/2013) CKD (chronic kidney disease) stage 3, GFR 30-59 ml/min ([facility]) (9/2/2013) Overview: Baseline Cr ~1.3, eGFR ~47, Nephrologist Dr. Macario Marquez. Acute on chronic renal failure ([facility] (9/2/2013) Overview: Baseline Cr 1.3, Cr of 3.79 with eGFR of 14 on admission. Morbid obesity ([facility] (9/2/2013) Overview: BMI 42 with hypertension and type 2 DM. Chronic anemia (7/28/2018) Cellulitis (10/3/2018) Plan:  
 
Sepsis 2/2 LLE cellulitis 
-WBC improving afebrile 
-ID onboard 
-abx per ID 
-previous hx of cellulitis in past noting GP G beta strep 
-blood cultures pending -IVF JOON 
-slight improvement 
-monitor for now given trending down -I&O's 
-IVF  
 
DM II 
-corrective insulin DVT prophyalxis 
-heparin sq 
        
___________________________________________________ Attending Physician: Dodie Murray, NP

## 2018-10-04 NOTE — PROGRESS NOTES
2050: Bedside verbal shift report received from [address] [facility]. Pt is awake in bed, no signs of distress, instructed to press call light if assistance is needed, call light within reach. Pain control throughout the night 
 
0800: Bedside and Verbal shift change report given to Clair Marrufo RN (oncoming nurse) by Otila Schaumann, RN (offgoing nurse). Report included the following information SBAR, Kardex, Intake/Output, MAR and Recent Results.

## 2018-10-04 NOTE — PROGRESS NOTES
conducted a follow-up consultation and spiritual assessment for King Resendiz, who is a 46 y.o. female. Patient was dressed and sitting on the side of her bed, as she was waiting for her brother to come and take her home. There were no concerns expessed. The  provided the following interventions: 
Continued the relationship of care and support. Listened empathically. Offered assurance of continued prayer on patient's behalf. Chart reviewed. The following outcome was achieved: 
Patient expressed gratitude for the 's visit. Assessment: 
There are no spiritual or Gnosticist issues which require further Spiritual Care Services interventions at this time. [facility] 
Board Certified  [address] [facility]  
[phone]

## 2018-10-04 NOTE — PROGRESS NOTES
Pharmacy Dosing Services: Vancomycin Indication: Sepsis Day of therapy: 2 Other Antimicrobials (Include dose, start day & day of therapy): 
Clindamycin 900 mg iv q8h PipTazo 3.375g iv E. I. q8h Acyclovia 400 mg PO daily Loading dose (date given): 2000 mg 
Current Maintenance dose: none at this time Goal Vancomycin Level: 15-20 
(Trough 15-20 for most infections, 20 for meningitis/osteomyelitis, pre-HD level ~25) Vancomycin Level (if drawn):  
10/4 17.1 (25 hr post 1st dose only) Significant Cultures:  
Blood: negative. Pending. Renal function stable? (unstable defined as SCr increase of 0.5 mg/dL or > 50% increase from baseline, whichever is greater) (Y/N): N  
 
CAPD, Hemodialysis or Renal Replacement Therapy (Y/N): N Recent Labs 10/04/18 
 0505  10/03/18 
 1223 CREA  4.23*  4.74* BUN  46*  43* WBC  27.7*  34.0* Temp (24hrs), Av.2 °F (37.3 °C), Min:98.1 °F (36.7 °C), Max:100.2 °F (37.9 °C) Creatinine Clearance (Creatinine Clearance (ml/min)): 21.5 ml/min Regimen assessment: subtherapeutic early level as expected Maintenance dose: 750 mg q36h to be started at 0100, on 10/5 Next scheduled level: 10, 1230. May adjust dose on 10/5 if renal function continue to improve. Pharmacy will follow daily and adjust medications as appropriate for renal function and/or serum levels.  
 
Thank you, 
Mary Jo Pritchard, PHD

## 2018-10-05 LAB
ANION GAP SERPL CALC-SCNC: 14 MMOL/L (ref 3–18)
BUN SERPL-MCNC: 51 MG/DL (ref 7–18)
BUN/CREAT SERPL: 13 (ref 12–20)
CALCIUM SERPL-MCNC: 8.2 MG/DL (ref 8.5–10.1)
CHLORIDE SERPL-SCNC: 101 MMOL/L (ref 100–108)
CO2 SERPL-SCNC: 22 MMOL/L (ref 21–32)
CREAT SERPL-MCNC: 4 MG/DL (ref 0.6–1.3)
GLUCOSE BLD STRIP.AUTO-MCNC: 106 MG/DL (ref 70–110)
GLUCOSE BLD STRIP.AUTO-MCNC: 120 MG/DL (ref 70–110)
GLUCOSE BLD STRIP.AUTO-MCNC: 83 MG/DL (ref 70–110)
GLUCOSE BLD STRIP.AUTO-MCNC: 99 MG/DL (ref 70–110)
GLUCOSE SERPL-MCNC: 78 MG/DL (ref 74–99)
POTASSIUM SERPL-SCNC: 3.6 MMOL/L (ref 3.5–5.5)
SODIUM SERPL-SCNC: 137 MMOL/L (ref 136–145)

## 2018-10-05 PROCEDURE — 74011250636 HC RX REV CODE- 250/636: Performed by: INTERNAL MEDICINE

## 2018-10-05 PROCEDURE — 74011000250 HC RX REV CODE- 250: Performed by: HOSPITALIST

## 2018-10-05 PROCEDURE — 74011000258 HC RX REV CODE- 258: Performed by: HOSPITALIST

## 2018-10-05 PROCEDURE — 74011250636 HC RX REV CODE- 250/636: Performed by: HOSPITALIST

## 2018-10-05 PROCEDURE — 36415 COLL VENOUS BLD VENIPUNCTURE: CPT | Performed by: EMERGENCY MEDICINE

## 2018-10-05 PROCEDURE — 94640 AIRWAY INHALATION TREATMENT: CPT

## 2018-10-05 PROCEDURE — 97162 PT EVAL MOD COMPLEX 30 MIN: CPT

## 2018-10-05 PROCEDURE — 80048 BASIC METABOLIC PNL TOTAL CA: CPT | Performed by: EMERGENCY MEDICINE

## 2018-10-05 PROCEDURE — 82962 GLUCOSE BLOOD TEST: CPT

## 2018-10-05 PROCEDURE — 94760 N-INVAS EAR/PLS OXIMETRY 1: CPT

## 2018-10-05 PROCEDURE — 65270000029 HC RM PRIVATE

## 2018-10-05 PROCEDURE — 97116 GAIT TRAINING THERAPY: CPT

## 2018-10-05 PROCEDURE — 74011000258 HC RX REV CODE- 258: Performed by: INTERNAL MEDICINE

## 2018-10-05 PROCEDURE — 74011250637 HC RX REV CODE- 250/637: Performed by: HOSPITALIST

## 2018-10-05 RX ADMIN — SODIUM CHLORIDE 750 MG: 900 INJECTION, SOLUTION INTRAVENOUS at 00:07

## 2018-10-05 RX ADMIN — VENLAFAXINE HYDROCHLORIDE 150 MG: 75 CAPSULE, EXTENDED RELEASE ORAL at 10:02

## 2018-10-05 RX ADMIN — BUDESONIDE 500 MCG: 0.5 INHALANT RESPIRATORY (INHALATION) at 20:32

## 2018-10-05 RX ADMIN — PIPERACILLIN SODIUM,TAZOBACTAM SODIUM 3.38 G: 3; .375 INJECTION, POWDER, FOR SOLUTION INTRAVENOUS at 04:27

## 2018-10-05 RX ADMIN — Medication 10 ML: at 06:00

## 2018-10-05 RX ADMIN — ACYCLOVIR 400 MG: 800 TABLET ORAL at 10:02

## 2018-10-05 RX ADMIN — CLINDAMYCIN PHOSPHATE 900 MG: 900 INJECTION, SOLUTION INTRAVENOUS at 10:02

## 2018-10-05 RX ADMIN — CLINDAMYCIN PHOSPHATE 900 MG: 900 INJECTION, SOLUTION INTRAVENOUS at 02:48

## 2018-10-05 RX ADMIN — Medication 10 ML: at 14:00

## 2018-10-05 RX ADMIN — CEFEPIME HYDROCHLORIDE 1 G: 1 INJECTION, POWDER, FOR SOLUTION INTRAMUSCULAR; INTRAVENOUS at 22:45

## 2018-10-05 RX ADMIN — MORPHINE SULFATE 2 MG: 4 INJECTION INTRAVENOUS at 22:45

## 2018-10-05 RX ADMIN — LABETALOL HYDROCHLORIDE 300 MG: 100 TABLET, FILM COATED ORAL at 18:00

## 2018-10-05 RX ADMIN — ARFORMOTEROL TARTRATE 15 MCG: 15 SOLUTION RESPIRATORY (INHALATION) at 20:32

## 2018-10-05 RX ADMIN — LABETALOL HYDROCHLORIDE 300 MG: 100 TABLET, FILM COATED ORAL at 10:06

## 2018-10-05 RX ADMIN — MORPHINE SULFATE 2 MG: 4 INJECTION INTRAVENOUS at 04:27

## 2018-10-05 RX ADMIN — HEPARIN SODIUM 5000 UNITS: 5000 INJECTION INTRAVENOUS; SUBCUTANEOUS at 10:03

## 2018-10-05 RX ADMIN — BUDESONIDE 500 MCG: 0.5 INHALANT RESPIRATORY (INHALATION) at 10:03

## 2018-10-05 RX ADMIN — ARFORMOTEROL TARTRATE 15 MCG: 15 SOLUTION RESPIRATORY (INHALATION) at 10:02

## 2018-10-05 RX ADMIN — HEPARIN SODIUM 5000 UNITS: 5000 INJECTION INTRAVENOUS; SUBCUTANEOUS at 02:48

## 2018-10-05 RX ADMIN — FERROUS SULFATE TAB 325 MG (65 MG ELEMENTAL FE) 324 MG: 325 (65 FE) TAB at 10:06

## 2018-10-05 RX ADMIN — SODIUM CHLORIDE 100 ML/HR: 900 INJECTION, SOLUTION INTRAVENOUS at 02:48

## 2018-10-05 RX ADMIN — SODIUM CHLORIDE 100 ML/HR: 900 INJECTION, SOLUTION INTRAVENOUS at 19:29

## 2018-10-05 RX ADMIN — MORPHINE SULFATE 2 MG: 4 INJECTION INTRAVENOUS at 18:46

## 2018-10-05 RX ADMIN — MORPHINE SULFATE 2 MG: 4 INJECTION INTRAVENOUS at 00:07

## 2018-10-05 RX ADMIN — SIMVASTATIN 20 MG: 20 TABLET, FILM COATED ORAL at 22:45

## 2018-10-05 RX ADMIN — CLINDAMYCIN PHOSPHATE 900 MG: 900 INJECTION, SOLUTION INTRAVENOUS at 18:01

## 2018-10-05 RX ADMIN — HEPARIN SODIUM 5000 UNITS: 5000 INJECTION INTRAVENOUS; SUBCUTANEOUS at 18:01

## 2018-10-05 RX ADMIN — CEFEPIME HYDROCHLORIDE 1 G: 1 INJECTION, POWDER, FOR SOLUTION INTRAMUSCULAR; INTRAVENOUS at 12:30

## 2018-10-05 NOTE — PROGRESS NOTES
Problem: Falls - Risk of 
Goal: *Absence of Falls Document Dannial Shadow Fall Risk and appropriate interventions in the flowsheet. Outcome: Progressing Towards Goal 
Fall Risk Interventions: 
Mobility Interventions: Communicate number of staff needed for ambulation/transfer Medication Interventions: Patient to call before getting OOB Elimination Interventions: Call light in reach Problem: Pressure Injury - Risk of 
Goal: *Prevention of pressure injury Document Aureliano Scale and appropriate interventions in the flowsheet. Outcome: Progressing Towards Goal 
Pressure Injury Interventions: Activity Interventions: Pressure redistribution bed/mattress(bed type), PT/OT evaluation Mobility Interventions: HOB 30 degrees or less Nutrition Interventions: Document food/fluid/supplement intake

## 2018-10-05 NOTE — PROGRESS NOTES
Problem: Falls - Risk of 
Goal: *Absence of Falls Document [facility] Fall Risk and appropriate interventions in the flowsheet. Outcome: Progressing Towards Goal 
Fall Risk Interventions: 
Mobility Interventions: Communicate number of staff needed for ambulation/transfer Medication Interventions: Patient to call before getting OOB Elimination Interventions: Call light in reach Problem: Pressure Injury - Risk of 
Goal: *Prevention of pressure injury Document Aureliano Scale and appropriate interventions in the flowsheet. Outcome: Progressing Towards Goal 
Pressure Injury Interventions: Activity Interventions: Pressure redistribution bed/mattress(bed type), PT/OT evaluation Mobility Interventions: HOB 30 degrees or less Nutrition Interventions: Document food/fluid/supplement intake

## 2018-10-05 NOTE — PROGRESS NOTES
Problem: Falls - Risk of 
Goal: *Absence of Falls Document Toby Tarangos Fall Risk and appropriate interventions in the flowsheet. Outcome: Progressing Towards Goal 
Fall Risk Interventions: 
Mobility Interventions: Communicate number of staff needed for ambulation/transfer, Patient to call before getting OOB Medication Interventions: Patient to call before getting OOB Elimination Interventions: Call light in reach, Patient to call for help with toileting needs Problem: Pressure Injury - Risk of 
Goal: *Prevention of pressure injury Document Aureliano Scale and appropriate interventions in the flowsheet. Outcome: Progressing Towards Goal 
Pressure Injury Interventions: Activity Interventions: Pressure redistribution bed/mattress(bed type) Mobility Interventions: HOB 30 degrees or less, Pressure redistribution bed/mattress (bed type) Nutrition Interventions: Document food/fluid/supplement intake

## 2018-10-05 NOTE — PROGRESS NOTES
Patient received in bed awake. A&Ox4, denies pain and discomfort. Noted left leg with edema; redness; inflamed and elevated. No distress noted. Frequently use items within reach. Bed locked in low position. Call bell within reach and Patient verbalized understanding of use for assistance and needs.

## 2018-10-05 NOTE — PROGRESS NOTES
Assumed care of patient from Starlene Hodgkins, [address] (offgoing nurse). Patient awake in bed, alert and oriented x4, no complaint. Denies pain. Bed locked and in lowest position, call bell and frequently used items in reach. LLE elevated on two pillows. 1930-Bedside and Verbal shift change report given to BRET Miller  (oncoming nurse) by Alexander Gillespie (offgoing nurse). Report included the following information SBAR, Kardex, Intake/Output, MAR and Recent Results.

## 2018-10-05 NOTE — ROUTINE PROCESS
Bedside and Verbal shift change report given to Jose Tripp, RN (oncoming nurse) by Conchita Allen RN, BSN (offgoing nurse). Report given with SBAR, Kardex, Intake/Output, MAR and Recent Results.

## 2018-10-05 NOTE — PROGRESS NOTES
Nutrition initial assessment/Plan of care RECOMMENDATIONS:  
 
1. Cardiac diet 2. Monitor weight, labs and PO intake 3. RD to follow GOALS:  
 
1. PO intake meets >75% of protein/calorie needs by 10/12 2. Weight Maintenance/Gradual weight loss (1-2 lb by 10/12) ASSESSMENT:  
 
Weight status is classified as obese per BMI of 62.5. PO intake is adequate. Labs noted. Elevated BUN/Creatinine levels; GFR: 14. Patient with hypoalbuminemia and hypocalcemia. Nutrition recommendations listed. RD to follow. Nutrition Diagnoses:  
Obesity related to excessive energy intake as evidenced by BMI of 62.5. Nutrition Risk:  [] High  [] Moderate [x]  Low SUBJECTIVE/OBJECTIVE:  
  
Admitted with cellulitis of lower left leg and sepsis. Patient reports having a good appetite and eating most of meals. Observed 75% intake of lunch meal. Denies food allergies and problems with chewing/swallowing. States weight is variable but usually between 320-330 lb. Will monitor. Information Obtained from:  
 [x] Chart Review [x] Patient 
 [] Family/Caregiver 
 [] Nurse/Physician 
 [] Interdisciplinary Meeting/Rounds Diet: Cardiac diet Medications: [x] Reviewed (IVF: NS at 100 ml/hr) Allergies: [x] Reviewed Encounter Diagnoses ICD-10-CM ICD-9-CM 1. JOON (acute kidney injury) ([facility] N17.9 584.9 2. Cellulitis of left lower leg L03.116 682.6 Past Medical History:  
Diagnosis Date  Anemia  Bone lesion  Chronic kidney disease  Depressed  Diabetes ([facility]  Diabetes mellitus ([facility]  Hypertension  Kidney problem  Pneumonia  Sarcoidosis  Thyroid disorder  Uterus problem Labs:  Lab Results Component Value Date/Time  Sodium 137 10/05/2018 04:05 AM  
 Potassium 3.6 10/05/2018 04:05 AM  
 Chloride 101 10/05/2018 04:05 AM  
 CO2 22 10/05/2018 04:05 AM  
 Anion gap 14 10/05/2018 04:05 AM  
 Glucose 78 10/05/2018 04:05 AM  
 BUN 51 (H) 10/05/2018 04:05 AM  
 Creatinine 4.00 (H) 10/05/2018 04:05 AM  
 Calcium 8.2 (L) 10/05/2018 04:05 AM  
 Magnesium 2.2 08/26/2017 03:31 PM  
 Phosphorus 4.6 01/09/2014 04:45 PM  
 Albumin 2.6 (L) 10/03/2018 12:23 PM  
 
Anthropometrics: BMI (calculated): 62.5 Last 3 Recorded Weights in this Encounter 10/03/18 1158 Weight: 145.2 kg (320 lb) Ht Readings from Last 1 Encounters:  
10/03/18 5' (1.524 m) Weight History:  
 
Weight Metrics 10/3/2018 8/29/2018 8/1/2018 5/14/2018 3/20/2018 1/22/2018 11/6/2017 Weight 320 lb 323 lb 338 lb 340 lb 349 lb 362 lb 357 lb BMI 62.5 kg/m2 55.44 kg/m2 56.25 kg/m2 60.23 kg/m2 56.33 kg/m2 58.43 kg/m2 57.62 kg/m2 Patient Vitals for the past 100 hrs: 
 % Diet Eaten 10/04/18 0923 85 % IBW: 100 lb %IBW: 219% Estimated Nutrition Needs: [x] MSJ Calories: 2400-4260 Kcal Based on:   [x] Actual BW   
Protein:   116 g Based on:   [x] Actual BW Fluid:       0160-3111 ml Based on:   [x] Actual BW  
 
 [x] No Cultural, Worship or ethnic dietary need identified. [] Cultural, Worship and ethnic food preferences identified and addressed Wt Status:  [] Normal (18.6 - 24.9) [] Underweight (<18.5) [] Overweight (25 - 29.9) [] Mild Obesity (30 - 34.9)  [] Moderate Obesity (35 - 39.9) [x] Morbid Obesity (40+) Nutrition Problems Identified:  
[] Suboptimal PO intake  
[] Food Allergies [] Difficulty chewing/swallowing/poor dentition 
[] Constipation/Diarrhea  
[] Nausea/Vomiting  
[] None 
[x] Other: Obesity/Excessive energy intake Plan:  
[x] Therapeutic Diet 
[]  Obtained/adjusted food preferences/tolerances and/or snacks options  
[]  Supplements added  
[] Occupational therapy following for feeding techniques []  HS snack added  
[]  Modify diet texture  
[]  Modify diet for food allergies []  Assist with menu selection  
[x]  Monitor PO intake on meal rounds  
[x]  Continue inpatient monitoring and intervention []  Participated in discharge planning/Interdisciplinary rounds/Team meetings  
[]  Other:  
 
Education Needs: 
 [] Not appropriate for teaching at this time due to: 
 [x] Identified and addressed Nutrition Monitoring and Evaluation: 
[x] Continue ongoing monitoring and intervention 
[] Other [address]

## 2018-10-05 NOTE — PROGRESS NOTES
2005: Assumed patient care. Received report from Nestor Srivastava [facility] (offgoing nurse). Report included SBAR, Kardex, and MAR. Patient resting in bed, in no signs of pain or distress. Call light and possessions within reach, bed in lowest setting. 2110: Morphine given for pain 2205: Patient states pain continues to be a 6/10, recently ambulated to bathroom 0007: Morphine given for pain 0107: Patient sleeping 
  
0427: Morphine given for pain 0518: Patient states pain is feeling better, still rates as 6, yet patient states it is a more managable type of pain describing it as tightness rather than soreness.

## 2018-10-05 NOTE — CDMP QUERY
This patient has been diagnosed with Sepsis. Please document in the progress notes the Clinical Indicators and any associated Acute Organ Dysfunction that supports this diagnosis or state that the diagnosis has been ruled out. Current documentation:wbc 34 with left shift. Temp was only 99.2. LLE cellulitis and JOON . Sepsis  (BSV Approved definition) Documented Source of suspected or confirmed infection as manifested by 2 or more of the following, not easily explained by another co-existing condition:  Temperature:  >38C (100.9F)   -OR-  <36C  (96.8F)  Heart Rate:  > 90 bpm 
 Respiratory Rate:  > 20 / min  -OR-  PaCO2 < 32 
 WBC:  > 12K  -OR- < 4K  -OR- Bands > 10 Explicitly link all related/associated Acute Organ Dysfunction to Sepsis:     
 Encephalopathy  Sepsis-induced Hypotension (or)  Septic Shock         [SBP < 90 (or) MAP <65 (or) SBP drop > 40 points from baseline]  Hypoxemia (or)  Acute Respiratory Failure         [need for invasive or non-invasive ventilation OR- pO2 < 60 mmHg]  Acute Kidney Injury (or) Acute Renal Failure OR- Oliguria         [serum Creatinine > 2.0 OR- Urine Output < 0.5ml/kg/hr for 2 hours]  Ileus (absent bowel sounds)  Coagulopathy  DIC  Thrombocytopenia         [Platelet Count < 542,910 OR- INR > 1.5 OR- aPTT >60 sec]  Hyperbilirubinemia     [Bilirubin > 2 mg/dL]  Hyperlactatemia   [Lactic Acid > 2.0]  Critical-Illness Myopathy or Polyneuropathy Severe Sepsis = Sepsis with associated Acute Organ Dysfunction Septic Shock = Refractory hypotension refractory to aggressive volume replacement and often requiring vasopressor therapy like dopamine  -OR-  Lactic Acidosis  [Lactic Acid > 4.0]. Thank You Ree Bennett RN [facility] [phone]

## 2018-10-05 NOTE — PROGRESS NOTES
Problem: Mobility Impaired (Adult and Pediatric) Goal: *Acute Goals and Plan of Care (Insert Text) Physical Therapy Goals Initiated 10/5/2018 and to be accomplished within 7 day(s) 1. Patient will move from supine to sit and sit to supine in bed with modified independence. 2.  Patient will transfer from bed to chair and chair to bed with modified independence using the least restrictive device. 3.  Patient will perform sit to stand with modified independence. 4.  Patient will ambulate with modified independence for 100 feet with the least restrictive device. 5.  Patient will ascend/descend 6 stairs with handrail(s) with modified independence. Outcome: Progressing Towards Goal 
PHYSICAL THERAPY: Initial Assessment INPATIENT: Medicaid: Hospital Day: 3 Patient: Stephanie Valladares (89 y.o. female)    Date: 10/5/2018 Primary Diagnosis: Cellulitis Precautions: Fall, Skin PLOF: independent ASSESSMENT : 
Upon meeting, patient was resting supine in bed. Patient was cooperative to PT. Patient requires between supervision/set-up and modified independence for bed mobility, transfers and ambulation. Patient mobility is primarily limited by tenderness and soreness in L LE. Patient was modified independent in bed mobility and coming to sitting at edge of bed. After donning bariatric non-skid socks, patient was modified independent in ambulating 30 feet x 2 to doorway and back to sitting at edge of bed. Patient exhibits step-to gait pattern due to tenderness of WB in L LE. Patient requested to go to bathroom and ambulated 30 feet x 2 to bathroom and back. Patient was modified independent in lowering to toilet in bathroom. Patient demonstrated good static standing balance during evaluation and at bathroom sink. Patient will benefit from PT in order to improve dynamic standing balance when patient is able to increase WB on L LE once her medical condition has resolved. BRET Castro notified. Patient presents with deficits in: 
Transfers, Gait, Strength, Range of Motion, Balance, Stairs, Precautions and Skin Integrity/Hygiene Patient will benefit from skilled intervention to address the above impairments. Patients rehabilitation potential is considered to be Good Factors which may influence rehabilitation potential include:  
[]         None noted 
[]         Mental ability/status [x]         Medical condition 
[x]         Home/family situation and support systems 
[]         Safety awareness [x]         Pain tolerance/management 
[]         Other: PLAN : 
Recommendations and Planned Interventions: 
[x]           Bed Mobility Training             [x]    Neuromuscular Re-Education 
[x]           Transfer Training                   []    Orthotic/Prosthetic Training 
[x]           Gait Training                          [x]    Modalities [x]           Therapeutic Exercises          []    Edema Management/Control 
[x]           Therapeutic Activities            [x]    Patient and Family Training/Education 
[]           Other (comment): EDUCATION:  
Education:  Patient was educated on the following topics: Bed mobility, transfers, ADLs, balance, amb, safety, exercise, role of PT, plan of care, cognition, skin integrity, vitals Barriers to Learning/Limitations: None Compensate with: visual, verbal, tactile, kinesthetic cues/model Recommendations for the next treatment session: address functional mobility impairments through transfer training, gait training, stairs assessment, and therapeutic exercises and activities Frequency/Duration: Patient will be followed by physical therapy 3-5 times a week to address goals. Discharge Recommendations: Home Further Equipment Recommendations for Discharge: None Factors which may impact discharge planning: medical status of LLE SUBJECTIVE:  
Patient stated It's tender but much better than it was before.  OBJECTIVE DATA SUMMARY:  
 
 Past Medical History:  
Diagnosis Date  Anemia  Bone lesion  Chronic kidney disease  Depressed  Diabetes ([facility]  Diabetes mellitus ([facility]  Hypertension  Kidney problem  Pneumonia  Sarcoidosis  Thyroid disorder  Uterus problem Past Surgical History:  
Procedure Laterality Date  HX  SECTION    
 HX OTHER SURGICAL    
 neck biopsy  HX TUBAL LIGATION Eval Complexity: History: MEDIUM  Complexity : 1-2 comorbidities / personal factors will impact the outcome/ POC Exam:MEDIUM Complexity : 3 Standardized tests and measures addressing body structure, function, activity limitation and / or participation in recreation  Presentation: MEDIUM Complexity : Evolving with changing characteristics  Clinical Decision Making:Medium Complexity [facility] Standing Balance Scale, Clinical Judgement Overall Complexity:MEDIUM 
 
G CODES:Mobility [id number] Current  CI= 1-19%   Goal  CI= 1-19%. The severity rating is based on the Other [facility] Balance Scale, Clinical Judgement [address] [place] [address] Standing Balance Scale 4+/5 
0: Pt performs 25% or less of standing activity (Max assist) CN, 100% impaired. 1: Pt supports self with upper extremities but requires therapist assistance. Pt performs 25-50% of effort (Mod assist) CM, 80% to <100% impaired. 1+: Pt supports self with upper extremities but requires therapist assistance. Pt performs >50% effort. (Min assist). CL, 60% to <80% impaired. 2: Pt supports self independently with both upper extremities (walker, crutches, parallel bars). CL, 60% to <80% impaired. 2+: Pt support self independently with 1 upper extremity (cane, crutch, 1 parallel bar). CK, 40% to <60% impaired. 3: Pt stands without upper extremity support for up to 30 seconds. CK, 40% to <60% impaired. 3+: Pt stands without upper extremity support for 30 seconds or greater. CJ, 20% to <40% impaired. 4: Pt independently moves and returns center of gravity 1-2 inches in one plane. CJ, 20% to <40% impaired. 4+: Pt independently moves and returns center of gravity 1-2 inches in multiple planes. CI, 1% to <20% impaired. 5: Pt independently moves and returns center of gravity in all planes greater than 2 inches. CH, 0% impaired. Prior Level of Function/Home Situation:  
Home Situation Home Environment: Apartment # Steps to Enter: 6 Rails to Enter: No 
One/Two Story Residence: One story Living Alone: No 
Support Systems: Family member(s) Patient Expects to be Discharged to[de-identified] [facility] Current DME Used/Available at Home: None Critical Behavior: 
Neurologic State: Alert Orientation Level: Oriented X4 Cognition: Appropriate decision making; Appropriate safety awareness Manual Muscle Testing (LE) not formally tested, observed through functional mobility R     L Hip Flexion:   5/5 5/5 Knee EXT:   5/5 5/5 Knee FLEX:   5/5 5/5 Ankle DF:   5/5 5/5 
_________________________________________________ [facility] Range Of Motion: [facility] Functional Mobility: 
 
 
Functional Status Indep (I) Mod I Super-vision Min A Mod A Max A Total A Assist x2 Verbal cues Additional time Not tested Comments Rolling []  [x]  [] []    []    []  []  [] [] [] [] Supine to sit []  [x]  [] []  []  []  []  [] [] [] [] Sit to supine []  [x]  [] []  []  []  []  [] [] [] [] Sit to stand []  [x]  [] []  []  []  []  [] [] [] []   
Stand to sit []  [x]  [] []  []  []  []  [] [] [] [] Bed to chair transfers []  []  [] []  []  []  []  [] [] [] [x] Balance Good Elnita Forth Poor Unable Not tested Comments Sitting static [x]  []  []  []  [] Sitting dynamic [x]  []  []  []  []   
Standing static [x]  []  []  []  []   
Standing dynamic []  [x]  []  []  [] Mobility/Gait:  
Level of Assistance: Supervision Assistive Device: IV pole Distance Ambulated: 30 feet x 2, 30 feet x 2 Left Lower Extremity: FWB Right Lower Extremity: FWB Base of Support: shift to right Speed/Evelyn: pace decreased (<100 feet/min) and slow Step Length: left shortened and right shortened Swing Pattern: left asymmetrical and right asymmetrical 
Stance: left decreased, right increased and weight shift Gait Abnormalities: step to gait Pain: 
Pre treatment pain level: 0/10 Post treatment pain level: 0/10 Activity Tolerance:  
Good Please refer to the flowsheet for vital signs taken during this treatment. After treatment:  
[]         Patient left in no apparent distress sitting up in chair 
[x]         Patient left in no apparent distress in bed 
[x]         Call bell left within reach [x]         Nursing notified 
[]         Caregiver present 
[]         Bed alarm activated COMMUNICATION/EDUCATION:  
[x]         Fall prevention education was provided and the patient/caregiver indicated understanding. [x]         Patient/family have participated as able in goal setting and plan of care. [x]         Patient/family agree to work toward stated goals and plan of care. []         Patient understands intent and goals of therapy, but is neutral about his/her participation. []         Patient is unable to participate in goal setting and plan of care. Thank you for this referral. 
Lety Fabian, SPT Time Calculation: 24 mins

## 2018-10-05 NOTE — PROGRESS NOTES
Bedside and Verbal shift change report given to Myles Zaldivar RN (oncoming nurse) by Rachel Mccarthy RN (offgoing nurse). Report included the following information SBAR, Kardex and MAR.

## 2018-10-06 LAB
ANION GAP SERPL CALC-SCNC: 14 MMOL/L (ref 3–18)
BASOPHILS # BLD: 0 K/UL (ref 0–0.1)
BASOPHILS NFR BLD: 0 % (ref 0–2)
BUN SERPL-MCNC: 47 MG/DL (ref 7–18)
BUN/CREAT SERPL: 14 (ref 12–20)
CALCIUM SERPL-MCNC: 8.3 MG/DL (ref 8.5–10.1)
CHLORIDE SERPL-SCNC: 104 MMOL/L (ref 100–108)
CO2 SERPL-SCNC: 22 MMOL/L (ref 21–32)
CREAT SERPL-MCNC: 3.47 MG/DL (ref 0.6–1.3)
DATE LAST DOSE: NORMAL
DIFFERENTIAL METHOD BLD: ABNORMAL
EOSINOPHIL # BLD: 0.2 K/UL (ref 0–0.4)
EOSINOPHIL NFR BLD: 2 % (ref 0–5)
ERYTHROCYTE [DISTWIDTH] IN BLOOD BY AUTOMATED COUNT: 16.9 % (ref 11.6–14.5)
GLUCOSE BLD STRIP.AUTO-MCNC: 100 MG/DL (ref 70–110)
GLUCOSE BLD STRIP.AUTO-MCNC: 85 MG/DL (ref 70–110)
GLUCOSE SERPL-MCNC: 75 MG/DL (ref 74–99)
HCT VFR BLD AUTO: 22.4 % (ref 35–45)
HGB BLD-MCNC: 7 G/DL (ref 12–16)
LYMPHOCYTES # BLD: 1.1 K/UL (ref 0.9–3.6)
LYMPHOCYTES NFR BLD: 7 % (ref 21–52)
MCH RBC QN AUTO: 20.3 PG (ref 24–34)
MCHC RBC AUTO-ENTMCNC: 31.3 G/DL (ref 31–37)
MCV RBC AUTO: 64.9 FL (ref 74–97)
MONOCYTES # BLD: 1.2 K/UL (ref 0.05–1.2)
MONOCYTES NFR BLD: 8 % (ref 3–10)
NEUTS SEG # BLD: 12.8 K/UL (ref 1.8–8)
NEUTS SEG NFR BLD: 83 % (ref 40–73)
PLATELET # BLD AUTO: 209 K/UL (ref 135–420)
POTASSIUM SERPL-SCNC: 3.5 MMOL/L (ref 3.5–5.5)
RBC # BLD AUTO: 3.45 M/UL (ref 4.2–5.3)
REPORTED DOSE,DOSE: NORMAL UNITS
REPORTED DOSE/TIME,TMG: 100
SODIUM SERPL-SCNC: 140 MMOL/L (ref 136–145)
VANCOMYCIN TROUGH SERPL-MCNC: 12.8 UG/ML (ref 10–20)
WBC # BLD AUTO: 15.2 K/UL (ref 4.6–13.2)

## 2018-10-06 PROCEDURE — 94640 AIRWAY INHALATION TREATMENT: CPT

## 2018-10-06 PROCEDURE — 74011250636 HC RX REV CODE- 250/636: Performed by: INTERNAL MEDICINE

## 2018-10-06 PROCEDURE — 36415 COLL VENOUS BLD VENIPUNCTURE: CPT | Performed by: EMERGENCY MEDICINE

## 2018-10-06 PROCEDURE — 74011250636 HC RX REV CODE- 250/636: Performed by: HOSPITALIST

## 2018-10-06 PROCEDURE — 94664 DEMO&/EVAL PT USE INHALER: CPT

## 2018-10-06 PROCEDURE — 74011250637 HC RX REV CODE- 250/637: Performed by: HOSPITALIST

## 2018-10-06 PROCEDURE — 74011000250 HC RX REV CODE- 250: Performed by: HOSPITALIST

## 2018-10-06 PROCEDURE — 80202 ASSAY OF VANCOMYCIN: CPT | Performed by: INTERNAL MEDICINE

## 2018-10-06 PROCEDURE — 85025 COMPLETE CBC W/AUTO DIFF WBC: CPT | Performed by: EMERGENCY MEDICINE

## 2018-10-06 PROCEDURE — 74011000258 HC RX REV CODE- 258: Performed by: INTERNAL MEDICINE

## 2018-10-06 PROCEDURE — 82962 GLUCOSE BLOOD TEST: CPT

## 2018-10-06 PROCEDURE — 65270000029 HC RM PRIVATE

## 2018-10-06 PROCEDURE — 80048 BASIC METABOLIC PNL TOTAL CA: CPT | Performed by: EMERGENCY MEDICINE

## 2018-10-06 RX ADMIN — SIMVASTATIN 20 MG: 20 TABLET, FILM COATED ORAL at 21:23

## 2018-10-06 RX ADMIN — ARFORMOTEROL TARTRATE 15 MCG: 15 SOLUTION RESPIRATORY (INHALATION) at 21:23

## 2018-10-06 RX ADMIN — LABETALOL HYDROCHLORIDE 300 MG: 100 TABLET, FILM COATED ORAL at 10:25

## 2018-10-06 RX ADMIN — VENLAFAXINE HYDROCHLORIDE 150 MG: 75 CAPSULE, EXTENDED RELEASE ORAL at 10:25

## 2018-10-06 RX ADMIN — SODIUM CHLORIDE 750 MG: 900 INJECTION, SOLUTION INTRAVENOUS at 15:03

## 2018-10-06 RX ADMIN — ACYCLOVIR 400 MG: 800 TABLET ORAL at 10:21

## 2018-10-06 RX ADMIN — CEFEPIME HYDROCHLORIDE 1 G: 1 INJECTION, POWDER, FOR SOLUTION INTRAMUSCULAR; INTRAVENOUS at 11:45

## 2018-10-06 RX ADMIN — ARFORMOTEROL TARTRATE 15 MCG: 15 SOLUTION RESPIRATORY (INHALATION) at 08:18

## 2018-10-06 RX ADMIN — MORPHINE SULFATE 2 MG: 4 INJECTION INTRAVENOUS at 18:20

## 2018-10-06 RX ADMIN — BUDESONIDE 500 MCG: 0.5 INHALANT RESPIRATORY (INHALATION) at 21:23

## 2018-10-06 RX ADMIN — CLINDAMYCIN PHOSPHATE 900 MG: 900 INJECTION, SOLUTION INTRAVENOUS at 18:07

## 2018-10-06 RX ADMIN — FERROUS SULFATE TAB 325 MG (65 MG ELEMENTAL FE) 324 MG: 325 (65 FE) TAB at 07:30

## 2018-10-06 RX ADMIN — HEPARIN SODIUM 5000 UNITS: 5000 INJECTION INTRAVENOUS; SUBCUTANEOUS at 02:06

## 2018-10-06 RX ADMIN — CLINDAMYCIN PHOSPHATE 900 MG: 900 INJECTION, SOLUTION INTRAVENOUS at 10:27

## 2018-10-06 RX ADMIN — LABETALOL HYDROCHLORIDE 300 MG: 100 TABLET, FILM COATED ORAL at 18:04

## 2018-10-06 RX ADMIN — MORPHINE SULFATE 2 MG: 4 INJECTION INTRAVENOUS at 21:24

## 2018-10-06 RX ADMIN — MORPHINE SULFATE 2 MG: 4 INJECTION INTRAVENOUS at 10:16

## 2018-10-06 RX ADMIN — BUDESONIDE 500 MCG: 0.5 INHALANT RESPIRATORY (INHALATION) at 08:18

## 2018-10-06 RX ADMIN — CLINDAMYCIN PHOSPHATE 900 MG: 900 INJECTION, SOLUTION INTRAVENOUS at 02:06

## 2018-10-06 RX ADMIN — HEPARIN SODIUM 5000 UNITS: 5000 INJECTION INTRAVENOUS; SUBCUTANEOUS at 10:00

## 2018-10-06 RX ADMIN — SODIUM CHLORIDE 100 ML/HR: 900 INJECTION, SOLUTION INTRAVENOUS at 21:25

## 2018-10-06 RX ADMIN — SODIUM CHLORIDE 100 ML/HR: 900 INJECTION, SOLUTION INTRAVENOUS at 08:10

## 2018-10-06 RX ADMIN — HEPARIN SODIUM 5000 UNITS: 5000 INJECTION INTRAVENOUS; SUBCUTANEOUS at 18:05

## 2018-10-06 NOTE — PROGRESS NOTES
1920: Bedside verbal shift report received from [place], [facility]. Pt is awake in bed, no signs of distress, instructed to press call light if assistance is needed, call light within reach. 2245: Pt was given morphine 2mg for leg pain. 4729: Bedside and Verbal shift change report given to Shelly Childress RN (oncoming nurse) by Phillip Charlton RN (offgoing nurse). Report included the following information SBAR, Kardex, Intake/Output, MAR and Recent Results.

## 2018-10-06 NOTE — PROGRESS NOTES
Remote note. Asked to check over weekend. Chart reviewed. Notes and labs reviewed. Pt afebrile. WBC better. No new cx data. Continue current BSAbx for now. Please call me at [phone] if you have any question or concern. Juan Lugo MD 
Infectious Disease

## 2018-10-06 NOTE — PROGRESS NOTES
Problem: Falls - Risk of 
Goal: *Absence of Falls Document Mateo Syed Fall Risk and appropriate interventions in the flowsheet. Outcome: Progressing Towards Goal 
Fall Risk Interventions: 
Mobility Interventions: Communicate number of staff needed for ambulation/transfer, Patient to call before getting OOB Medication Interventions: Patient to call before getting OOB Elimination Interventions: Call light in reach, Patient to call for help with toileting needs Problem: Pressure Injury - Risk of 
Goal: *Prevention of pressure injury Document Aureliano Scale and appropriate interventions in the flowsheet. Outcome: Progressing Towards Goal 
Pressure Injury Interventions: Activity Interventions: Pressure redistribution bed/mattress(bed type) Mobility Interventions: HOB 30 degrees or less, Pressure redistribution bed/mattress (bed type) Nutrition Interventions: Document food/fluid/supplement intake

## 2018-10-06 NOTE — PROGRESS NOTES
Internal Medicine Progress Note Patient's Name: Kory Mccallum Admit Date: 10/3/2018 Length of Stay: 3 Assessment/Plan Active Hospital Problems Diagnosis Date Noted  Cellulitis 10/03/2018  Chronic anemia 07/28/2018  Acute on chronic renal failure ([facility] 09/02/2013 Baseline Cr 1.3, Cr of 3.79 with eGFR of 14 on admission.  CKD (chronic kidney disease) stage 3, GFR 30-59 ml/min ([facility]) 09/02/2013 Baseline Cr ~1.3, eGFR ~47, Nephrologist Dr. Avi Lauren.  Morbid obesity ([facility] 09/02/2013 BMI 42 with hypertension and type 2 DM.  Type 2 diabetes mellitus ([facility] 09/02/2013  Essential hypertension 09/02/2013  
 
- Pt did not have sepsis - Cont IVAB w/ vanco/clinda and cefepime - F/u ID 
- Remains afebrile - Keep leg elevated - Trend CBC 
- PT/OT 
- Cont acceptable home medications for chronic conditions  
- DVT protocol I have personally reviewed all pertinent labs and films that have officially resulted over the last 24 hours. I have personally checked for all pending labs that are awaiting final results. Subjective Pt s/e @ bedside No major events overnight Believe redness is still improving Says the raised areas on tender at times Denies CP or SOB Objective Visit Vitals  BP (!) 158/91 (BP 1 Location: Right arm, BP Patient Position: Sitting)  Pulse 82  Temp 98.4 °F (36.9 °C)  Resp 16  
 Ht 5' (1.524 m)  Wt 145.2 kg (320 lb)  SpO2 94%  Breastfeeding No  
 BMI 62.5 kg/m2 Physical Exam: 
General Appearance: NAD, conversant Lungs: CTA with normal respiratory effort CV: RRR, no m/r/g Abdomen: soft, non-tender, normal bowel sounds Extremities: no cyanosis, B/L lymphedema, LLE erythema that has improved now to just below the knee, several areas of indurated areas below the knee still present and red, two small scabs anterior shin no purulence Neuro: No focal deficits, motor/sensory intact Lab/Data Reviewed: 
BMP:  
 Lab Results Component Value Date/Time  10/06/2018 04:24 AM  
 K 3.5 10/06/2018 04:24 AM  
  10/06/2018 04:24 AM  
 CO2 22 10/06/2018 04:24 AM  
 AGAP 14 10/06/2018 04:24 AM  
 GLU 75 10/06/2018 04:24 AM  
 BUN 47 (H) 10/06/2018 04:24 AM  
 CREA 3.47 (H) 10/06/2018 04:24 AM  
 GFRAA 17 (L) 10/06/2018 04:24 AM  
 GFRNA 14 (L) 10/06/2018 04:24 AM  
 
CBC:  
Lab Results Component Value Date/Time WBC 15.2 (H) 10/06/2018 04:24 AM  
 HGB 7.0 (L) 10/06/2018 04:24 AM  
 HCT 22.4 (L) 10/06/2018 04:24 AM  
  10/06/2018 04:24 AM  
 
 
Imaging Reviewed: 
No results found. Medications Reviewed: 
Current Facility-Administered Medications Medication Dose Route Frequency  cefepime (MAXIPIME) 1 g in 0.9% sodium chloride (MBP/ADV) 50 mL MBP  1 g IntraVENous Q12H  
 vancomycin (VANCOCIN) 750 mg in 0.9% sodium chloride (MBP/ADV) 250 mL ADV  750 mg IntraVENous Q36H  VANCOMYCIN INFORMATION NOTE   Other ONCE  
 VANCOMYCIN INFORMATION NOTE 1 Each  1 Each Other Rx Dosing/Monitoring  sodium chloride (NS) flush 5-10 mL  5-10 mL IntraVENous PRN  
 0.9% sodium chloride infusion  100 mL/hr IntraVENous CONTINUOUS  
 labetalol (NORMODYNE) tablet 300 mg  300 mg Oral BID  simvastatin (ZOCOR) tablet 20 mg  20 mg Oral QHS  venlafaxine-SR (EFFEXOR-XR) capsule 150 mg  150 mg Oral DAILY WITH BREAKFAST  ferrous sulfate tablet 324 mg  324 mg Oral ACB  acyclovir (ZOVIRAX) tablet 400 mg  400 mg Oral DAILY  sodium chloride (NS) flush 5-10 mL  5-10 mL IntraVENous PRN  
 acetaminophen (TYLENOL) tablet 650 mg  650 mg Oral Q4H PRN  
 morphine injection 2 mg  2 mg IntraVENous Q3H PRN  
 heparin (porcine) injection 5,000 Units  5,000 Units SubCUTAneous Q8H  
 insulin lispro (HUMALOG) injection   SubCUTAneous AC&HS  
 glucose chewable tablet 16 g  4 Tab Oral PRN  
 glucagon (GLUCAGEN) injection 1 mg  1 mg IntraMUSCular PRN  
 dextrose (D50) infusion 12.5-25 g  25-50 mL IntraVENous PRN  
  VANCOMYCIN INFORMATION NOTE   Other Rx Dosing/Monitoring  clindamycin (CLEOCIN) 900mg D5W 50mL IVPB (premix)  900 mg IntraVENous Q8H  
 budesonide (PULMICORT) 500 mcg/2 ml nebulizer suspension  500 mcg Nebulization BID RT  
 arformoterol (BROVANA) neb solution 15 mcg  15 mcg Nebulization BID RT Jose Ridley DO Internal Medicine, Hospitalist 
Pager: [phone] [facility]

## 2018-10-06 NOTE — PROGRESS NOTES
1158: Pt sitting in recliner, refusing to participate in PT at this time. Will follow up as schedule permits. 1317: 2nd attempt, pt sitting EOB, reports getting herself up without assistance.   Refusing PT

## 2018-10-06 NOTE — PROGRESS NOTES
Problem: Falls - Risk of 
Goal: *Absence of Falls Document Bill Hunger Fall Risk and appropriate interventions in the flowsheet. Outcome: Progressing Towards Goal 
Fall Risk Interventions: 
Mobility Interventions: Bed/chair exit alarm Medication Interventions: Patient to call before getting OOB Elimination Interventions: Call light in reach Problem: Pressure Injury - Risk of 
Goal: *Prevention of pressure injury Document Aureliano Scale and appropriate interventions in the flowsheet. Outcome: Progressing Towards Goal 
Pressure Injury Interventions: Activity Interventions: Pressure redistribution bed/mattress(bed type) Mobility Interventions: Assess need for specialty bed Nutrition Interventions: Document food/fluid/supplement intake

## 2018-10-07 ENCOUNTER — APPOINTMENT (OUTPATIENT)
Dept: ULTRASOUND IMAGING | Age: 48
DRG: 460 | End: 2018-10-07
Attending: HOSPITALIST
Payer: MEDICAID

## 2018-10-07 LAB
ANION GAP SERPL CALC-SCNC: 14 MMOL/L (ref 3–18)
BASOPHILS # BLD: 0 K/UL (ref 0–0.1)
BASOPHILS NFR BLD: 0 % (ref 0–2)
BUN SERPL-MCNC: 39 MG/DL (ref 7–18)
BUN/CREAT SERPL: 15 (ref 12–20)
CALCIUM SERPL-MCNC: 8.4 MG/DL (ref 8.5–10.1)
CHLORIDE SERPL-SCNC: 106 MMOL/L (ref 100–108)
CO2 SERPL-SCNC: 20 MMOL/L (ref 21–32)
CREAT SERPL-MCNC: 2.59 MG/DL (ref 0.6–1.3)
DIFFERENTIAL METHOD BLD: ABNORMAL
EOSINOPHIL # BLD: 0.3 K/UL (ref 0–0.4)
EOSINOPHIL NFR BLD: 2 % (ref 0–5)
ERYTHROCYTE [DISTWIDTH] IN BLOOD BY AUTOMATED COUNT: 17.1 % (ref 11.6–14.5)
GLUCOSE BLD STRIP.AUTO-MCNC: 101 MG/DL (ref 70–110)
GLUCOSE BLD STRIP.AUTO-MCNC: 105 MG/DL (ref 70–110)
GLUCOSE BLD STRIP.AUTO-MCNC: 88 MG/DL (ref 70–110)
GLUCOSE SERPL-MCNC: 65 MG/DL (ref 74–99)
HCT VFR BLD AUTO: 22.4 % (ref 35–45)
HGB BLD-MCNC: 7 G/DL (ref 12–16)
LYMPHOCYTES # BLD: 1.1 K/UL (ref 0.9–3.6)
LYMPHOCYTES NFR BLD: 8 % (ref 21–52)
MCH RBC QN AUTO: 20.2 PG (ref 24–34)
MCHC RBC AUTO-ENTMCNC: 31.3 G/DL (ref 31–37)
MCV RBC AUTO: 64.7 FL (ref 74–97)
MONOCYTES # BLD: 1.2 K/UL (ref 0.05–1.2)
MONOCYTES NFR BLD: 9 % (ref 3–10)
NEUTS SEG # BLD: 11.3 K/UL (ref 1.8–8)
NEUTS SEG NFR BLD: 81 % (ref 40–73)
PLATELET # BLD AUTO: 210 K/UL (ref 135–420)
POTASSIUM SERPL-SCNC: 4.1 MMOL/L (ref 3.5–5.5)
RBC # BLD AUTO: 3.46 M/UL (ref 4.2–5.3)
SODIUM SERPL-SCNC: 140 MMOL/L (ref 136–145)
WBC # BLD AUTO: 13.8 K/UL (ref 4.6–13.2)

## 2018-10-07 PROCEDURE — 74011250636 HC RX REV CODE- 250/636: Performed by: HOSPITALIST

## 2018-10-07 PROCEDURE — 36415 COLL VENOUS BLD VENIPUNCTURE: CPT | Performed by: EMERGENCY MEDICINE

## 2018-10-07 PROCEDURE — 77010033678 HC OXYGEN DAILY

## 2018-10-07 PROCEDURE — 94640 AIRWAY INHALATION TREATMENT: CPT

## 2018-10-07 PROCEDURE — 74011000258 HC RX REV CODE- 258: Performed by: INTERNAL MEDICINE

## 2018-10-07 PROCEDURE — 80048 BASIC METABOLIC PNL TOTAL CA: CPT | Performed by: EMERGENCY MEDICINE

## 2018-10-07 PROCEDURE — 74011000250 HC RX REV CODE- 250: Performed by: HOSPITALIST

## 2018-10-07 PROCEDURE — 76882 US LMTD JT/FCL EVL NVASC XTR: CPT

## 2018-10-07 PROCEDURE — 74011250637 HC RX REV CODE- 250/637: Performed by: HOSPITALIST

## 2018-10-07 PROCEDURE — 85025 COMPLETE CBC W/AUTO DIFF WBC: CPT | Performed by: EMERGENCY MEDICINE

## 2018-10-07 PROCEDURE — 65270000029 HC RM PRIVATE

## 2018-10-07 PROCEDURE — 82962 GLUCOSE BLOOD TEST: CPT

## 2018-10-07 PROCEDURE — 74011250636 HC RX REV CODE- 250/636: Performed by: INTERNAL MEDICINE

## 2018-10-07 RX ORDER — VANCOMYCIN/0.9 % SOD CHLORIDE 1.5G/250ML
1500 PLASTIC BAG, INJECTION (ML) INTRAVENOUS
Status: DISCONTINUED | OUTPATIENT
Start: 2018-10-08 | End: 2018-10-10 | Stop reason: SDUPTHER

## 2018-10-07 RX ADMIN — CLINDAMYCIN PHOSPHATE 900 MG: 900 INJECTION, SOLUTION INTRAVENOUS at 09:58

## 2018-10-07 RX ADMIN — HEPARIN SODIUM 5000 UNITS: 5000 INJECTION INTRAVENOUS; SUBCUTANEOUS at 04:38

## 2018-10-07 RX ADMIN — CEFEPIME HYDROCHLORIDE 1 G: 1 INJECTION, POWDER, FOR SOLUTION INTRAMUSCULAR; INTRAVENOUS at 22:34

## 2018-10-07 RX ADMIN — BUDESONIDE 500 MCG: 0.5 INHALANT RESPIRATORY (INHALATION) at 22:34

## 2018-10-07 RX ADMIN — HEPARIN SODIUM 5000 UNITS: 5000 INJECTION INTRAVENOUS; SUBCUTANEOUS at 18:59

## 2018-10-07 RX ADMIN — ARFORMOTEROL TARTRATE 15 MCG: 15 SOLUTION RESPIRATORY (INHALATION) at 22:34

## 2018-10-07 RX ADMIN — BUDESONIDE 500 MCG: 0.5 INHALANT RESPIRATORY (INHALATION) at 08:46

## 2018-10-07 RX ADMIN — CEFEPIME HYDROCHLORIDE 1 G: 1 INJECTION, POWDER, FOR SOLUTION INTRAMUSCULAR; INTRAVENOUS at 00:34

## 2018-10-07 RX ADMIN — HEPARIN SODIUM 5000 UNITS: 5000 INJECTION INTRAVENOUS; SUBCUTANEOUS at 09:54

## 2018-10-07 RX ADMIN — SIMVASTATIN 20 MG: 20 TABLET, FILM COATED ORAL at 21:55

## 2018-10-07 RX ADMIN — LABETALOL HYDROCHLORIDE 300 MG: 100 TABLET, FILM COATED ORAL at 18:51

## 2018-10-07 RX ADMIN — LABETALOL HYDROCHLORIDE 300 MG: 100 TABLET, FILM COATED ORAL at 09:54

## 2018-10-07 RX ADMIN — FERROUS SULFATE TAB 325 MG (65 MG ELEMENTAL FE) 325 MG: 325 (65 FE) TAB at 09:48

## 2018-10-07 RX ADMIN — CLINDAMYCIN PHOSPHATE 900 MG: 900 INJECTION, SOLUTION INTRAVENOUS at 04:38

## 2018-10-07 RX ADMIN — ACETAMINOPHEN 650 MG: 325 TABLET, FILM COATED ORAL at 20:37

## 2018-10-07 RX ADMIN — SODIUM CHLORIDE 100 ML/HR: 900 INJECTION, SOLUTION INTRAVENOUS at 20:22

## 2018-10-07 RX ADMIN — CEFEPIME HYDROCHLORIDE 1 G: 1 INJECTION, POWDER, FOR SOLUTION INTRAMUSCULAR; INTRAVENOUS at 12:15

## 2018-10-07 RX ADMIN — CLINDAMYCIN PHOSPHATE 900 MG: 900 INJECTION, SOLUTION INTRAVENOUS at 18:46

## 2018-10-07 RX ADMIN — MORPHINE SULFATE 2 MG: 4 INJECTION INTRAVENOUS at 04:47

## 2018-10-07 RX ADMIN — VENLAFAXINE HYDROCHLORIDE 150 MG: 75 CAPSULE, EXTENDED RELEASE ORAL at 09:49

## 2018-10-07 RX ADMIN — SODIUM CHLORIDE 100 ML/HR: 900 INJECTION, SOLUTION INTRAVENOUS at 09:48

## 2018-10-07 RX ADMIN — ACYCLOVIR 400 MG: 800 TABLET ORAL at 12:16

## 2018-10-07 RX ADMIN — MORPHINE SULFATE 2 MG: 4 INJECTION INTRAVENOUS at 20:37

## 2018-10-07 RX ADMIN — ARFORMOTEROL TARTRATE 15 MCG: 15 SOLUTION RESPIRATORY (INHALATION) at 08:46

## 2018-10-07 NOTE — PROGRESS NOTES
Problem: Falls - Risk of 
Goal: *Absence of Falls Document Bill Hunger Fall Risk and appropriate interventions in the flowsheet. Outcome: Progressing Towards Goal 
Fall Risk Interventions: 
Mobility Interventions: Patient to call before getting OOB Medication Interventions: Patient to call before getting OOB Elimination Interventions: Call light in reach, Patient to call for help with toileting needs, Toileting schedule/hourly rounds Problem: Pressure Injury - Risk of 
Goal: *Prevention of pressure injury Document Aureliano Scale and appropriate interventions in the flowsheet. Outcome: Progressing Towards Goal 
Pressure Injury Interventions: Activity Interventions: Pressure redistribution bed/mattress(bed type) Mobility Interventions: Pressure redistribution bed/mattress (bed type), HOB 30 degrees or less Nutrition Interventions: Document food/fluid/supplement intake

## 2018-10-07 NOTE — ROUTINE PROCESS
Bedside and Verbal shift change report given to Abhishek Aguilar RN (oshncoming nurse) by Leonid Parada RN (offgoing nurse). Report included the folowing information SBAR, Kardex, Intake/Output, MAR and Recent Results.

## 2018-10-07 NOTE — PROGRESS NOTES
Pharmacy Dosing Services: Vancomycin Indication: Cellulitis Day of therapy: 5 Other Antimicrobials (Include dose, start day & day of therapy): 
Cefepime 1 gm IV every 12 hours (started 10/5 - previously on x 2 days) Clindamycin 900 mg iv q8h Acyclovir 400 mg PO daily Loading dose (date given): 2000 mg 
Current Maintenance dose: 750 mg IV every 36 hours Goal Vancomycin Level: 15 
(Trough 15-20 for most infections, 20 for meningitis/osteomyelitis, pre-HD level ~25) Vancomycin Level (if drawn):  
10/4 17.1 (25 hr post 1st dose only) 10/6 - 12.8  after 37.38 hours Significant Cultures:  
Blood: negative. Pending. Renal function stable? (unstable defined as SCr increase of 0.5 mg/dL or > 50% increase from baseline, whichever is greater) (Y/N): N  
 
CAPD, Hemodialysis or Renal Replacement Therapy (Y/N): N Recent Labs 10/07/18 
 0440  10/06/18 
 0424  10/05/18 
 0405 CREA  2.59*  3.47*  4.00* BUN  39*  47*  51* WBC  13.8*  15.2*   -- Temp (24hrs), Av.5 °F (36.9 °C), Min:98 °F (36.7 °C), Max:99.4 °F (37.4 °C) Creatinine Clearance (Creatinine Clearance (ml/min)): Estimated Creatinine Clearance: 35 mL/min (based on Cr of 2.59). Regimen assessment:  
- Renal function slowly improving - Adjusted dose to account for vancomycin peak and AUC Maintenance dose: 1500 mg IV every 48 hours Next scheduled level: 10/10 at 0100 Pharmacy will follow daily and adjust medications as appropriate for renal function and/or serum levels.  
 
Thank you, 
Margo Bridges, PHARMD

## 2018-10-07 NOTE — PROGRESS NOTES
Internal Medicine Progress Note Patient's Name: Amairani Copeland Admit Date: 10/3/2018 Length of Stay: 4 Assessment/Plan Active Hospital Problems Diagnosis Date Noted  Cellulitis 10/03/2018  Chronic anemia 07/28/2018  Acute on chronic renal failure ([facility] 09/02/2013 Baseline Cr 1.3, Cr of 3.79 with eGFR of 14 on admission.  CKD (chronic kidney disease) stage 3, GFR 30-59 ml/min ([facility]) 09/02/2013 Baseline Cr ~1.3, eGFR ~47, Nephrologist Dr. Vivek Ayala.  Morbid obesity ([facility] 09/02/2013 BMI 42 with hypertension and type 2 DM.  Type 2 diabetes mellitus ([facility] 09/02/2013  Essential hypertension 09/02/2013  
 
- Cont IVAB w/ vanco/clinda and cefepime - Check US non-vasc of indurated areas to r/o abscess formation - F/u ID 
- Keep leg elevated - Trend CBC 
- Cr cont to trend down nicely - Trend BMP 
- Cont IVFs 
- PT/OT 
- Cont acceptable home medications for chronic conditions  
- DVT protocol I have personally reviewed all pertinent labs and films that have officially resulted over the last 24 hours. I have personally checked for all pending labs that are awaiting final results. Subjective Pt s/e @ bedside No major events overnight Redness improved to only indurated areas Denies fever/chills Denies CP or SOB Objective Visit Vitals  BP (!) 160/91 (BP 1 Location: Left arm, BP Patient Position: At rest)  Pulse 90  Temp 98.4 °F (36.9 °C)  Resp 16  
 Ht 5' (1.524 m)  Wt 145.2 kg (320 lb)  SpO2 99%  Breastfeeding No  
 BMI 62.5 kg/m2 Physical Exam: 
General Appearance: NAD, conversant Lungs: CTA with normal respiratory effort CV: RRR, no m/r/g Abdomen: soft, non-tender, normal bowel sounds Extremities: no cyanosis, B/L lymphedema, LLE erythema that has improved now to just below the knee now only on the several areas of induration Neuro: No focal deficits, motor/sensory intact Lab/Data Reviewed: 
BMP:  
Lab Results Component Value Date/Time  10/07/2018 04:40 AM  
 K 4.1 10/07/2018 04:40 AM  
  10/07/2018 04:40 AM  
 CO2 20 (L) 10/07/2018 04:40 AM  
 AGAP 14 10/07/2018 04:40 AM  
 GLU 65 (L) 10/07/2018 04:40 AM  
 BUN 39 (H) 10/07/2018 04:40 AM  
 CREA 2.59 (H) 10/07/2018 04:40 AM  
 GFRAA 24 (L) 10/07/2018 04:40 AM  
 GFRNA 20 (L) 10/07/2018 04:40 AM  
 
CBC:  
Lab Results Component Value Date/Time WBC 13.8 (H) 10/07/2018 04:40 AM  
 HGB 7.0 (L) 10/07/2018 04:40 AM  
 HCT 22.4 (L) 10/07/2018 04:40 AM  
  10/07/2018 04:40 AM  
 
 
Imaging Reviewed: 
No results found. Medications Reviewed: 
Current Facility-Administered Medications Medication Dose Route Frequency  [START ON 10/9/2018] VANCOMYCIN INFORMATION NOTE   Other ONCE  
 cefepime (MAXIPIME) 1 g in 0.9% sodium chloride (MBP/ADV) 50 mL MBP  1 g IntraVENous Q12H  
 vancomycin (VANCOCIN) 750 mg in 0.9% sodium chloride (MBP/ADV) 250 mL ADV  750 mg IntraVENous Q36H  VANCOMYCIN INFORMATION NOTE 1 Each  1 Each Other Rx Dosing/Monitoring  sodium chloride (NS) flush 5-10 mL  5-10 mL IntraVENous PRN  
 0.9% sodium chloride infusion  100 mL/hr IntraVENous CONTINUOUS  
 labetalol (NORMODYNE) tablet 300 mg  300 mg Oral BID  simvastatin (ZOCOR) tablet 20 mg  20 mg Oral QHS  venlafaxine-SR (EFFEXOR-XR) capsule 150 mg  150 mg Oral DAILY WITH BREAKFAST  ferrous sulfate tablet 324 mg  324 mg Oral ACB  acyclovir (ZOVIRAX) tablet 400 mg  400 mg Oral DAILY  sodium chloride (NS) flush 5-10 mL  5-10 mL IntraVENous PRN  
 acetaminophen (TYLENOL) tablet 650 mg  650 mg Oral Q4H PRN  
 morphine injection 2 mg  2 mg IntraVENous Q3H PRN  
 heparin (porcine) injection 5,000 Units  5,000 Units SubCUTAneous Q8H  
 insulin lispro (HUMALOG) injection   SubCUTAneous AC&HS  
 glucose chewable tablet 16 g  4 Tab Oral PRN  
 glucagon (GLUCAGEN) injection 1 mg  1 mg IntraMUSCular PRN  
  dextrose (D50) infusion 12.5-25 g  25-50 mL IntraVENous PRN  
 VANCOMYCIN INFORMATION NOTE   Other Rx Dosing/Monitoring  clindamycin (CLEOCIN) 900mg D5W 50mL IVPB (premix)  900 mg IntraVENous Q8H  
 budesonide (PULMICORT) 500 mcg/2 ml nebulizer suspension  500 mcg Nebulization BID RT  
 arformoterol (BROVANA) neb solution 15 mcg  15 mcg Nebulization BID RT Lorene Carranza,  Internal Medicine, Hospitalist 
Pager: [phone] [facility]

## 2018-10-07 NOTE — PROGRESS NOTES
met with Patient completed the initial Spiritual Assessment of the patient, and offered Pastoral Care, see flow sheets for interventions. Patient does not have any Zoroastrianism/cultural needs that will affect patients preferences in health care. Charted reviewed. Chaplains will continue to follow and will provide pastoral care on an as needed/requested basis. [id number] [facility] Amarjit Contreras, MPH  
[facility] Spiritual Care Department [phone]

## 2018-10-07 NOTE — ROUTINE PROCESS
5816 assumed care of pt after bedside verbal report was given by off going nurse, pt resting in bed awake, normal saline infusing at 100 ml/hr, no acute distress noted

## 2018-10-07 NOTE — PROGRESS NOTES
Problem: Falls - Risk of 
Goal: *Absence of Falls Document Nani Deal Fall Risk and appropriate interventions in the flowsheet. Outcome: Progressing Towards Goal 
Fall Risk Interventions: 
Mobility Interventions: Patient to call before getting OOB Medication Interventions: Evaluate medications/consider consulting pharmacy, Patient to call before getting OOB Elimination Interventions: Call light in reach, Patient to call for help with toileting needs Problem: Pressure Injury - Risk of 
Goal: *Prevention of pressure injury Document Aureliano Scale and appropriate interventions in the flowsheet. Outcome: Progressing Towards Goal 
Pressure Injury Interventions: Activity Interventions: Increase time out of bed, Pressure redistribution bed/mattress(bed type) Mobility Interventions: Pressure redistribution bed/mattress (bed type) Nutrition Interventions: Document food/fluid/supplement intake

## 2018-10-07 NOTE — ROUTINE PROCESS
Bedside and Verbal shift change report given to Christina Hamilton RN (oncoming nurse) by BRET Montero (offgoing nurse). Report included the following information SBAR, Kardex and MAR.

## 2018-10-07 NOTE — PROGRESS NOTES
1959: Assumed pt care. Received pt resting in bed, pt is alert and oriented x 4. Left leg elevated on 2 pillows. No signs of distress. Bed on lowest position, wheels locked, call bell within reach. 2110: patient's PIV is infiltrated, inserted a new PIV 22 gauge on pt's right hand, with good blood return, flushes good infusing well. 2123: patient refused blood sugar check she stated she does not like needles and that she keeps her blood sugar under control through diet. Patient refused 2 times inspite giving her education about the importance of checking the blood sugar by this nurse and JUANA Govea. 
 
10-7-18 
 
0700: patient is going to be transferred to 2200. Report called to [facility]. 0732: patient wheeled via wheelchair to [address]. Pt in stable condition.

## 2018-10-08 PROBLEM — R60.0 BILATERAL EDEMA OF LOWER EXTREMITY: Status: ACTIVE | Noted: 2018-10-08

## 2018-10-08 LAB
ANION GAP SERPL CALC-SCNC: 8 MMOL/L (ref 3–18)
BASOPHILS # BLD: 0 K/UL (ref 0–0.1)
BASOPHILS NFR BLD: 0 % (ref 0–2)
BUN SERPL-MCNC: 32 MG/DL (ref 7–18)
BUN/CREAT SERPL: 14 (ref 12–20)
CALCIUM SERPL-MCNC: 8.4 MG/DL (ref 8.5–10.1)
CHLORIDE SERPL-SCNC: 107 MMOL/L (ref 100–108)
CO2 SERPL-SCNC: 26 MMOL/L (ref 21–32)
CREAT SERPL-MCNC: 2.21 MG/DL (ref 0.6–1.3)
DIFFERENTIAL METHOD BLD: ABNORMAL
EOSINOPHIL # BLD: 0.3 K/UL (ref 0–0.4)
EOSINOPHIL NFR BLD: 2 % (ref 0–5)
ERYTHROCYTE [DISTWIDTH] IN BLOOD BY AUTOMATED COUNT: 17 % (ref 11.6–14.5)
GLUCOSE BLD STRIP.AUTO-MCNC: 109 MG/DL (ref 70–110)
GLUCOSE BLD STRIP.AUTO-MCNC: 121 MG/DL (ref 70–110)
GLUCOSE BLD STRIP.AUTO-MCNC: 90 MG/DL (ref 70–110)
GLUCOSE SERPL-MCNC: 78 MG/DL (ref 74–99)
HCT VFR BLD AUTO: 22.4 % (ref 35–45)
HGB BLD-MCNC: 7.1 G/DL (ref 12–16)
LYMPHOCYTES # BLD: 1 K/UL (ref 0.9–3.6)
LYMPHOCYTES NFR BLD: 7 % (ref 21–52)
MCH RBC QN AUTO: 20.3 PG (ref 24–34)
MCHC RBC AUTO-ENTMCNC: 31.7 G/DL (ref 31–37)
MCV RBC AUTO: 64.2 FL (ref 74–97)
MONOCYTES # BLD: 1.1 K/UL (ref 0.05–1.2)
MONOCYTES NFR BLD: 8 % (ref 3–10)
NEUTS SEG # BLD: 11.5 K/UL (ref 1.8–8)
NEUTS SEG NFR BLD: 83 % (ref 40–73)
PLATELET # BLD AUTO: 238 K/UL (ref 135–420)
PLATELET COMMENTS,PCOM: ABNORMAL
POTASSIUM SERPL-SCNC: 3.3 MMOL/L (ref 3.5–5.5)
RBC # BLD AUTO: 3.49 M/UL (ref 4.2–5.3)
RBC MORPH BLD: ABNORMAL
SODIUM SERPL-SCNC: 141 MMOL/L (ref 136–145)
WBC # BLD AUTO: 13.9 K/UL (ref 4.6–13.2)

## 2018-10-08 PROCEDURE — 97116 GAIT TRAINING THERAPY: CPT

## 2018-10-08 PROCEDURE — 65270000029 HC RM PRIVATE

## 2018-10-08 PROCEDURE — 77010033678 HC OXYGEN DAILY

## 2018-10-08 PROCEDURE — 74011250636 HC RX REV CODE- 250/636: Performed by: NURSE PRACTITIONER

## 2018-10-08 PROCEDURE — 74011250636 HC RX REV CODE- 250/636: Performed by: HOSPITALIST

## 2018-10-08 PROCEDURE — 85025 COMPLETE CBC W/AUTO DIFF WBC: CPT | Performed by: EMERGENCY MEDICINE

## 2018-10-08 PROCEDURE — 74011000258 HC RX REV CODE- 258: Performed by: INTERNAL MEDICINE

## 2018-10-08 PROCEDURE — 77030027138 HC INCENT SPIROMETER -A

## 2018-10-08 PROCEDURE — 82962 GLUCOSE BLOOD TEST: CPT

## 2018-10-08 PROCEDURE — 36415 COLL VENOUS BLD VENIPUNCTURE: CPT | Performed by: EMERGENCY MEDICINE

## 2018-10-08 PROCEDURE — 74011000250 HC RX REV CODE- 250: Performed by: HOSPITALIST

## 2018-10-08 PROCEDURE — 74011250636 HC RX REV CODE- 250/636: Performed by: INTERNAL MEDICINE

## 2018-10-08 PROCEDURE — 74011250637 HC RX REV CODE- 250/637: Performed by: HOSPITALIST

## 2018-10-08 PROCEDURE — 80048 BASIC METABOLIC PNL TOTAL CA: CPT | Performed by: EMERGENCY MEDICINE

## 2018-10-08 RX ORDER — POTASSIUM CHLORIDE 7.45 MG/ML
10 INJECTION INTRAVENOUS
Status: DISPENSED | OUTPATIENT
Start: 2018-10-08 | End: 2018-10-08

## 2018-10-08 RX ORDER — POTASSIUM CHLORIDE 7.45 MG/ML
10 INJECTION INTRAVENOUS
Status: DISCONTINUED | OUTPATIENT
Start: 2018-10-08 | End: 2018-10-08 | Stop reason: SDUPTHER

## 2018-10-08 RX ORDER — AMLODIPINE BESYLATE 10 MG/1
10 TABLET ORAL DAILY
Status: DISCONTINUED | OUTPATIENT
Start: 2018-10-09 | End: 2018-10-10 | Stop reason: HOSPADM

## 2018-10-08 RX ADMIN — HEPARIN SODIUM 5000 UNITS: 5000 INJECTION INTRAVENOUS; SUBCUTANEOUS at 18:02

## 2018-10-08 RX ADMIN — MORPHINE SULFATE 2 MG: 4 INJECTION INTRAVENOUS at 21:26

## 2018-10-08 RX ADMIN — VANCOMYCIN HYDROCHLORIDE 1500 MG: 10 INJECTION, POWDER, LYOPHILIZED, FOR SOLUTION INTRAVENOUS at 03:27

## 2018-10-08 RX ADMIN — POTASSIUM CHLORIDE 10 MEQ: 7.46 INJECTION, SOLUTION INTRAVENOUS at 18:00

## 2018-10-08 RX ADMIN — HEPARIN SODIUM 5000 UNITS: 5000 INJECTION INTRAVENOUS; SUBCUTANEOUS at 02:32

## 2018-10-08 RX ADMIN — CLINDAMYCIN PHOSPHATE 900 MG: 900 INJECTION, SOLUTION INTRAVENOUS at 02:33

## 2018-10-08 RX ADMIN — BUDESONIDE 500 MCG: 0.5 INHALANT RESPIRATORY (INHALATION) at 20:05

## 2018-10-08 RX ADMIN — POTASSIUM CHLORIDE 10 MEQ: 7.46 INJECTION, SOLUTION INTRAVENOUS at 21:04

## 2018-10-08 RX ADMIN — LABETALOL HYDROCHLORIDE 300 MG: 100 TABLET, FILM COATED ORAL at 17:58

## 2018-10-08 RX ADMIN — LABETALOL HYDROCHLORIDE 300 MG: 100 TABLET, FILM COATED ORAL at 08:49

## 2018-10-08 RX ADMIN — SIMVASTATIN 20 MG: 20 TABLET, FILM COATED ORAL at 21:26

## 2018-10-08 RX ADMIN — CLINDAMYCIN PHOSPHATE 900 MG: 900 INJECTION, SOLUTION INTRAVENOUS at 18:04

## 2018-10-08 RX ADMIN — HEPARIN SODIUM 5000 UNITS: 5000 INJECTION INTRAVENOUS; SUBCUTANEOUS at 09:09

## 2018-10-08 RX ADMIN — ARFORMOTEROL TARTRATE 15 MCG: 15 SOLUTION RESPIRATORY (INHALATION) at 20:05

## 2018-10-08 RX ADMIN — FERROUS SULFATE TAB 325 MG (65 MG ELEMENTAL FE) 325 MG: 325 (65 FE) TAB at 07:32

## 2018-10-08 RX ADMIN — CEFEPIME HYDROCHLORIDE 1 G: 1 INJECTION, POWDER, FOR SOLUTION INTRAMUSCULAR; INTRAVENOUS at 10:24

## 2018-10-08 RX ADMIN — CLINDAMYCIN PHOSPHATE 900 MG: 900 INJECTION, SOLUTION INTRAVENOUS at 09:06

## 2018-10-08 RX ADMIN — ACYCLOVIR 400 MG: 800 TABLET ORAL at 08:50

## 2018-10-08 RX ADMIN — MORPHINE SULFATE 2 MG: 4 INJECTION INTRAVENOUS at 06:38

## 2018-10-08 RX ADMIN — VENLAFAXINE HYDROCHLORIDE 150 MG: 75 CAPSULE, EXTENDED RELEASE ORAL at 08:50

## 2018-10-08 RX ADMIN — POTASSIUM CHLORIDE 10 MEQ: 7.46 INJECTION, SOLUTION INTRAVENOUS at 16:55

## 2018-10-08 NOTE — ROUTINE PROCESS
Bedside and Verbal shift change report given to Jessica Mercado RN (oncoming nurse) by Hyun Medrano RN 
 (offgoing nurse). Report included the following information SBAR, Kardex and MAR.

## 2018-10-08 NOTE — PROGRESS NOTES
2000 Patient received resting quietly in bed. Alert & oriented x4. Call light in reach & side rails up x3. No complaints voiced at present. Patient instructed not to get out of bed by herself. Patient verbalizes understanding. Left leg elevated x2 pillows. Short of breath with exertion. 2037 Tylenol given as requested by patient for temp of 100.3. Morphine IV given for complain of left leg pain. 0100 Walked in patient's room & found her on bedside camode. Patient assisted back to bed. Patient reminded not to get out of bed by herself. Short of breath with exertion.

## 2018-10-08 NOTE — PROGRESS NOTES
1510: Bedside report received from off going RN Kathia Duarte, Patient up in bed no acute distress noted. Left leg edema noted +4, no respiratory or cardiac distress noted. 1615: Patient called after attempting to go to the restroom on her own and wet her gown. Gown changed and patient helped back to bed. Advised to use call bell. 1815: Patient found sitting up in bed naked, stated she wet her gown again. Gown changed and patient helped back to bed, reiterated the importance of calling and not falling. Med administered per [facility]. Legs elevated and no acute distress noted. 1915: Bedside report given to oncoming BRET Davidson, no acute distress noted and bedside commode by bed to aid in elimination activities.

## 2018-10-08 NOTE — PROGRESS NOTES
1928:  Bedside report received from Theo Burrows, [facility]. Patient received sitting at bedside. IV site slightly swollen and painful to touch. Patient complaints of pain 7/10. Patient encouraged to put elevate LLE on pillows provided. Call bell within reach. Will continue to monitor. Bedside shift change report given to Jakob Eubanks (oncoming nurse) by Jefferson Vann RN (offgoing nurse). Report included the following information Intake/Output, MAR, Recent Results and Med Rec Status.

## 2018-10-08 NOTE — PROGRESS NOTES
Problem: Pressure Injury - Risk of 
Goal: *Prevention of pressure injury Document Aureliano Scale and appropriate interventions in the flowsheet. Outcome: Progressing Towards Goal 
Pressure Injury Interventions: Activity Interventions: Increase time out of bed, Pressure redistribution bed/mattress(bed type) Mobility Interventions: Pressure redistribution bed/mattress (bed type) Nutrition Interventions: Document food/fluid/supplement intake

## 2018-10-08 NOTE — PROGRESS NOTES
Infectious Disease Follow-up Admit Date: 10/3/2018 Current abx Prior abx Vanco/clinda 10/3-5  10/3 Ceftriaxone x 1 zosyn 10/3-2, cefepime 10/5-3 Assessment ->Rec:  
 
Sepsis poa- wbc 34k low grade fever joon d/t cellulitis ->wbc slowly cont to come down 
->monitor bctx's, ngtd LLE cellulitis- CT done in ER read as edema c/w cellulitis no gas abscess or OM  
-reports began 2 days PTA ? Insect bite with pus treated peroxide  
-tender L inguinal likely reactive lymphadenitis, faint tender erythema with indurated areas without drainage foot to above knee, no crepitance (+) DP, 3+ edema, R leg less edema (+) scarring  ->monitor on abx, slowly improving 
->cont Vanc/clinda for now 
-> d/c cefepime as suspect SA/Strep as main pathogen 
-> d/w Dr Abhishek Sen Chronic ble edema  ->diuresis, d/w pt to elevate b/l LE at heart level H/o Gp G beta strep BSI from RLE cellulitis 7/28, with sepsis joon then- seen by Dr. Ginny Montoya, treated initially iv abx out 8/1 on 10 day course of keflex  ->monitor bctx's - NTD JOON on CKD3 Cr 4.7 ->4.2->4 ->2.21 now -- was 1.6 7/31  ->dose abx for reduced eGFR 
  
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   
 
MICROBIOLOGY:  
10/3 bctx x 2 ngtd LINES AND CATHETERS:  
piv Active Hospital Problems Diagnosis Date Noted  Cellulitis 10/03/2018  Chronic anemia 07/28/2018  Acute on chronic renal failure ([facility] 09/02/2013 Baseline Cr 1.3, Cr of 3.79 with eGFR of 14 on admission.  CKD (chronic kidney disease) stage 3, GFR 30-59 ml/min ([facility]) 09/02/2013 Baseline Cr ~1.3, eGFR ~47, Nephrologist Dr. Bryan Montanez.  Morbid obesity ([facility] [address] 09/02/2013 BMI 42 with hypertension and type 2 DM.  [facility] 09/02/2013  Essential hypertension 09/02/2013 Subjective: Interval notes reviewed. Pt says her leg slowly improving, does have some redness and warmth still with tenderness, but better than what she presented with, had low grade fever yesterday, none today. Current Facility-Administered Medications Medication Dose Route Frequency  [START ON 10/9/2018] amLODIPine (NORVASC) tablet 10 mg  10 mg Oral DAILY  vancomycin (VANCOCIN) 1500 mg in  ml infusion  1,500 mg IntraVENous Q48H  
 [START ON 10/10/2018] VANCOMYCIN INFORMATION NOTE   Other ONCE  
 sodium chloride (NS) flush 5-10 mL  5-10 mL IntraVENous PRN  
 0.9% sodium chloride infusion  100 mL/hr IntraVENous CONTINUOUS  
 labetalol (NORMODYNE) tablet 300 mg  300 mg Oral BID  simvastatin (ZOCOR) tablet 20 mg  20 mg Oral QHS  venlafaxine-SR (EFFEXOR-XR) capsule 150 mg  150 mg Oral DAILY WITH BREAKFAST  ferrous sulfate tablet 324 mg  324 mg Oral ACB  acyclovir (ZOVIRAX) tablet 400 mg  400 mg Oral DAILY  sodium chloride (NS) flush 5-10 mL  5-10 mL IntraVENous PRN  
 acetaminophen (TYLENOL) tablet 650 mg  650 mg Oral Q4H PRN  
 morphine injection 2 mg  2 mg IntraVENous Q3H PRN  
 heparin (porcine) injection 5,000 Units  5,000 Units SubCUTAneous Q8H  
 insulin lispro (HUMALOG) injection   SubCUTAneous AC&HS  
 glucose chewable tablet 16 g  4 Tab Oral PRN  
 glucagon (GLUCAGEN) injection 1 mg  1 mg IntraMUSCular PRN  
 dextrose (D50) infusion 12.5-25 g  25-50 mL IntraVENous PRN  
 VANCOMYCIN INFORMATION NOTE   Other Rx Dosing/Monitoring  clindamycin (CLEOCIN) 900mg D5W 50mL IVPB (premix)  900 mg IntraVENous Q8H  
 budesonide (PULMICORT) 500 mcg/2 ml nebulizer suspension  500 mcg Nebulization BID RT  
 arformoterol (BROVANA) neb solution 15 mcg  15 mcg Nebulization BID RT  
 
 
 
Objective:  
 
Visit Vitals  BP (!) 180/109  Pulse 96  Temp 98.5 °F (36.9 °C)  Resp 22  
 Ht 5' (1.524 m)  Wt 145.2 kg (320 lb)  SpO2 97%  Breastfeeding No  
 BMI 62.5 kg/m2 Temp (24hrs), Av.1 °F (37.3 °C), Min:98.4 °F (36.9 °C), Max:100.3 °F (37.9 °C) GEN: obese BF sitting on side of bed, nurse in attendance NAD HEENT: anicteric no thrush NECK: supple trachea midline CHEST: no rales rhonchi or wheeze CVS: RRR no murmurs ABD: soft obese (+) BS non-tender EXT: decreased erythema LLE knee to foot, no drainage 3+ edema no crepitance , warmth present, tenderness especially calf and behind. Labs: Results:  
Chemistry Recent Labs 10/08/18 
 2286  10/07/18 
 0440  10/06/18 
 0424 GLU  78  65*  75 NA  141  140  140  
K  3.3*  4.1  3.5 CL  107  106  104 CO2  26  20*  22 BUN  32*  39*  47* CREA  2.21*  2.59*  3.47* CA  8.4*  8.4*  8.3* AGAP  8  14  14 BUCR  14  15  14 CBC w/Diff Recent Labs 10/08/18 
 8179  10/07/18 
 0440  10/06/18 
 0424 WBC  13.9*  13.8*  15.2*  
RBC  3.49*  3.46*  3.45* HGB  7.1*  7.0*  7.0*  
HCT  22.4*  22.4*  22.4*  
PLT  238  210  209 GRANS  PENDING  81*  83* LYMPH  PENDING  8*  7*  
EOS  PENDING  2  2 Microbiology Results No results for input(s): SDES, CULT in the last 72 hours. Cornel Hightower MD 
2018 [facility] [phone]

## 2018-10-08 NOTE — PROGRESS NOTES
Problem: Falls - Risk of 
Goal: *Absence of Falls Document Leticia Gallego Fall Risk and appropriate interventions in the flowsheet. Outcome: Progressing Towards Goal 
Fall Risk Interventions: 
Mobility Interventions: Patient to call before getting OOB Medication Interventions: Patient to call before getting OOB, Teach patient to arise slowly Elimination Interventions: Call light in reach, Patient to call for help with toileting needs

## 2018-10-08 NOTE — PROGRESS NOTES
Patient has declined to participate with therapy over the weekend. She remains on three IV antibiotics. She will discharge home with home care as indicated. If the patient requires IV antibiotics at home she will need to have a caregiver wiling to learn infusions and a PICC line. She lives with her daughter and her emergency  is her son,  Tyler Barroso,  Phone number: [phone]. She reportedly does not have any DME at home and may require a walker at discharge although therapy on initial evaluation did not recommended  DME. ID has been following this patient and notes, \"H/o Gp G beta strep BSI from RLE cellulitis 7/28, with sepsis benjamin then- seen by Dr. Lazaro Ervin, treated initially iv abx out 8/1 on 10 day course of keflex . \"  Discharge plan is to return home with home care as indiciated.  Darnell Yousif RN

## 2018-10-08 NOTE — PROGRESS NOTES
Problem: Mobility Impaired (Adult and Pediatric) Goal: *Acute Goals and Plan of Care (Insert Text) Physical Therapy Goals Initiated 10/5/2018 and to be accomplished within 7 day(s) 1. Patient will move from supine to sit and sit to supine in bed with modified independence. 2.  Patient will transfer from bed to chair and chair to bed with modified independence using the least restrictive device. 3.  Patient will perform sit to stand with modified independence. 4.  Patient will ambulate with modified independence for 100 feet with the least restrictive device. 5.  Patient will ascend/descend 6 stairs with handrail(s) with modified independence. Outcome: Progressing Towards Goal 
 
PHYSICAL THERAPY: Daily TREATMENT Note INPATIENT: Medicaid: Hospital Day: 6 Patient: Nereyda Franklin (67 y.o. female)    Date: 10/8/2018 Primary Diagnosis: Cellulitis  
 ,  ,  
Precautions: Fall, Skin WBAT Chart, physical therapy assessment, plan of care and goals were reviewed. PLOF:Independent ASSESSMENT: 
Pt limited by tenderness in LLE, however pt able to ambulate independently in room with good balance and safety. Pt educated on activity recommendations. Progression toward goals: 
      Improving appropriately and progressing toward goals PLAN: 
Patient continues to benefit from skilled intervention to address the above impairments. Continue treatment per established plan of care. EDUCATION:  
Education:  Patient was educated on the following topics: activity recommendations Barriers to Learning/Limitations: None Compensate with: visual, verbal, tactile, kinesthetic cues/model Discharge Recommendations:  None Further Equipment Recommendations for Discharge:  N/A Factors which may impact discharge planning: none SUBJECTIVE:  
Patient stated Its just feeling a little tender.  OBJECTIVE DATA SUMMARY:  
Critical Behavior: 
Neurologic State: Alert Orientation Level: Oriented X4 
 Cognition: Appropriate for age attention/concentration G CODE:Mobility [id number] Current  CH= 0%   Goal  CH= 0%. The severity rating is based on the Other KUSBS [address] Standing Balance Scale 
0: Pt performs 25% or less of standing activity (Max assist) CN, 100% impaired. 1: Pt supports self with upper extremities but requires therapist assistance. Pt performs 25-50% of effort (Mod assist) CM, 80% to <100% impaired. 1+: Pt supports self with upper extremities but requires therapist assistance. Pt performs >50% effort. (Min assist). CL, 60% to <80% impaired. 2: Pt supports self independently with both upper extremities (walker, crutches, parallel bars). CL, 60% to <80% impaired. 2+: Pt support self independently with 1 upper extremity (cane, crutch, 1 parallel bar). CK, 40% to <60% impaired. 3: Pt stands without upper extremity support for up to 30 seconds. CK, 40% to <60% impaired. 3+: Pt stands without upper extremity support for 30 seconds or greater. CJ, 20% to <40% impaired. 4: Pt independently moves and returns center of gravity 1-2 inches in one plane. CJ, 20% to <40% impaired. 4+: Pt independently moves and returns center of gravity 1-2 inches in multiple planes. CI, 1% to <20% impaired. 5: Pt independently moves and returns center of gravity in all planes greater than 2 inches. CH, 0% impaired. Functional Mobility: 
 
 
Functional Status Indep (I) Mod I Super-vision Min A Mod A Max A Total A Assist x2 Verbal cues Additional time Not tested Comments Rolling []  []  [] []    []    []  []  [] [] [] [x] Supine to sit []  []  [] []  []  []  []  [] [] [] [x] Sit to supine []  []  [] []  []  []  []  [] [] [] [x] Sit to stand [x]  []  [] []  []  []  []  [] [] [] []   
Stand to sit [x]  []  [] []  []  []  []  [] [] [] [] Bed to chair transfers [x]  []  [] []  []  []  []  [] [] [] [] Balance Good Myrtis Jacome Poor Unable Not tested Comments Sitting static [x]  []  []  []  [] Sitting dynamic [x]  []  []  []  []   
Standing static [x]  []  []  []  []   
Standing dynamic [x]  []  []  []  [] Mobility/Gait:  
Level of Assistance: Independent Assistive Device: none Distance Ambulated: 20 feet Left Lower Extremity: WBAT Right Lower Extremity: WBAT Base of Support: widened Speed/Evelyn: pace decreased (<100 feet/min) Step Length: left shortened and right shortened Swing Pattern: MIGUELItzCash Card Ltd.[facility] Centerwedgies [facility] Stance: time Gait Abnormalities: [facility] Stairs:  
Level of Assistance: Not assessed at this time Vital Signs Temp: 98.5 °F (36.9 °C) Pulse (Heart Rate): 96    
BP: (!) 180/109 Resp Rate: 22    
O2 Sat (%): 97 % Pain: 
Pre treatment pain level:0 Post treatment pain level:0 Pain Scale 1: Numeric (0 - 10) Pain Intensity 1: 0 Pain Location 1: Leg 
Pain Orientation 1: Left Pain Description 1: Aching Pain Intervention(s) 1: Medication (see MAR) Activity Tolerance:  
Good After treatment:  
Patient left in no apparent distress sitting up in chair Call bell left within reach Phu Chan PTA Time Calculation: 13 mins

## 2018-10-08 NOTE — PROGRESS NOTES
[address] [place], [place] [address]ty [facility] Hospitalist Division Inpatient Daily Progress Note Patient: Declan Robles MRN: [id number]  CSN: [id number] YOB: 1970  Age: 46 y.o. Sex: female DOA: 10/3/2018 LOS:  LOS: 5 days Chief Complaint:  Cellulitis Left lower ext Interval History:   
 
 Declan Robles is a 46 y.o. female with a PMHx of DM-II-diet controlled, HTN, CKD III, HLD, morbid obesity, who presented to the ED with 2 days of LLE pain and redness. She has a history of cellulitis on both extremities. She denied any fever or chills on this presentation nor any SOB or CP. She denies any recent sick contacts. She admits to area of broken skin on the lower part of her leg that opened up and drained two days ago. She decided to come in for further evaluation due to worsening pain. In the ED, she was found to have leukocytosis > 30 and JOON on CKD. CT imaging was done wo contrast that did not report concern for necrotizing fasciitis. ID consulted. IVAB (cefepime, vancomycin, clindamycin). 10/8/18: Cefepime d/c'd 2/2 suspect SA/Strep as main pathogen per ID notes, WBCs improving -will need PICC if d/c'ing on IVAB. BUN/Cr continues improve-will need diuresis at some point. ICS. Continue elevation b/l lower ext. Replace K. 
  
Subjective:  
  
NAD. Eating. Objective:  
  
Visit Vitals  BP (!) 180/109  Pulse 96  Temp 98.5 °F (36.9 °C)  Resp 22  
 Ht 5' (1.524 m)  Wt 145.2 kg (320 lb)  SpO2 97%  Breastfeeding No  
 BMI 62.5 kg/m2 Physical Exam: 
General appearance: alert, cooperative, no distress, appears stated age Lungs: clear to auscultation bilaterally Heart: regular rate and rhythm, S1, S2 normal 
Abdomen: soft, non tender, non distended. Normoactive bowel sounds. Extremities: extremities normal, atraumatic, b/l lower ext edema Skin: erythematous LLE, warmth present, tenderness noted Neurologic: Grossly normal 
PSY: Mood and affect normal, appropriately behaved Intake and Output: 
Current Shift:  10/08 0701 - 10/08 1900 In: -  
Out: 805 [INAOT:872] Last three shifts:  10/06 1901 - 10/08 0700 In: 3978.3 [P.O.:1080; I.V.:2898.3] Out: 2800 [Urine:2800] Recent Results (from the past 24 hour(s)) GLUCOSE, POC Collection Time: 10/07/18  4:01 PM  
Result Value Ref Range Glucose (POC) 105 70 - 110 mg/dL GLUCOSE, POC Collection Time: 10/07/18  9:59 PM  
Result Value Ref Range Glucose (POC) 101 70 - 110 mg/dL GLUCOSE, POC Collection Time: 10/08/18  7:31 AM  
Result Value Ref Range Glucose (POC) 90 70 - 110 mg/dL METABOLIC PANEL, BASIC Collection Time: 10/08/18  9:11 AM  
Result Value Ref Range Sodium 141 136 - 145 mmol/L Potassium 3.3 (L) 3.5 - 5.5 mmol/L Chloride 107 100 - 108 mmol/L  
 CO2 26 21 - 32 mmol/L Anion gap 8 3.0 - 18 mmol/L Glucose 78 74 - 99 mg/dL BUN 32 (H) 7.0 - 18 MG/DL Creatinine 2.21 (H) 0.6 - 1.3 MG/DL  
 BUN/Creatinine ratio 14 12 - 20 GFR est AA 28 (L) >60 ml/min/1.73m2 GFR est non-AA 24 (L) >60 ml/min/1.73m2 Calcium 8.4 (L) 8.5 - 10.1 MG/DL  
CBC WITH AUTOMATED DIFF Collection Time: 10/08/18  9:11 AM  
Result Value Ref Range WBC 13.9 (H) 4.6 - 13.2 K/uL  
 RBC 3.49 (L) 4.20 - 5.30 M/uL HGB 7.1 (L) 12.0 - 16.0 g/dL HCT 22.4 (L) 35.0 - 45.0 % MCV 64.2 (L) 74.0 - 97.0 FL  
 MCH 20.3 (L) 24.0 - 34.0 PG  
 MCHC 31.7 31.0 - 37.0 g/dL  
 RDW 17.0 (H) 11.6 - 14.5 % PLATELET 090 839 - 737 K/uL NEUTROPHILS PENDING % LYMPHOCYTES PENDING % MONOCYTES PENDING % EOSINOPHILS PENDING % BASOPHILS PENDING %  
 ABS. NEUTROPHILS PENDING K/UL  
 ABS. LYMPHOCYTES PENDING K/UL  
 ABS. MONOCYTES PENDING K/UL  
 ABS. EOSINOPHILS PENDING K/UL  
 ABS. BASOPHILS PENDING K/UL  
 DF PENDING   
GLUCOSE, POC Collection Time: 10/08/18 11:26 AM  
Result Value Ref Range Glucose (POC) 109 70 - 110 mg/dL Lab Results Component Value Date/Time Glucose 78 10/08/2018 09:11 AM  
 Glucose 65 (L) 10/07/2018 04:40 AM  
 Glucose 75 10/06/2018 04:24 AM  
 Glucose 78 10/05/2018 04:05 AM  
 Glucose 75 10/04/2018 05:05 AM  
  
Exam: CT left lower extremity without contrast 
  
INDICATION: Cellulitis of left lower extremity. Osteomyelitis suspected. 
  
COMPARISON: None 
  
TECHNIQUE: Noncontrast helical volumetric scanning was performed from just above 
the knee to just below the ankle. Axial, sagittal and coronal reconstructions 
were created. One or more dose reduction techniques were used on this CT: 
automated exposure control, adjustment of the mAs and/or kVp according to 
patient size, and iterative reconstruction techniques. The specific techniques 
used on this CT exam have been documented in the patient's electronic medical 
record. 
  
FINDINGS: 
Abundant subcutaneous fat is present. Diffuse subcutaneous fat stranding is 
present extending to the muscle fascia, mildly to moderately in the visualized 
distal lower upper leg, and proximal lower leg, becoming more prominent and 
quite extensive in the distal lower leg at and distal to the level of the ankle, 
particularly anteriorly and laterally. No organized fluid collections 
appreciated. The overlying skin thickening is present. No significant 
intermuscular low density within fat planes. Musculature appears similar in 
density throughout. 
  
No erosions or periosteal reaction. 
  
Subchondral cyst formation is present in the mid to lateral aspect of the 
femoral trochlea with small adjacent marginal osteophytes on both sides of the 
patellofemoral joint. 
  
No soft tissue gas. 
  
IMPRESSION IMPRESSION: 
Extensive subcutaneous edema, while nonspecific, is suggestive of the given 
history of cellulitis. 
  
No walled off fluid collection to suggest abscess. No soft tissue gas. 
  
No evidence of osteomyelitis. 
  
Chondromalacia of the femoral trochlea.    
 
Examination: Ultrasound of the left lower extremity, limited. 
  
HISTORY: Left leg pain. Cellulitis of the left lower extremity. 
  
TECHNIQUE: Real-time grayscale and color Doppler sonographic images over the 
region of clinical concern were performed by the technologist with 
representative images saved to PACS. 
  
COMPARISON: Correlation is made with CT scan of the left tibia and fibula 
performed 10/3/2018. 
  
FINDINGS: 
  
Sonographic images obtained from the anterior and distal aspect of the left 
lower extremity posterior extensive skin thickening and reticulation of 
subcutaneous fat tissues. No discrete organized fluid collection is 
demonstrated. Several superficial venous varicosities are demonstrated, similar 
to prior CT examination. 
  
IMPRESSION IMPRESSION: 
1. Considerable skin thickening and subcutaneous edema in keeping with 
underlying cellulitis. No organized or drainable fluid collection present on the 
provided images. Assessment/Plan:  
 
Patient Active Problem List  
Diagnosis Code  Chronic blood loss anemia D50.0  Acute blood loss anemia D62  Essential hypertension I10  Type 2 diabetes mellitus (HCC) E11.9  Sarcoidosis D86.9  CKD (chronic kidney disease) stage 3, GFR 30-59 ml/min ([facility]) N18.3  Acute on chronic renal failure (HCC) N17.9, N18.9  Fibroids D21.9  Depression F32.9  Morbid obesity ([facility]) E66.01  
 Cellulitis of right leg L03.115  
 JOON (acute kidney injury) ([facility] [address] N17.9  Chronic anemia D64.9  Bacteremia R78.81  
 Cellulitis L03.90 A/P: 
 
Cellulitis -IVAB (clindamycin, vancomycin) /cefepime d/c'd  
-ID consulted-appreciate 
-afebrile, leukocytosis improving  
-monitor  
-US non-vasc of indurated areas to r/o abscess formation-ok  
-blood cx NGTD Acute on chronic renal failure 
-BUN/Cr continue to improve 
-trend BMP 
-monitor I/O Chronic b/l edema 
-monitor 
-will need diuresis with improvement of kidney function DM II 
-monitor BS  
-SSI 
 
HTN 
-monitor BP 
-home medications Morbid obesity 
-educate diet/activity Chronic anemia 
-monitor h/h 
-transfuse hgb < 7 CHICO Moscoso, NP-C [facility] [phone] Office:  [phone]

## 2018-10-09 LAB
ANION GAP SERPL CALC-SCNC: 11 MMOL/L (ref 3–18)
BACTERIA SPEC CULT: NORMAL
BACTERIA SPEC CULT: NORMAL
BUN SERPL-MCNC: 29 MG/DL (ref 7–18)
BUN/CREAT SERPL: 14 (ref 12–20)
CALCIUM SERPL-MCNC: 8.6 MG/DL (ref 8.5–10.1)
CHLORIDE SERPL-SCNC: 106 MMOL/L (ref 100–108)
CO2 SERPL-SCNC: 23 MMOL/L (ref 21–32)
CREAT SERPL-MCNC: 2.08 MG/DL (ref 0.6–1.3)
ERYTHROCYTE [DISTWIDTH] IN BLOOD BY AUTOMATED COUNT: 16.9 % (ref 11.6–14.5)
GLUCOSE SERPL-MCNC: 85 MG/DL (ref 74–99)
HCT VFR BLD AUTO: 24.1 % (ref 35–45)
HGB BLD-MCNC: 7.3 G/DL (ref 12–16)
MCH RBC QN AUTO: 19.5 PG (ref 24–34)
MCHC RBC AUTO-ENTMCNC: 30.3 G/DL (ref 31–37)
MCV RBC AUTO: 64.4 FL (ref 74–97)
PLATELET # BLD AUTO: 326 K/UL (ref 135–420)
PMV BLD AUTO: 10.6 FL (ref 9.2–11.8)
POTASSIUM SERPL-SCNC: 3.7 MMOL/L (ref 3.5–5.5)
RBC # BLD AUTO: 3.74 M/UL (ref 4.2–5.3)
SERVICE CMNT-IMP: NORMAL
SERVICE CMNT-IMP: NORMAL
SODIUM SERPL-SCNC: 140 MMOL/L (ref 136–145)
WBC # BLD AUTO: 14.5 K/UL (ref 4.6–13.2)

## 2018-10-09 PROCEDURE — 94640 AIRWAY INHALATION TREATMENT: CPT

## 2018-10-09 PROCEDURE — 74011000250 HC RX REV CODE- 250: Performed by: HOSPITALIST

## 2018-10-09 PROCEDURE — 77010033678 HC OXYGEN DAILY

## 2018-10-09 PROCEDURE — 80048 BASIC METABOLIC PNL TOTAL CA: CPT | Performed by: NURSE PRACTITIONER

## 2018-10-09 PROCEDURE — 74011250637 HC RX REV CODE- 250/637: Performed by: HOSPITALIST

## 2018-10-09 PROCEDURE — 36415 COLL VENOUS BLD VENIPUNCTURE: CPT | Performed by: NURSE PRACTITIONER

## 2018-10-09 PROCEDURE — 74011250636 HC RX REV CODE- 250/636: Performed by: HOSPITALIST

## 2018-10-09 PROCEDURE — 85027 COMPLETE CBC AUTOMATED: CPT | Performed by: NURSE PRACTITIONER

## 2018-10-09 PROCEDURE — 94760 N-INVAS EAR/PLS OXIMETRY 1: CPT

## 2018-10-09 PROCEDURE — 97530 THERAPEUTIC ACTIVITIES: CPT

## 2018-10-09 PROCEDURE — 65270000029 HC RM PRIVATE

## 2018-10-09 RX ORDER — HYDRALAZINE HYDROCHLORIDE 20 MG/ML
20 INJECTION INTRAMUSCULAR; INTRAVENOUS
Status: DISCONTINUED | OUTPATIENT
Start: 2018-10-09 | End: 2018-10-10 | Stop reason: HOSPADM

## 2018-10-09 RX ADMIN — BUDESONIDE 500 MCG: 0.5 INHALANT RESPIRATORY (INHALATION) at 19:55

## 2018-10-09 RX ADMIN — FERROUS SULFATE TAB 325 MG (65 MG ELEMENTAL FE) 324 MG: 325 (65 FE) TAB at 07:19

## 2018-10-09 RX ADMIN — LABETALOL HYDROCHLORIDE 300 MG: 100 TABLET, FILM COATED ORAL at 09:52

## 2018-10-09 RX ADMIN — SODIUM CHLORIDE 100 ML/HR: 900 INJECTION, SOLUTION INTRAVENOUS at 04:19

## 2018-10-09 RX ADMIN — SIMVASTATIN 20 MG: 20 TABLET, FILM COATED ORAL at 22:49

## 2018-10-09 RX ADMIN — HEPARIN SODIUM 5000 UNITS: 5000 INJECTION INTRAVENOUS; SUBCUTANEOUS at 10:09

## 2018-10-09 RX ADMIN — ACETAMINOPHEN 650 MG: 325 TABLET, FILM COATED ORAL at 19:02

## 2018-10-09 RX ADMIN — CLINDAMYCIN PHOSPHATE 900 MG: 900 INJECTION, SOLUTION INTRAVENOUS at 10:06

## 2018-10-09 RX ADMIN — ARFORMOTEROL TARTRATE 15 MCG: 15 SOLUTION RESPIRATORY (INHALATION) at 19:55

## 2018-10-09 RX ADMIN — AMLODIPINE BESYLATE 10 MG: 10 TABLET ORAL at 09:52

## 2018-10-09 RX ADMIN — VENLAFAXINE HYDROCHLORIDE 150 MG: 75 CAPSULE, EXTENDED RELEASE ORAL at 09:52

## 2018-10-09 RX ADMIN — HEPARIN SODIUM 5000 UNITS: 5000 INJECTION INTRAVENOUS; SUBCUTANEOUS at 01:44

## 2018-10-09 RX ADMIN — CLINDAMYCIN PHOSPHATE 900 MG: 900 INJECTION, SOLUTION INTRAVENOUS at 01:44

## 2018-10-09 RX ADMIN — CLINDAMYCIN PHOSPHATE 900 MG: 900 INJECTION, SOLUTION INTRAVENOUS at 18:30

## 2018-10-09 RX ADMIN — ARFORMOTEROL TARTRATE 15 MCG: 15 SOLUTION RESPIRATORY (INHALATION) at 09:22

## 2018-10-09 RX ADMIN — BUDESONIDE 500 MCG: 0.5 INHALANT RESPIRATORY (INHALATION) at 09:22

## 2018-10-09 RX ADMIN — HEPARIN SODIUM 5000 UNITS: 5000 INJECTION INTRAVENOUS; SUBCUTANEOUS at 18:34

## 2018-10-09 RX ADMIN — ACYCLOVIR 400 MG: 800 TABLET ORAL at 09:52

## 2018-10-09 RX ADMIN — LABETALOL HYDROCHLORIDE 300 MG: 100 TABLET, FILM COATED ORAL at 18:32

## 2018-10-09 NOTE — PROGRESS NOTES
Problem: Mobility Impaired (Adult and Pediatric) Goal: *Acute Goals and Plan of Care (Insert Text) Physical Therapy Goals Initiated 10/5/2018 and to be accomplished within 7 day(s) 1. Patient will move from supine to sit and sit to supine in bed with modified independence. 2.  Patient will transfer from bed to chair and chair to bed with modified independence using the least restrictive device. 3.  Patient will perform sit to stand with modified independence. 4.  Patient will ambulate with modified independence for 100 feet with the least restrictive device. 5.  Patient will ascend/descend 6 stairs with handrail(s) with modified independence. Outcome: Progressing Towards Goal 
 
PHYSICAL THERAPY: Daily TREATMENT Note INPATIENT: Medicaid: Hospital Day: 7 Patient: Seth Chauhan (59 y.o. female)    Date: 10/9/2018 Primary Diagnosis: Cellulitis  
 ,  ,  
Precautions: Fall, Skin WBAT Chart, physical therapy assessment, plan of care and goals were reviewed. PLOF:Independent ASSESSMENT: 
Pt progressing well today, educated on importance of progressing with gait training and risks of prolonged immobility. Pt anxious about putting weight on LLE, provided RW for offloading as well as facilitation of increased activity tolerance. Pt instructed in safe use of RW for transfers and gait, pt with much improved activity tolerance, gait training X 70 ft with RW. Progression toward goals: 
      Improving appropriately and progressing toward goals PLAN: 
Patient continues to benefit from skilled intervention to address the above impairments. Continue treatment per established plan of care. EDUCATION:  
Education:  Patient was educated on the following topics: use of DME and activity pacing Barriers to Learning/Limitations: None Compensate with: visual, verbal, tactile, kinesthetic cues/model Discharge Recommendations:  Home Health Further Equipment Recommendations for Discharge:  wide/ bariatric rolling walker Factors which may impact discharge planning: none SUBJECTIVE:  
Patient stated I like this walker, I'm not as tired.  OBJECTIVE DATA SUMMARY:  
Critical Behavior: 
Neurologic State: Alert Orientation Level: Oriented X4 Cognition: Appropriate for age attention/concentration G CODE:Mobility [id number] Current  CI= 1-19%   Goal  CI= 1-19%. The severity rating is based on the Other KUSBS [address] Standing Balance Scale 
0: Pt performs 25% or less of standing activity (Max assist) CN, 100% impaired. 1: Pt supports self with upper extremities but requires therapist assistance. Pt performs 25-50% of effort (Mod assist) CM, 80% to <100% impaired. 1+: Pt supports self with upper extremities but requires therapist assistance. Pt performs >50% effort. (Min assist). CL, 60% to <80% impaired. 2: Pt supports self independently with both upper extremities (walker, crutches, parallel bars). CL, 60% to <80% impaired. 2+: Pt support self independently with 1 upper extremity (cane, crutch, 1 parallel bar). CK, 40% to <60% impaired. 3: Pt stands without upper extremity support for up to 30 seconds. CK, 40% to <60% impaired. 3+: Pt stands without upper extremity support for 30 seconds or greater. CJ, 20% to <40% impaired. 4: Pt independently moves and returns center of gravity 1-2 inches in one plane. CJ, 20% to <40% impaired. 4+: Pt independently moves and returns center of gravity 1-2 inches in multiple planes. CI, 1% to <20% impaired. 5: Pt independently moves and returns center of gravity in all planes greater than 2 inches. CH, 0% impaired. Functional Mobility: 
 
 
Functional Status Indep (I) Mod I Super-vision Min A Mod A Max A Total A Assist x2 Verbal cues Additional time Not tested Comments Rolling []  []  [x] []    []    []  []  [] [] [] [] Supine to sit []  []  [x] []  []  []  []  [] [] [] [] Sit to supine []  []  [x] []  []  []  []  [] [] [] [] Sit to stand []  []  [x] []  []  []  []  [] [] [] []   
Stand to sit []  []  [x] []  []  []  []  [] [] [] [] Bed to chair transfers []  []  [x] []  []  []  []  [] [] [] [] Balance Good Inud Doran Poor Unable Not tested Comments Sitting static [x]  []  []  []  [] Sitting dynamic [x]  []  []  []  []   
Standing static [x]  []  []  []  []   
Standing dynamic [x]  []  []  []  [] Mobility/Gait:  
Level of Assistance: Supervision Assistive Device: rolling walker Distance Ambulated: 70 feet Left Lower Extremity: WBAT Right Lower Extremity: WBAT Base of Support: widened Speed/Evelyn: pace decreased (<100 feet/min) Step Length: left shortened and right shortened Swing Pattern: [facility] Stance: time Gait Abnormalities: [facility] Stairs:  
Level of Assistance: not assessed at this time Vital Signs Temp: 97.6 °F (36.4 °C) Pulse (Heart Rate): 68    
BP: 159/85 Resp Rate: 21    
O2 Sat (%): 96 % Pain: 
Pre treatment pain level:0 Post treatment pain level:0 Pain Scale 1: Numeric (0 - 10) Pain Intensity 1: 0 Activity Tolerance:  
Good After treatment:  
Patient left in no apparent distress sitting up in chair Call bell left within reach Lisa Hunter PTA Time Calculation: 23 mins

## 2018-10-09 NOTE — PROGRESS NOTES
Problem: Pressure Injury - Risk of 
Goal: *Prevention of pressure injury Document Aureliano Scale and appropriate interventions in the flowsheet. Outcome: Progressing Towards Goal 
Pressure Injury Interventions: 
Sensory Interventions: Assess changes in LOC Activity Interventions: Increase time out of bed, Pressure redistribution bed/mattress(bed type) Mobility Interventions: HOB 30 degrees or less, Pressure redistribution bed/mattress (bed type) Nutrition Interventions: Document food/fluid/supplement intake

## 2018-10-09 NOTE — PROGRESS NOTES
Bedside shift report received from [facility] Patient up to bathroom with hekp Left foot up on pillows Bedside shift report   leslie Marquez with sbar

## 2018-10-09 NOTE — PROGRESS NOTES
Problem: Falls - Risk of 
Goal: *Absence of Falls Document Santiago Davila Fall Risk and appropriate interventions in the flowsheet. Outcome: Progressing Towards Goal 
Fall Risk Interventions: 
Mobility Interventions: Patient to call before getting OOB Medication Interventions: Patient to call before getting OOB, Teach patient to arise slowly Elimination Interventions: Patient to call for help with toileting needs, Toilet paper/wipes in reach

## 2018-10-09 NOTE — PROGRESS NOTES
[address] [place], [place] [address] Inpatient Daily Progress Note Patient: Janel Vora MRN: [id number]  CSN: [id number] YOB: 1970  Age: 46 y.o. Sex: female DOA: 10/3/2018 LOS:  LOS: 6 days Chief Complaint:  Cellulitis Left lower ext Interval History:   
 
 Janel Vora is a 46 y.o. female with a PMHx of DM-II-diet controlled, HTN, CKD III, HLD, morbid obesity, who presented to the ED with 2 days of LLE pain and redness. She has a history of cellulitis on both extremities. She denied any fever or chills on this presentation nor any SOB or CP. She denies any recent sick contacts. She admits to area of broken skin on the lower part of her leg that opened up and drained two days ago. She decided to come in for further evaluation due to worsening pain. In the ED, she was found to have leukocytosis > 30 and JOON on CKD. CT imaging was done wo contrast that did not report concern for necrotizing fasciitis. ID consulted. IVAB (cefepime, vancomycin, clindamycin). PVL LLE negative. 10/8/18: Cefepime d/c'd 2/2 suspect SA/Strep as main pathogen per ID notes, WBCs improving -will need PICC if d/c'ing on IVAB. BUN/Cr continues improve-will need diuresis at some point. ICS. Continue elevation b/l lower ext. Replace K. 
10/9/18: WBCs w/ slight elevation today. Afebrile. LLE with some improvement. Will await ID recommendations (if can send on PO antibx possible d/c home tomorrow). BUN/Cr continue to improve. D/c fluids. If WBCs stay elevated, will need additional coverage per ID notes.  
  
Subjective:  
  
NAD. Eating. Objective:  
  
Visit Vitals  /88 (BP 1 Location: Right arm, BP Patient Position: At rest)  Pulse 89  Temp 98 °F (36.7 °C)  Resp 21  
 Ht 5' (1.524 m)  Wt 145.2 kg (320 lb)  SpO2 98%  Breastfeeding No  
 BMI 62.5 kg/m2 Physical Exam: General appearance: alert, cooperative, no distress, appears stated age Lungs: clear to auscultation bilaterally Heart: regular rate and rhythm, S1, S2 normal 
Abdomen: soft, non tender, non distended. Normoactive bowel sounds. Extremities: extremities normal, atraumatic, b/l lower ext edema Skin: erythematous LLE, warmth present, tenderness noted (improving) Neurologic: Grossly normal 
PSY: Mood and affect normal, appropriately behaved Intake and Output: 
Current Shift:    
Last three shifts:  10/07 1901 - 10/09 0700 In: 4078.3 [P.O.:1380; I.V.:2698.3] Out: 2150 [Urine:2150] Recent Results (from the past 24 hour(s)) METABOLIC PANEL, BASIC Collection Time: 10/08/18  9:11 AM  
Result Value Ref Range Sodium 141 136 - 145 mmol/L Potassium 3.3 (L) 3.5 - 5.5 mmol/L Chloride 107 100 - 108 mmol/L  
 CO2 26 21 - 32 mmol/L Anion gap 8 3.0 - 18 mmol/L Glucose 78 74 - 99 mg/dL BUN 32 (H) 7.0 - 18 MG/DL Creatinine 2.21 (H) 0.6 - 1.3 MG/DL  
 BUN/Creatinine ratio 14 12 - 20 GFR est AA 28 (L) >60 ml/min/1.73m2 GFR est non-AA 24 (L) >60 ml/min/1.73m2 Calcium 8.4 (L) 8.5 - 10.1 MG/DL  
CBC WITH AUTOMATED DIFF Collection Time: 10/08/18  9:11 AM  
Result Value Ref Range WBC 13.9 (H) 4.6 - 13.2 K/uL  
 RBC 3.49 (L) 4.20 - 5.30 M/uL HGB 7.1 (L) 12.0 - 16.0 g/dL HCT 22.4 (L) 35.0 - 45.0 % MCV 64.2 (L) 74.0 - 97.0 FL  
 MCH 20.3 (L) 24.0 - 34.0 PG  
 MCHC 31.7 31.0 - 37.0 g/dL  
 RDW 17.0 (H) 11.6 - 14.5 % PLATELET 405 633 - 673 K/uL NEUTROPHILS 83 (H) 40 - 73 % LYMPHOCYTES 7 (L) 21 - 52 % MONOCYTES 8 3 - 10 % EOSINOPHILS 2 0 - 5 % BASOPHILS 0 0 - 2 %  
 ABS. NEUTROPHILS 11.5 (H) 1.8 - 8.0 K/UL  
 ABS. LYMPHOCYTES 1.0 0.9 - 3.6 K/UL  
 ABS. MONOCYTES 1.1 0.05 - 1.2 K/UL  
 ABS. EOSINOPHILS 0.3 0.0 - 0.4 K/UL  
 ABS. BASOPHILS 0.0 0.0 - 0.1 K/UL PLATELET COMMENTS LARGE PLATELETS    
 RBC COMMENTS ANISOCYTOSIS 1+ 
    
 RBC COMMENTS HYPOCHROMIA 3+ 
 RBC COMMENTS MICROCYTOSIS 2+ 
    
 DF AUTOMATED    
GLUCOSE, POC Collection Time: 10/08/18 11:26 AM  
Result Value Ref Range Glucose (POC) 109 70 - 110 mg/dL GLUCOSE, POC Collection Time: 10/08/18  9:33 PM  
Result Value Ref Range Glucose (POC) 121 (H) 70 - 110 mg/dL CBC W/O DIFF Collection Time: 10/09/18  5:22 AM  
Result Value Ref Range WBC 14.5 (H) 4.6 - 13.2 K/uL  
 RBC 3.74 (L) 4.20 - 5.30 M/uL HGB 7.3 (L) 12.0 - 16.0 g/dL HCT 24.1 (L) 35.0 - 45.0 % MCV 64.4 (L) 74.0 - 97.0 FL  
 MCH 19.5 (L) 24.0 - 34.0 PG  
 MCHC 30.3 (L) 31.0 - 37.0 g/dL  
 RDW 16.9 (H) 11.6 - 14.5 % PLATELET 005 906 - 026 K/uL MPV 10.6 9.2 - 11.8 FL Lab Results Component Value Date/Time Glucose 78 10/08/2018 09:11 AM  
 Glucose 65 (L) 10/07/2018 04:40 AM  
 Glucose 75 10/06/2018 04:24 AM  
 Glucose 78 10/05/2018 04:05 AM  
 Glucose 75 10/04/2018 05:05 AM  
  
Exam: CT left lower extremity without contrast 
  
INDICATION: Cellulitis of left lower extremity. Osteomyelitis suspected. 
  
COMPARISON: None 
  
TECHNIQUE: Noncontrast helical volumetric scanning was performed from just above 
the knee to just below the ankle. Axial, sagittal and coronal reconstructions 
were created. One or more dose reduction techniques were used on this CT: 
automated exposure control, adjustment of the mAs and/or kVp according to 
patient size, and iterative reconstruction techniques. The specific techniques 
used on this CT exam have been documented in the patient's electronic medical 
record. 
  
FINDINGS: 
Abundant subcutaneous fat is present. Diffuse subcutaneous fat stranding is 
present extending to the muscle fascia, mildly to moderately in the visualized 
distal lower upper leg, and proximal lower leg, becoming more prominent and 
quite extensive in the distal lower leg at and distal to the level of the ankle, 
particularly anteriorly and laterally. No organized fluid collections appreciated. The overlying skin thickening is present. No significant 
intermuscular low density within fat planes. Musculature appears similar in 
density throughout. 
  
No erosions or periosteal reaction. 
  
Subchondral cyst formation is present in the mid to lateral aspect of the 
femoral trochlea with small adjacent marginal osteophytes on both sides of the 
patellofemoral joint. 
  
No soft tissue gas. 
  
IMPRESSION IMPRESSION: 
Extensive subcutaneous edema, while nonspecific, is suggestive of the given 
history of cellulitis. 
  
No walled off fluid collection to suggest abscess. No soft tissue gas. 
  
No evidence of osteomyelitis. 
  
Chondromalacia of the femoral trochlea.  
   
 
Examination: Ultrasound of the left lower extremity, limited. 
  
HISTORY: Left leg pain. Cellulitis of the left lower extremity. 
  
TECHNIQUE: Real-time grayscale and color Doppler sonographic images over the 
region of clinical concern were performed by the technologist with 
representative images saved to PACS. 
  
COMPARISON: Correlation is made with CT scan of the left tibia and fibula 
performed 10/3/2018. 
  
FINDINGS: 
  
Sonographic images obtained from the anterior and distal aspect of the left 
lower extremity posterior extensive skin thickening and reticulation of 
subcutaneous fat tissues. No discrete organized fluid collection is 
demonstrated. Several superficial venous varicosities are demonstrated, similar 
to prior CT examination. 
  
IMPRESSION IMPRESSION: 
1. Considerable skin thickening and subcutaneous edema in keeping with 
underlying cellulitis. No organized or drainable fluid collection present on the 
provided images. Assessment/Plan:  
 
Patient Active Problem List  
Diagnosis Code  Chronic blood loss anemia D50.0  Acute blood loss anemia D62  Essential hypertension I10  Type 2 diabetes mellitus (HCC) E11.9  Sarcoidosis D86.9  CKD (chronic kidney disease) stage 3, GFR 30-59 ml/min ([facility]) N18.3  Acute on chronic renal failure (HCC) N17.9, N18.9  Fibroids D21.9  Depression F32.9  Morbid obesity ([facility]) E66.01  
 Cellulitis of right leg L03.115  
 JOON (acute kidney injury) ([facility] [address] N17.9  Chronic anemia D64.9  Bacteremia R78.81  
 Cellulitis L03.90  Bilateral edema of lower extremity R60.0 A/P: 
 
Cellulitis -IVAB (clindamycin, vancomycin) /cefepime d/c'd (await GI recommendations-if can send home tomorrow on PO antibx); if WBCs stay elevated will need additional coverage  
-ID consulted-appreciate 
-afebrile, leukocytosis w/ slight jump 10/9  
-monitor  
-US non-vasc of indurated areas to r/o abscess formation-ok  
-blood cx NGTD Acute on chronic renal failure 
-BUN/Cr continue to slowly improve 
-trend BMP 
-monitor I/O Chronic b/l edema 
-monitor 
-will need diuresis with improvement of kidney function DM II 
-accuchecks  
-monitor BS  
-SSI 
 
HTN 
-monitor BP 
-home medications  
-will add hydralazine PRN Morbid obesity 
-educate diet/activity Chronic anemia 
-monitor h/h 
-transfuse hgb < 7 CHICO Mir, NP-C [facility] [phone] Office:  [phone]

## 2018-10-09 NOTE — PROGRESS NOTES
1920: Received patient  In  bed awake,alert and oriented  x 4. No signs of distress. Left leg elevated with 2 pillows. Bed low and locked. Call bell within reach. 0630: In bed resting quietly,uneventful night,no other concern at this time. Call bell within reach. Will continue monitoring.

## 2018-10-09 NOTE — PROGRESS NOTES
Plan at this time is home with hh and specific orderes will be needed for IV abx and wound care if indicated

## 2018-10-09 NOTE — PROGRESS NOTES
Infectious Disease Follow-up Admit Date: 10/3/2018 Current abx Prior abx Vanco/clinda 10/3-6  10/3 Ceftriaxone x 1 zosyn 10/3-2, cefepime 10/5-3 Assessment ->Rec:  
 
Sepsis poa- wbc 34k low grade fever joon d/t cellulitis ->wbc remains elevated, slightly worse than yesterday 
-> ? component of GN as cefepime was dced yesterday. Monitor for another day as clinically stable. If rise in WBc again- will have to add coverage 
-> Afebrile and stable clinically 
->monitor bctx's, ngtd LLE cellulitis- CT done in ER read as edema c/w cellulitis no gas abscess or OM  
-reports began 2 days PTA ? Insect bite with pus treated peroxide  
-tender L inguinal likely reactive lymphadenitis, faint tender erythema with indurated areas without drainage foot to above knee, no crepitance (+) DP, 3+ edema, R leg less edema (+) scarring  ->monitor on abx, slowly improving 
->cont Vanc/clinda for now 
-> d/w Dr Carmela Heath Chronic ble edema  ->diuresis, d/w pt to elevate b/l LE at heart level H/o Gp G beta strep BSI from RLE cellulitis 7/28, with sepsis joon then- seen by Dr. Erin Lilly, treated initially iv abx out 8/1 on 10 day course of keflex  ->monitor bctx's - NTD JOON on CKD3 Cr 4.7 ->4.2->4 ->2.21 now -- was 1.6 7/31  ->dose abx for reduced eGFR 
  
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   
 
MICROBIOLOGY:  
10/3 bctx x 2 ngtd LINES AND CATHETERS:  
piv Active Hospital Problems Diagnosis Date Noted  Bilateral edema of lower extremity 10/08/2018  Cellulitis 10/03/2018  Chronic anemia 07/28/2018  Acute on chronic renal failure ([facility] 09/02/2013 Baseline Cr 1.3, Cr of 3.79 with eGFR of 14 on admission.  CKD (chronic kidney disease) stage 3, GFR 30-59 ml/min ([facility]) 09/02/2013 Baseline Cr ~1.3, eGFR ~47, Nephrologist Dr. Jose David Hart.  Morbid obesity ([facility] 09/02/2013 BMI 42 with hypertension and type 2 DM.  Type 2 diabetes mellitus ([facility] [address] 09/02/2013  Essential hypertension 09/02/2013 Subjective: Interval notes reviewed. Pt says her leg slowly improving, does have some redness and warmth still with tenderness, but better than what she presented with, No fever, trying to keep legs elevated. No family at bedside. Current Facility-Administered Medications Medication Dose Route Frequency  hydrALAZINE (APRESOLINE) 20 mg/mL injection 20 mg  20 mg IntraVENous Q6H PRN  
 amLODIPine (NORVASC) tablet 10 mg  10 mg Oral DAILY  vancomycin (VANCOCIN) 1500 mg in  ml infusion  1,500 mg IntraVENous Q48H  
 [START ON 10/10/2018] VANCOMYCIN INFORMATION NOTE   Other ONCE  
 sodium chloride (NS) flush 5-10 mL  5-10 mL IntraVENous PRN  
 labetalol (NORMODYNE) tablet 300 mg  300 mg Oral BID  simvastatin (ZOCOR) tablet 20 mg  20 mg Oral QHS  venlafaxine-SR (EFFEXOR-XR) capsule 150 mg  150 mg Oral DAILY WITH BREAKFAST  ferrous sulfate tablet 324 mg  324 mg Oral ACB  acyclovir (ZOVIRAX) tablet 400 mg  400 mg Oral DAILY  sodium chloride (NS) flush 5-10 mL  5-10 mL IntraVENous PRN  
 acetaminophen (TYLENOL) tablet 650 mg  650 mg Oral Q4H PRN  
 morphine injection 2 mg  2 mg IntraVENous Q3H PRN  
 heparin (porcine) injection 5,000 Units  5,000 Units SubCUTAneous Q8H  
 insulin lispro (HUMALOG) injection   SubCUTAneous AC&HS  
 glucose chewable tablet 16 g  4 Tab Oral PRN  
 glucagon (GLUCAGEN) injection 1 mg  1 mg IntraMUSCular PRN  
 dextrose (D50) infusion 12.5-25 g  25-50 mL IntraVENous PRN  
 VANCOMYCIN INFORMATION NOTE   Other Rx Dosing/Monitoring  clindamycin (CLEOCIN) 900mg D5W 50mL IVPB (premix)  900 mg IntraVENous Q8H  
 budesonide (PULMICORT) 500 mcg/2 ml nebulizer suspension  500 mcg Nebulization BID RT  
 arformoterol (BROVANA) neb solution 15 mcg  15 mcg Nebulization BID RT  
 
 
 
Objective:  
 
Visit Vitals  /85 (BP 1 Location: Right arm, BP Patient Position: At rest)  Pulse 68  Temp 97.6 °F (36.4 °C)  Resp 21  
 Ht 5' (1.524 m)  Wt 145.2 kg (320 lb)  SpO2 96%  Breastfeeding No  
 BMI 62.5 kg/m2 Temp (24hrs), Av.2 °F (36.8 °C), Min:97.6 °F (36.4 °C), Max:98.6 °F (37 °C) GEN: obese BF sitting on side of bed, NAD HEENT: anicteric no thrush NECK: supple trachea midline CHEST: no rales rhonchi or wheeze CVS: RRR no murmurs ABD: soft obese (+) BS non-tender EXT: decreased erythema LLE knee to foot, no drainage 3+ edema no crepitance , warmth present, tenderness improved, raised circular large lesions but without any DX. Labs: Results:  
Chemistry Recent Labs 10/09/18 
 0522  10/08/18 
 0911  10/07/18 
 0440 GLU  85  78  65* NA  140  141  140  
K  3.7  3.3*  4.1 CL  106  107  106 CO2  23  26  20* BUN  29*  32*  39* CREA  2.08*  2.21*  2.59* CA  8.6  8.4*  8.4* AGAP  11  8  14 BUCR  14  14  15 CBC w/Diff Recent Labs 10/09/18 
 0522  10/08/18 
 0911  10/07/18 
 0440 WBC  14.5*  13.9*  13.8*  
RBC  3.74*  3.49*  3.46* HGB  7.3*  7.1*  7.0*  
HCT  24.1*  22.4*  22.4*  
PLT  326  238  210 GRANS   --   83*  81* LYMPH   --   7*  8* EOS   --   2  2 Microbiology Results No results for input(s): SDES, CULT in the last 72 hours. Garrett Schwarz MD 
2018 [facility] [phone]

## 2018-10-09 NOTE — PROGRESS NOTES
Problem: Falls - Risk of 
Goal: *Absence of Falls Document Herman Resendez Fall Risk and appropriate interventions in the flowsheet. Outcome: Progressing Towards Goal 
Fall Risk Interventions: 
Mobility Interventions: Patient to call before getting OOB Medication Interventions: Patient to call before getting OOB Elimination Interventions: Patient to call for help with toileting needs

## 2018-10-09 NOTE — PROGRESS NOTES
Problem: Falls - Risk of 
Goal: *Absence of Falls Document Judson Lozada Fall Risk and appropriate interventions in the flowsheet. Outcome: Progressing Towards Goal 
Fall Risk Interventions: 
Mobility Interventions: Assess mobility with egress test 
 
  
 
Medication Interventions: Evaluate medications/consider consulting pharmacy Elimination Interventions: Call light in reach

## 2018-10-10 ENCOUNTER — HOME HEALTH ADMISSION (OUTPATIENT)
Dept: HOME HEALTH SERVICES | Facility: HOME HEALTH | Age: 48
End: 2018-10-10

## 2018-10-10 VITALS
SYSTOLIC BLOOD PRESSURE: 160 MMHG | WEIGHT: 293 LBS | DIASTOLIC BLOOD PRESSURE: 77 MMHG | TEMPERATURE: 98.6 F | HEIGHT: 60 IN | BODY MASS INDEX: 57.52 KG/M2 | RESPIRATION RATE: 20 BRPM | HEART RATE: 80 BPM | OXYGEN SATURATION: 94 %

## 2018-10-10 LAB
ANION GAP SERPL CALC-SCNC: 8 MMOL/L (ref 3–18)
BACTERIA SPEC CULT: NORMAL
BUN SERPL-MCNC: 23 MG/DL (ref 7–18)
BUN/CREAT SERPL: 13 (ref 12–20)
CALCIUM SERPL-MCNC: 8.6 MG/DL (ref 8.5–10.1)
CHLORIDE SERPL-SCNC: 109 MMOL/L (ref 100–108)
CO2 SERPL-SCNC: 25 MMOL/L (ref 21–32)
CREAT SERPL-MCNC: 1.83 MG/DL (ref 0.6–1.3)
DATE LAST DOSE: ABNORMAL
ERYTHROCYTE [DISTWIDTH] IN BLOOD BY AUTOMATED COUNT: 17.1 % (ref 11.6–14.5)
GLUCOSE SERPL-MCNC: 86 MG/DL (ref 74–99)
HCT VFR BLD AUTO: 22.3 % (ref 35–45)
HGB BLD-MCNC: 7 G/DL (ref 12–16)
MCH RBC QN AUTO: 20.1 PG (ref 24–34)
MCHC RBC AUTO-ENTMCNC: 31.4 G/DL (ref 31–37)
MCV RBC AUTO: 63.9 FL (ref 74–97)
PLATELET # BLD AUTO: 277 K/UL (ref 135–420)
PMV BLD AUTO: 10 FL (ref 9.2–11.8)
POTASSIUM SERPL-SCNC: 3.4 MMOL/L (ref 3.5–5.5)
RBC # BLD AUTO: 3.49 M/UL (ref 4.2–5.3)
REPORTED DOSE,DOSE: ABNORMAL UNITS
REPORTED DOSE/TIME,TMG: 300
SERVICE CMNT-IMP: NORMAL
SODIUM SERPL-SCNC: 142 MMOL/L (ref 136–145)
VANCOMYCIN TROUGH SERPL-MCNC: 9.7 UG/ML (ref 10–20)
WBC # BLD AUTO: 12 K/UL (ref 4.6–13.2)

## 2018-10-10 PROCEDURE — 74011000250 HC RX REV CODE- 250: Performed by: HOSPITALIST

## 2018-10-10 PROCEDURE — 85027 COMPLETE CBC AUTOMATED: CPT | Performed by: NURSE PRACTITIONER

## 2018-10-10 PROCEDURE — 74011250636 HC RX REV CODE- 250/636: Performed by: HOSPITALIST

## 2018-10-10 PROCEDURE — 80048 BASIC METABOLIC PNL TOTAL CA: CPT | Performed by: NURSE PRACTITIONER

## 2018-10-10 PROCEDURE — 80202 ASSAY OF VANCOMYCIN: CPT | Performed by: HOSPITALIST

## 2018-10-10 PROCEDURE — 36415 COLL VENOUS BLD VENIPUNCTURE: CPT | Performed by: HOSPITALIST

## 2018-10-10 PROCEDURE — 74011250637 HC RX REV CODE- 250/637: Performed by: HOSPITALIST

## 2018-10-10 PROCEDURE — 87641 MR-STAPH DNA AMP PROBE: CPT | Performed by: INTERNAL MEDICINE

## 2018-10-10 PROCEDURE — 94640 AIRWAY INHALATION TREATMENT: CPT

## 2018-10-10 PROCEDURE — 97116 GAIT TRAINING THERAPY: CPT

## 2018-10-10 RX ORDER — CLINDAMYCIN PHOSPHATE 900 MG/50ML
900 INJECTION INTRAVENOUS EVERY 8 HOURS
Status: DISCONTINUED | OUTPATIENT
Start: 2018-10-10 | End: 2018-10-10 | Stop reason: HOSPADM

## 2018-10-10 RX ORDER — POTASSIUM CHLORIDE 20 MEQ/1
20 TABLET, EXTENDED RELEASE ORAL
Status: COMPLETED | OUTPATIENT
Start: 2018-10-10 | End: 2018-10-10

## 2018-10-10 RX ORDER — POTASSIUM CHLORIDE 7.45 MG/ML
10 INJECTION INTRAVENOUS
Status: ACTIVE | OUTPATIENT
Start: 2018-10-10 | End: 2018-10-10

## 2018-10-10 RX ORDER — CLINDAMYCIN HYDROCHLORIDE 300 MG/1
300 CAPSULE ORAL 3 TIMES DAILY
Qty: 21 CAP | Refills: 0 | Status: SHIPPED | OUTPATIENT
Start: 2018-10-10 | End: 2019-05-30

## 2018-10-10 RX ORDER — AMLODIPINE BESYLATE 10 MG/1
10 TABLET ORAL DAILY
Qty: 30 TAB | Refills: 0 | Status: SHIPPED | OUTPATIENT
Start: 2018-10-11 | End: 2020-04-10

## 2018-10-10 RX ORDER — VANCOMYCIN/0.9 % SOD CHLORIDE 1.5G/250ML
1500 PLASTIC BAG, INJECTION (ML) INTRAVENOUS
Status: DISCONTINUED | OUTPATIENT
Start: 2018-10-11 | End: 2018-10-10 | Stop reason: HOSPADM

## 2018-10-10 RX ADMIN — BUDESONIDE 500 MCG: 0.5 INHALANT RESPIRATORY (INHALATION) at 07:17

## 2018-10-10 RX ADMIN — VENLAFAXINE HYDROCHLORIDE 150 MG: 75 CAPSULE, EXTENDED RELEASE ORAL at 09:28

## 2018-10-10 RX ADMIN — ARFORMOTEROL TARTRATE 15 MCG: 15 SOLUTION RESPIRATORY (INHALATION) at 07:17

## 2018-10-10 RX ADMIN — POTASSIUM CHLORIDE 20 MEQ: 20 TABLET, EXTENDED RELEASE ORAL at 13:28

## 2018-10-10 RX ADMIN — CLINDAMYCIN PHOSPHATE 900 MG: 900 INJECTION, SOLUTION INTRAVENOUS at 09:28

## 2018-10-10 RX ADMIN — VANCOMYCIN HYDROCHLORIDE 1500 MG: 10 INJECTION, POWDER, LYOPHILIZED, FOR SOLUTION INTRAVENOUS at 02:51

## 2018-10-10 RX ADMIN — ACETAMINOPHEN 650 MG: 325 TABLET, FILM COATED ORAL at 06:31

## 2018-10-10 RX ADMIN — CLINDAMYCIN PHOSPHATE 900 MG: 900 INJECTION, SOLUTION INTRAVENOUS at 02:07

## 2018-10-10 RX ADMIN — AMLODIPINE BESYLATE 10 MG: 10 TABLET ORAL at 09:28

## 2018-10-10 RX ADMIN — HEPARIN SODIUM 5000 UNITS: 5000 INJECTION INTRAVENOUS; SUBCUTANEOUS at 09:28

## 2018-10-10 RX ADMIN — ACYCLOVIR 400 MG: 800 TABLET ORAL at 09:28

## 2018-10-10 RX ADMIN — HEPARIN SODIUM 5000 UNITS: 5000 INJECTION INTRAVENOUS; SUBCUTANEOUS at 02:08

## 2018-10-10 RX ADMIN — FERROUS SULFATE TAB 325 MG (65 MG ELEMENTAL FE) 324 MG: 325 (65 FE) TAB at 06:33

## 2018-10-10 RX ADMIN — LABETALOL HYDROCHLORIDE 300 MG: 100 TABLET, FILM COATED ORAL at 09:28

## 2018-10-10 NOTE — PROGRESS NOTES
Bedside and Verbal shift change report given to BRET Paniagua (oncoming nurse) by Brendon Grover (offgoing nurse). Report included the following information SBAR, Kardex, MAR and Recent Results.

## 2018-10-10 NOTE — PROGRESS NOTES
Spoke with DR. Liliana Hall and he is discharging patient today and have in formed him of ID's recommendations. Have ordered Home health per Dr. Liliana Hall.

## 2018-10-10 NOTE — PROGRESS NOTES
Problem: Falls - Risk of 
Goal: *Absence of Falls Document Charlene Mitchell Fall Risk and appropriate interventions in the flowsheet. Outcome: Progressing Towards Goal 
Fall Risk Interventions: 
Mobility Interventions: Patient to call before getting OOB Medication Interventions: Patient to call before getting OOB, Teach patient to arise slowly Elimination Interventions: Patient to call for help with toileting needs, Toilet paper/wipes in reach Problem: Pressure Injury - Risk of 
Goal: *Prevention of pressure injury Document Aureliano Scale and appropriate interventions in the flowsheet. Outcome: Progressing Towards Goal 
Pressure Injury Interventions: 
Sensory Interventions: Assess changes in LOC Activity Interventions: Increase time out of bed, Pressure redistribution bed/mattress(bed type) Mobility Interventions: HOB 30 degrees or less, Pressure redistribution bed/mattress (bed type) Nutrition Interventions: Document food/fluid/supplement intake

## 2018-10-10 NOTE — PROGRESS NOTES
[address] [place], [place] [address]ty [facility]ist Division Inpatient Daily Progress Note Patient: Donya Granados MRN: [id number]  CSN: [id number] YOB: 1970  Age: 46 y.o. Sex: female DOA: 10/3/2018 LOS:  LOS: 7 days Chief Complaint:  Cellulitis Left lower ext Interval History:   
 
 Donya Granadso is a 46 y.o. female with a PMHx of DM-II-diet controlled, HTN, CKD III, HLD, morbid obesity, who presented to the ED with 2 days of LLE pain and redness. She has a history of cellulitis on both extremities. She denied any fever or chills on this presentation nor any SOB or CP. She denies any recent sick contacts. She admits to area of broken skin on the lower part of her leg that opened up and drained two days ago. She decided to come in for further evaluation due to worsening pain. In the ED, she was found to have leukocytosis > 30 and JOON on CKD. CT imaging was done wo contrast that did not report concern for necrotizing fasciitis. ID consulted. IVAB (cefepime, vancomycin, clindamycin). PVL LLE negative. 10/8/18: Cefepime d/c'd 2/2 suspect SA/Strep as main pathogen per ID notes, WBCs improving -will need PICC if d/c'ing on IVAB. BUN/Cr continues improve-will need diuresis at some point. ICS. Continue elevation b/l lower ext. Replace K. 
10/9/18: WBCs w/ slight elevation today. Afebrile. Elevate legs. LLE with some improvement. Will await ID recommendations (if can send on PO antibx possible d/c home tomorrow). BUN/Cr continue to improve. D/c fluids. If WBCs stay elevated, will need additional coverage per ID notes. 10/10/18: WBCs with improvement today. Afebrile. Elevate legs. LLE still showing improvement. ID recommending to MRSA swab and monitor overnight. Will await results and continue IVAB. BUN/Cr continue improving. Replace K, recheck CBC, BMP in am. Monitor h/h-on low side, will transfuse < 7  
  
Subjective:  
  
NAD. Objective: Visit Vitals  BP (!) 148/103 (BP 1 Location: Left arm, BP Patient Position: Pre-activity)  Pulse 84  Temp 98.6 °F (37 °C)  Resp 24  
 Ht 5' (1.524 m)  Wt 145.2 kg (320 lb)  SpO2 99%  Breastfeeding No  
 BMI 62.5 kg/m2 Physical Exam: 
General appearance: alert, cooperative Lungs: clear to auscultation bilaterally Heart: regular rate and rhythm, S1, S2 normal 
Abdomen: soft, non tender, non distended. Normoactive bowel sounds. Extremities: extremities normal, atraumatic, b/l lower ext edema Skin: erythematous LLE, warmth present, tenderness noted (improving) Neurologic: Grossly normal 
PSY: Mood and affect normal, appropriately behaved Intake and Output: 
Current Shift:  10/10 0701 - 10/10 1900 In: 240 [P.O.:240] Out: - Last three shifts:  10/08 1901 - 10/10 0700 In: 2915 [P.O.:1915; I.V.:1000] Out: 1525 [[id number]:[id number]] Recent Results (from the past 24 hour(s)) Danuta Obrien Collection Time: 10/10/18  1:00 AM  
Result Value Ref Range Vancomycin,trough 9.7 (L) 10.0 - 20.0 ug/mL Reported dose date: [id number] Reported dose time: 300 Reported dose: 1500 MG UNITS  
CBC W/O DIFF Collection Time: 10/10/18  8:18 AM  
Result Value Ref Range WBC 12.0 4.6 - 13.2 K/uL  
 RBC 3.49 (L) 4.20 - 5.30 M/uL HGB 7.0 (L) 12.0 - 16.0 g/dL HCT 22.3 (L) 35.0 - 45.0 % MCV 63.9 (L) 74.0 - 97.0 FL  
 MCH 20.1 (L) 24.0 - 34.0 PG  
 MCHC 31.4 31.0 - 37.0 g/dL  
 RDW 17.1 (H) 11.6 - 14.5 % PLATELET 580 907 - 727 K/uL MPV 10.0 9.2 - 61.0 FL  
METABOLIC PANEL, BASIC Collection Time: 10/10/18  8:18 AM  
Result Value Ref Range Sodium 142 136 - 145 mmol/L Potassium 3.4 (L) 3.5 - 5.5 mmol/L Chloride 109 (H) 100 - 108 mmol/L  
 CO2 25 21 - 32 mmol/L Anion gap 8 3.0 - 18 mmol/L Glucose 86 74 - 99 mg/dL BUN 23 (H) 7.0 - 18 MG/DL  Creatinine 1.83 (H) 0.6 - 1.3 MG/DL  
 BUN/Creatinine ratio 13 12 - 20    
 GFR est AA 35 (L) >60 ml/min/1.73m2 GFR est non-AA 29 (L) >60 ml/min/1.73m2 Calcium 8.6 8.5 - 10.1 MG/DL Lab Results Component Value Date/Time Glucose 86 10/10/2018 08:18 AM  
 Glucose 85 10/09/2018 05:22 AM  
 Glucose 78 10/08/2018 09:11 AM  
 Glucose 65 (L) 10/07/2018 04:40 AM  
 Glucose 75 10/06/2018 04:24 AM  
  
Exam: CT left lower extremity without contrast 
  
INDICATION: Cellulitis of left lower extremity. Osteomyelitis suspected. 
  
COMPARISON: None 
  
TECHNIQUE: Noncontrast helical volumetric scanning was performed from just above 
the knee to just below the ankle. Axial, sagittal and coronal reconstructions 
were created. One or more dose reduction techniques were used on this CT: 
automated exposure control, adjustment of the mAs and/or kVp according to 
patient size, and iterative reconstruction techniques. The specific techniques 
used on this CT exam have been documented in the patient's electronic medical 
record. 
  
FINDINGS: 
Abundant subcutaneous fat is present. Diffuse subcutaneous fat stranding is 
present extending to the muscle fascia, mildly to moderately in the visualized 
distal lower upper leg, and proximal lower leg, becoming more prominent and 
quite extensive in the distal lower leg at and distal to the level of the ankle, 
particularly anteriorly and laterally. No organized fluid collections 
appreciated. The overlying skin thickening is present. No significant 
intermuscular low density within fat planes. Musculature appears similar in 
density throughout. 
  
No erosions or periosteal reaction. 
  
Subchondral cyst formation is present in the mid to lateral aspect of the 
femoral trochlea with small adjacent marginal osteophytes on both sides of the 
patellofemoral joint. 
  
No soft tissue gas. 
  
IMPRESSION IMPRESSION: 
Extensive subcutaneous edema, while nonspecific, is suggestive of the given 
history of cellulitis. 
  
 No walled off fluid collection to suggest abscess. No soft tissue gas. 
  
No evidence of osteomyelitis. 
  
Chondromalacia of the femoral trochlea.  
   
 
Examination: Ultrasound of the left lower extremity, limited. 
  
HISTORY: Left leg pain. Cellulitis of the left lower extremity. 
  
TECHNIQUE: Real-time grayscale and color Doppler sonographic images over the 
region of clinical concern were performed by the technologist with 
representative images saved to PACS. 
  
COMPARISON: Correlation is made with CT scan of the left tibia and fibula 
performed 10/3/2018. 
  
FINDINGS: 
  
Sonographic images obtained from the anterior and distal aspect of the left 
lower extremity posterior extensive skin thickening and reticulation of 
subcutaneous fat tissues. No discrete organized fluid collection is 
demonstrated. Several superficial venous varicosities are demonstrated, similar 
to prior CT examination. 
  
IMPRESSION IMPRESSION: 
1. Considerable skin thickening and subcutaneous edema in keeping with 
underlying cellulitis. No organized or drainable fluid collection present on the 
provided images. Assessment/Plan:  
 
Patient Active Problem List  
Diagnosis Code  Chronic blood loss anemia D50.0  Acute blood loss anemia D62  Essential hypertension I10  Type 2 diabetes mellitus ([facility]) E11.9  Sarcoidosis D86.9  CKD (chronic kidney disease) stage 3, GFR 30-59 ml/min ([facility]) N18.3  Acute on chronic renal failure ([facility]) N17.9, N18.9  Fibroids D21.9  Depression F32.9  Morbid obesity ([facility]) E66.01  
 Cellulitis of right leg L03.115  
 JOON (acute kidney injury) ([facility] [address] N17.9  Chronic anemia D64.9  Bacteremia R78.81  
 Cellulitis L03.90  Bilateral edema of lower extremity R60.0 A/P: 
 
Cellulitis -IVAB (clindamycin, vancomycin) /cefepime d/c'd  
-ID consulted-appreciate 
-afebrile, leukocytosis improved  
-monitor  
-US non-vasc of indurated areas to r/o abscess formation-ok -blood cx NGTD  
-will await discharge when WBCs wnl and erythema resolved Acute on chronic renal failure 
-BUN/Cr continue to slowly improve 
-trend BMP 
-monitor I/O Chronic b/l edema 
-monitor 
-will need diuresis with improvement of kidney function DM II 
-accuchecks  
-monitor BS  
-SSI 
 
HTN 
-monitor BP 
-home medications  
-will add hydralazine PRN Morbid obesity 
-educate diet/activity Chronic anemia 
-monitor h/h 
-transfuse hgb < 7 CHICO Smith, NP-C [address] [place] [address] [facility] [phone] Office:  [phone]

## 2018-10-10 NOTE — PROGRESS NOTES
Infectious Disease Follow-up Admit Date: 10/3/2018 Current abx Prior abx  
linezolid  10/10   0 10/3 Ceftriaxone x 1 zosyn 10/3-2, cefepime 10/5-3;Vanco/clinda 10/3-7 Assessment ->Rec:  
 
Sepsis poa- wbc 34k low grade fever joon d/t cellulitis ->wbc remains elevated, trending downward 
->  
-> Afebrile and stable - but NOT ready for dc- see below 
->monitor bctx's, ngtd LLE cellulitis- CT done in ER read as edema c/w cellulitis no gas abscess or OM  
-reports began 2 days PTA ? Insect bite with pus treated peroxide  
-tender L inguinal likely reactive lymphadenitis, faint tender erythema with indurated areas without drainage foot to above knee, no crepitance (+) DP, 3+ edema, R leg less edema (+) scarring  -> slowly improving 
->cont Vanc/clinda till erythema resolved and wbc wnl 
-- 
--> leg elevation 
--> observe  for resolution PIV exit site erythema- --> remove, replace in new site Chronic ble edema  ->diuresis, d/w pt to elevate b/l LE at heart level H/o Gp G beta strep BSI from RLE cellulitis 7/28, with sepsis joon then- seen by Dr. Yordan Espinoza, treated initially iv abx out 8/1 on 10 day course of keflex  ->monitor bctx's - NTD JOON on CKD3 Cr 4.7 ->4.2->4 ->2.21-> 1.4 now -- was 1.6 7/31  ->dose abx for reduced eGFR 
  
sarcoid DM2 diet controlled, A1C 5 ->BS control per IM Comorbid: HLD HTN AUB with ch anemia h/o genital hsv on acyclovir prophylaxis ->per IM Lab Results Component Value Date/Time Creatinine, POC 1.4 (H) 07/31/2012 08:58 AM  
 Creatinine 1.83 (H) 10/10/2018 08:18 AM  
 
 
 
MICROBIOLOGY:  
10/3 bctx x 2 ngtd LINES AND CATHETERS:  
piv Active Hospital Problems Diagnosis Date Noted  Bilateral edema of lower extremity 10/08/2018  Cellulitis 10/03/2018  Chronic anemia 07/28/2018  Acute on chronic renal failure ([facility] [address] 09/02/2013 Baseline Cr 1.3, Cr of 3.79 with eGFR of 14 on admission.  CKD (chronic kidney disease) stage 3, GFR 30-59 ml/min ([facility]) 09/02/2013 Baseline Cr ~1.3, eGFR ~47, Nephrologist Dr. Tanner Cisneros.  Morbid obesity ([facility] 09/02/2013 BMI 42 with hypertension and type 2 DM.  Type 2 diabetes mellitus ([facility] 09/02/2013  Essential hypertension 09/02/2013 Subjective: Interval notes reviewed. Pt says her leg  improving, does have some redness and warmth still with tenderness, but better than what she presented with, No fever, trying to keep legs elevated. No family at bedside. Current Facility-Administered Medications Medication Dose Route Frequency  [START ON 10/11/2018] vancomycin (VANCOCIN) 1500 mg in  ml infusion  1,500 mg IntraVENous Q36H  
 [START ON 10/13/2018] VANCOMYCIN INFORMATION NOTE   Other ONCE  potassium chloride 10 mEq in 100 ml IVPB  10 mEq IntraVENous Q1H  
 hydrALAZINE (APRESOLINE) 20 mg/mL injection 20 mg  20 mg IntraVENous Q6H PRN  
 amLODIPine (NORVASC) tablet 10 mg  10 mg Oral DAILY  sodium chloride (NS) flush 5-10 mL  5-10 mL IntraVENous PRN  
 labetalol (NORMODYNE) tablet 300 mg  300 mg Oral BID  simvastatin (ZOCOR) tablet 20 mg  20 mg Oral QHS  venlafaxine-SR (EFFEXOR-XR) capsule 150 mg  150 mg Oral DAILY WITH BREAKFAST  ferrous sulfate tablet 324 mg  324 mg Oral ACB  acyclovir (ZOVIRAX) tablet 400 mg  400 mg Oral DAILY  sodium chloride (NS) flush 5-10 mL  5-10 mL IntraVENous PRN  
 acetaminophen (TYLENOL) tablet 650 mg  650 mg Oral Q4H PRN  
 morphine injection 2 mg  2 mg IntraVENous Q3H PRN  
 heparin (porcine) injection 5,000 Units  5,000 Units SubCUTAneous Q8H  
 glucose chewable tablet 16 g  4 Tab Oral PRN  
 glucagon (GLUCAGEN) injection 1 mg  1 mg IntraMUSCular PRN  
 dextrose (D50) infusion 12.5-25 g  25-50 mL IntraVENous PRN  
 VANCOMYCIN INFORMATION NOTE   Other Rx Dosing/Monitoring  budesonide (PULMICORT) 500 mcg/2 ml nebulizer suspension  500 mcg Nebulization BID RT  
  arformoterol (BROVANA) neb solution 15 mcg  15 mcg Nebulization BID RT  
 
 
 
Objective:  
 
Visit Vitals  BP (!) 148/103 (BP 1 Location: Left arm, BP Patient Position: Pre-activity)  Pulse 84  Temp 98.6 °F (37 °C)  Resp 24  
 Ht 5' (1.524 m)  Wt 145.2 kg (320 lb)  SpO2 99%  Breastfeeding No  
 BMI 62.5 kg/m2 Temp (24hrs), Av.6 °F (37 °C), Min:98.4 °F (36.9 °C), Max:98.8 °F (37.1 °C) GEN: obese BF sitting on side of bed, NAD HEENT: anicteric no thrush NECK: supple trachea midline CHEST: no rales rhonchi or wheeze CVS: RRR no murmurs ABD: soft obese (+) BS non-tender EXT: erythema now localized to  raised circular large lesion left ant/lat calf--improved from yesterday- no drainage, 3+ edema no crepitance , warmth present, tenderness improved,but still significant 
 
lue pic with erythema at exit site . Labs: Results:  
Chemistry Recent Labs 10/10/18 
 0818  10/09/18 
 0522  10/08/18 
 5565 GLU  86  85  78 NA  142  140  141  
K  3.4*  3.7  3.3*  
CL  109*  106  107 CO2  25  23  26 BUN  23*  29*  32* CREA  1.83*  2.08*  2.21* CA  8.6  8.6  8.4* AGAP  8  11  8 BUCR  13  14  14 CBC w/Diff Recent Labs 10/10/18 
 0818  10/09/18 
 0522  10/08/18 
 4947 WBC  12.0  14.5*  13.9*  
RBC  3.49*  3.74*  3.49* HGB  7.0*  7.3*  7.1*  
HCT  22.3*  24.1*  22.4*  
PLT  277  326  238 GRANS   --    --   83* LYMPH   --    --   7* EOS   --    --   2 Microbiology Results All Micro Results Procedure Component Value Units Date/Time CULTURE, BLOOD [[id number]] Collected:  10/03/18 1223 Order Status:  Completed Specimen:  Blood from Blood Updated:  10/09/18 5900 Special Requests: PERIPHERAL Culture result: NO GROWTH 6 DAYS     
 CULTURE, BLOOD [[id number]] Collected:  10/03/18 1240 Order Status:  Completed Specimen:  Blood from Blood Updated:  10/09/18 3178   Special Requests: PERIPHERAL     
 Culture result: NO GROWTH 6 DAYS     
 CULTURE, Deboraha Bound STAIN [[id number]] Collected:  10/03/18 1615 Order Status:  Canceled Specimen:  Wound from Leg CULTURE, BLOOD [[id number]] Collected:  10/03/18 1230 Order Status:  Canceled Specimen:  Blood from Blood Regino Reynolds MD, [place] October 10, 2018 [facility] [phone]

## 2018-10-10 NOTE — PROGRESS NOTES
conducted a Follow up consultation and Spiritual Assessment for [facility] JOSE ACOSTA, who is a 46 y.o.,female. The  provided the following Interventions: 
Continued the relationship of care and support during visit in room 2221. Listened empathically as patient shared experience of hospitalization and feelings of progress. Offered prayer and assurance of continued prayer on patient's behalf. The following outcomes were achieved: 
Patient expressed gratitude for pastoral care visit. Assessment: 
There are no further spiritual or Muslim issues which require Spiritual Care Services interventions at this time. Plan: 
Chaplains will continue to follow and will provide pastoral care on an as needed/requested basis.  recommends bedside caregivers page  on duty if patient shows signs of acute spiritual or emotional distress. Roxana Schmidtlain [facility]tern [facility] Spiritual Care 
[phone]

## 2018-10-10 NOTE — PROGRESS NOTES
0732 spoke with Candice Soto NP. Pt iv is infiltrated, pt has iv K order, melissa waiting on ID to see if she can discharge, will wait on getting new iv until orders are received. 1200 no dc per ID, will attempt iv insertion 1210 PICC RN called for iv placement, this RN sees no suitable sites. 1211 pt now has discharge order, shannon called for confirmation, she said \"yes\" 1250 melissa paged for clarification on K order; can it be switched to oral? 
 
1600 Bedside and Verbal shift change report given to giancarlo (oncoming nurse) by Jordan Oleary RN 
 (offgoing nurse). Report included the following information Kardex and Intake/Output.

## 2018-10-10 NOTE — DISCHARGE INSTRUCTIONS
DISCHARGE SUMMARY from Nurse    PATIENT INSTRUCTIONS:    After general anesthesia or intravenous sedation, for 24 hours or while taking prescription Narcotics:  · Limit your activities  · Do not drive and operate hazardous machinery  · Do not make important personal or business decisions  · Do  not drink alcoholic beverages  · If you have not urinated within 8 hours after discharge, please contact your surgeon on call. Report the following to your surgeon:  · Excessive pain, swelling, redness or odor of or around the surgical area  · Temperature over 100.5  · Nausea and vomiting lasting longer than 4 hours or if unable to take medications  · Any signs of decreased circulation or nerve impairment to extremity: change in color, persistent  numbness, tingling, coldness or increase pain  · Any questions    What to do at Home:  Recommended activity: Activity as tolerated, elevate legs when sitting. If you experience any of the symptoms above, please follow up with your primary care physician. *  Please give a list of your current medications to your Primary Care Provider. *  Please update this list whenever your medications are discontinued, doses are      changed, or new medications (including over-the-counter products) are added. *  Please carry medication information at all times in case of emergency situations. These are general instructions for a healthy lifestyle:    No smoking/ No tobacco products/ Avoid exposure to second hand smoke  Surgeon General's Warning:  Quitting smoking now greatly reduces serious risk to your health.     Obesity, smoking, and sedentary lifestyle greatly increases your risk for illness    A healthy diet, regular physical exercise & weight monitoring are important for maintaining a healthy lifestyle    You may be retaining fluid if you have a history of heart failure or if you experience any of the following symptoms:  Weight gain of 3 pounds or more overnight or 5 pounds in a week, increased swelling in our hands or feet or shortness of breath while lying flat in bed. Please call your doctor as soon as you notice any of these symptoms; do not wait until your next office visit. Recognize signs and symptoms of STROKE:    F-face looks uneven    A-arms unable to move or move unevenly    S-speech slurred or non-existent    T-time-call 911 as soon as signs and symptoms begin-DO NOT go       Back to bed or wait to see if you get better-TIME IS BRAIN. Warning Signs of HEART ATTACK     Call 911 if you have these symptoms:   Chest discomfort. Most heart attacks involve discomfort in the center of the chest that lasts more than a few minutes, or that goes away and comes back. It can feel like uncomfortable pressure, squeezing, fullness, or pain.  Discomfort in other areas of the upper body. Symptoms can include pain or discomfort in one or both arms, the back, neck, jaw, or stomach.  Shortness of breath with or without chest discomfort.  Other signs may include breaking out in a cold sweat, nausea, or lightheadedness. Don't wait more than five minutes to call 911 - MINUTES MATTER! Fast action can save your life. Calling 911 is almost always the fastest way to get lifesaving treatment. Emergency Medical Services staff can begin treatment when they arrive -- up to an hour sooner than if someone gets to the hospital by car. The discharge information has been reviewed with the patient. The patient verbalized understanding. Discharge medications reviewed with the patient and appropriate educational materials and side effects teaching were provided. Patient armband removed and shredded.   ___________________________________________________________________________________________________________________________________

## 2018-10-10 NOTE — DISCHARGE SUMMARY
2 [facility]  Hospitalist Division    Discharge Summary    Patient: Gilberto Chiang MRN: [id number]  CSN: [id number]    YOB: 1970  Age: 46 y.o. Sex: female    DOA: 10/3/2018 LOS:  LOS: 7 days   Discharge Date:      Admission Diagnoses: Cellulitis    Discharge Diagnoses:    Problem List as of 10/10/2018  Date Reviewed: 7/31/2018          Codes Class Noted - Resolved    * (Principal)Cellulitis ICD-10-CM: L03.90  ICD-9-CM: 682.9  10/3/2018 - Present        Acute on chronic renal failure ([facility] ICD-10-CM: N17.9, N18.9  ICD-9-CM: 584.9, 585.9  9/2/2013 - Present    Overview Signed 9/2/2013 12:12 AM by Charito Rodriguez     Baseline Cr 1.3, Cr of 3.79 with eGFR of 14 on admission. Bilateral edema of lower extremity ICD-10-CM: R60.0  ICD-9-CM: 782.3  10/8/2018 - Present        Chronic anemia ICD-10-CM: D64.9  ICD-9-CM: 285.9  7/28/2018 - Present        Essential hypertension ICD-10-CM: I10  ICD-9-CM: 401.9  9/2/2013 - Present        Type 2 diabetes mellitus (HCC) ICD-10-CM: E11.9  ICD-9-CM: 250.00  9/2/2013 - Present        CKD (chronic kidney disease) stage 3, GFR 30-59 ml/min (HCC) ICD-10-CM: N18.3  ICD-9-CM: 585.3  9/2/2013 - Present    Overview Signed 9/2/2013 12:11 AM by Charito Rodriguez     Baseline Cr ~1.3, eGFR ~47, Nephrologist Dr. Zaheer Michel. Morbid obesity ([facility].) ICD-10-CM: E66.01  ICD-9-CM: 278.01  9/2/2013 - Present    Overview Signed 9/2/2013 12:17 AM by Charito Rodriguez     BMI 42 with hypertension and type 2 DM.              Bacteremia ICD-10-CM: R78.81  ICD-9-CM: 790.7  7/31/2018 - Present        Cellulitis of right leg ICD-10-CM: L03.115  ICD-9-CM: 682.6  7/28/2018 - Present        JOON (acute kidney injury) ([facility] ICD-10-CM: N17.9  ICD-9-CM: 584.9  7/28/2018 - Present        Chronic blood loss anemia ICD-10-CM: D50.0  ICD-9-CM: 280.0  9/2/2013 - Present    Overview Signed 9/2/2013 12:05 AM by Charito Rodriguez     Secondary to heavy menstrual losses with fibroids. Acute blood loss anemia ICD-10-CM: D62  ICD-9-CM: 285.1  9/2/2013 - Present    Overview Signed 9/2/2013 12:05 AM by Faith Alexis     Acute on chronic secondary to heavy menstrual losses with fibroids. Sarcoidosis ICD-10-CM: D86.9  ICD-9-CM: 790  9/2/2013 - Present        Fibroids ICD-10-CM: D21.9  ICD-9-CM: 215.9  9/2/2013 - Present    Overview Signed 9/2/2013 12:14 AM by Faith Alexis     Scheduled for surgery on Sept 16th, 2013. Depression ICD-10-CM: F32.9  ICD-9-CM: 437  9/2/2013 - Present              Discharge Condition: Good    Discharge To: Home    Consults: Infectious Disease    Hospital Course:      Tasha Dorantes is a 46 y.o. female with a PMHx of DM-II-diet controlled, HTN, CKD III, HLD, morbid obesity, who presented to the ED with 2 days of LLE pain and redness. She has a history of cellulitis on both extremities. She denied any fever or chills on this presentation nor any SOB or CP. She denies any recent sick contacts. She admits to area of broken skin on the lower part of her leg that opened up and drained two days ago. She decided to come in for further evaluation due to worsening pain. In the ED, she was found to have leukocytosis > 30 and JOON on CKD. CT imaging was done wo contrast that did not report concern for necrotizing fasciitis. ID consulted. IVAB (cefepime, vancomycin, clindamycin). Blood cultures negative. PVL LLE negative. Cefepime d/c'd 2/2 suspect SA/Strep as main pathogen. BUN/Cr continues to improve-will need diuresis at some point. ICS. Potassium replaced inpatient. The patient will be discharged home today with home health. The patient should follow-up with PCP in regards to recent hospitalization, including blood pressure medication management.  Patient to arrange.         Physical Exam:    General appearance: alert, cooperative   Lungs: clear to auscultation bilaterally  Heart: regular rate and rhythm, S1, S2 normal  Abdomen: soft, non tender, non distended. Normoactive bowel sounds. Extremities: extremities normal, atraumatic, b/l lower ext edema   Skin: erythematous LLE, warmth present, tenderness noted (improving)   Neurologic: Grossly normal  PSY: Mood and affect normal, appropriately behaved    Significant Diagnostic Studies:     Exam: CT left lower extremity without contrast      INDICATION: Cellulitis of left lower extremity. Osteomyelitis suspected.      COMPARISON: None      TECHNIQUE: Noncontrast helical volumetric scanning was performed from just above  the knee to just below the ankle. Axial, sagittal and coronal reconstructions  were created. One or more dose reduction techniques were used on this CT:  automated exposure control, adjustment of the mAs and/or kVp according to  patient size, and iterative reconstruction techniques.  The specific techniques  used on this CT exam have been documented in the patient's electronic medical  record.      FINDINGS:  Abundant subcutaneous fat is present. Diffuse subcutaneous fat stranding is  present extending to the muscle fascia, mildly to moderately in the visualized  distal lower upper leg, and proximal lower leg, becoming more prominent and  quite extensive in the distal lower leg at and distal to the level of the ankle,  particularly anteriorly and laterally. No organized fluid collections  appreciated. The overlying skin thickening is present. No significant  intermuscular low density within fat planes.  Musculature appears similar in  density throughout.      No erosions or periosteal reaction.      Subchondral cyst formation is present in the mid to lateral aspect of the  femoral trochlea with small adjacent marginal osteophytes on both sides of the  patellofemoral joint.      No soft tissue gas.      IMPRESSION  IMPRESSION:  Extensive subcutaneous edema, while nonspecific, is suggestive of the given  history of cellulitis.      No walled off fluid collection to suggest abscess. No soft tissue gas.      No evidence of osteomyelitis.     Chondromalacia of the femoral trochlea.           Examination: Ultrasound of the left lower extremity, limited.      HISTORY: Left leg pain. Cellulitis of the left lower extremity.      TECHNIQUE: Real-time grayscale and color Doppler sonographic images over the  region of clinical concern were performed by the technologist with  representative images saved to PACS.     COMPARISON: Correlation is made with CT scan of the left tibia and fibula  performed 10/3/2018.      FINDINGS:      Sonographic images obtained from the anterior and distal aspect of the left  lower extremity posterior extensive skin thickening and reticulation of  subcutaneous fat tissues. No discrete organized fluid collection is  demonstrated. Several superficial venous varicosities are demonstrated, similar  to prior CT examination.      IMPRESSION  IMPRESSION:  1. Considerable skin thickening and subcutaneous edema in keeping with  underlying cellulitis. No organized or drainable fluid collection present on the  provided images. Discharge Medications:             Current Discharge Medication List      START taking these medications    Details   amLODIPine (NORVASC) 10 mg tablet Take 1 Tab by mouth daily. Qty: 30 Tab, Refills: 0         CONTINUE these medications which have NOT CHANGED    Details   acetaminophen (TYLENOL) 325 mg tablet Take 2 Tabs by mouth every four (4) hours as needed for Pain. Qty: 20 Tab, Refills: 0      budesonide-formoterol (SYMBICORT) 160-4.5 mcg/actuation HFAA Take 2 Puffs by inhalation two (2) times a day. Qty: 1 Inhaler, Refills: 5      ferrous sulfate 324 mg (65 mg iron) tablet Take 324 mg by mouth Daily (before breakfast). venlafaxine-SR (EFFEXOR-XR) 150 mg capsule Take  by mouth daily. labetalol (NORMODYNE) 200 mg tablet Take 300 mg by mouth two (2) times a day. simvastatin (ZOCOR) 20 mg tablet Take 20 mg by mouth nightly. cyclobenzaprine (FLEXERIL) 5 mg tablet Take 1 Tab by mouth three (3) times daily as needed for Muscle Spasm(s). Qty: 12 Tab, Refills: 0      MULTIVITAMIN PO Take  by mouth. acyclovir (ZOVIRAX) 400 mg tablet Take 400 mg by mouth daily. Activity: Activity as tolerated, continue elevation of b/l lower extremities     Diet: Cardiac Diet    Wound Care: None needed    Follow-up:     The patient should follow-up with PCP in 1 week in regards to recent hospitalization. Patient to arrange. The patient will need to follow-up with PCP in regards to blood pressure medication dosing. Patient to arrange.       Discharge time > 35 mins   Kary Mcclendon NP  10/10/2018, 9:41 AM

## 2018-10-10 NOTE — PROGRESS NOTES
Problem: Mobility Impaired (Adult and Pediatric) Goal: *Acute Goals and Plan of Care (Insert Text) Physical Therapy Goals Initiated 10/5/2018 and to be accomplished within 7 day(s) 1. Patient will move from supine to sit and sit to supine in bed with modified independence. 2.  Patient will transfer from bed to chair and chair to bed with modified independence using the least restrictive device. 3.  Patient will perform sit to stand with modified independence. 4.  Patient will ambulate with modified independence for 100 feet with the least restrictive device. 5.  Patient will ascend/descend 6 stairs with handrail(s) with modified independence. Outcome: Progressing Towards Goal 
 
PHYSICAL THERAPY: Daily TREATMENT Note INPATIENT: Medicaid: Hospital Day: 8 Patient: Lorna Hood (67 y.o. female)    Date: 10/10/2018 Primary Diagnosis: Cellulitis  
 ,  ,  
Precautions: Fall, Skin WBAT Chart, physical therapy assessment, plan of care and goals were reviewed. PLOF:Independent ASSESSMENT: 
Pt continues to demonstrating improved activity tolerance, decreased time for bed mobility and transfers, pt reporting decreased pain during gait training, improved pacing during gait training. Progression toward goals: 
      Improving appropriately and progressing toward goals PLAN: 
Patient continues to benefit from skilled intervention to address the above impairments. Continue treatment per established plan of care. EDUCATION:  
Education:  Patient was educated on the following topics: walkers Barriers to Learning/Limitations: None Compensate with: visual, verbal, tactile, kinesthetic cues/model Discharge Recommendations:  Home Health Further Equipment Recommendations for Discharge:  wide/ bariatric rolling walker Factors which may impact discharge planning: none SUBJECTIVE:  
Patient stated I am surprised its feeing better today.  OBJECTIVE DATA SUMMARY:  
 Critical Behavior: 
Neurologic State: Alert Orientation Level: Oriented X4 Cognition: Appropriate for age attention/concentration, Follows commands G CODE:Mobility [id number] Current  CI= 1-19%   Goal  CI= 1-19%. The severity rating is based on the Other [facility] [address] Standing Balance Scale 
0: Pt performs 25% or less of standing activity (Max assist) CN, 100% impaired. 1: Pt supports self with upper extremities but requires therapist assistance. Pt performs 25-50% of effort (Mod assist) CM, 80% to <100% impaired. 1+: Pt supports self with upper extremities but requires therapist assistance. Pt performs >50% effort. (Min assist). CL, 60% to <80% impaired. 2: Pt supports self independently with both upper extremities (walker, crutches, parallel bars). CL, 60% to <80% impaired. 2+: Pt support self independently with 1 upper extremity (cane, crutch, 1 parallel bar). CK, 40% to <60% impaired. 3: Pt stands without upper extremity support for up to 30 seconds. CK, 40% to <60% impaired. 3+: Pt stands without upper extremity support for 30 seconds or greater. CJ, 20% to <40% impaired. 4: Pt independently moves and returns center of gravity 1-2 inches in one plane. CJ, 20% to <40% impaired. 4+: Pt independently moves and returns center of gravity 1-2 inches in multiple planes. CI, 1% to <20% impaired. 5: Pt independently moves and returns center of gravity in all planes greater than 2 inches. CH, 0% impaired. Functional Mobility: 
 
 
Functional Status Indep (I) Mod I Super-vision Min A Mod A Max A Total A Assist x2 Verbal cues Additional time Not tested Comments Rolling []  [x]  [] []    []    []  []  [] [] [] [] Supine to sit []  [x]  [] []  []  []  []  [] [] [] [] Sit to supine []  [x]  [] []  []  []  []  [] [] [] [] Sit to stand []  [x]  [] []  []  []  []  [] [] [] []   
Stand to sit []  [x]  [] []  []  []  []  [] [] [] [] Bed to chair transfers []  [x]  [] []  []  []  []  [] [] [] [] Balance Good Gerber Acosta Poor Unable Not tested Comments Sitting static [x]  []  []  []  [] Sitting dynamic [x]  []  []  []  []   
Standing static [x]  []  []  []  []   
Standing dynamic [x]  []  []  []  [] Mobility/Gait:  
Level of Assistance: Supervision Assistive Device: rolling walker Distance Ambulated: 70 feet Left Lower Extremity: WBAT Right Lower Extremity: WBAT Base of Support: [place] Speed/Evelyn: pace decreased (<100 feet/min) Step Length: left shortened and right shortened Swing Pattern: [facility]ps [facility] Stance: [place] Gait Abnormalities: decreased step clearance Stairs:  
Level of Assistance: Unable at this time Vital Signs Temp: 98.6 °F (37 °C) Pulse (Heart Rate): 84    
BP: (!) 148/103 Resp Rate: 24    
O2 Sat (%): 99 % Pain: 
Pre treatment pain level:6 Post treatment pain level:6 Pain Scale 1: Numeric (0 - 10) Pain Intensity 1: 6 Pain Location 1: Leg 
  
Pain Description 1: Aching Pain Intervention(s) 1: Medication (see MAR) Activity Tolerance:  
Good After treatment:  
Patient left in no apparent distress sitting up in chair Call bell left within reach Kumar Lugo PTA Time Calculation: 11 mins

## 2018-10-10 NOTE — PROGRESS NOTES
Offered the pt FOC for home care, she chose [address]. Pt verifed the contact information on the face sheet is correct. Referral sent to intake via [facility] and called to [facility] Delivered the pt a rolling walker form the [employer]. Faxed the referral and signed delivery ticket to [facility] for processing. . 
 
Care Management Interventions PCP Verified by CM: Yes 
Palliative Care Criteria Met (RRAT>21 & CHF Dx)?: No 
Mode of Transport at Discharge: Other (see comment) Transition of Care Consult (CM Consult): Home Health [address] Yes Discharge Durable Medical Equipment: No 
Physical Therapy Consult: No 
Occupational Therapy Consult: No 
Speech Therapy Consult: No 
Current Support Network: Relative's Home Confirm Follow Up Transport: Family Plan discussed with Pt/Family/Caregiver: Yes Freedom of Choice Offered: Yes Discharge Location Discharge Placement: Home with home health

## 2018-10-10 NOTE — PROGRESS NOTES
Pharmacy Dosing Services: Vancomycin Indication: Cellulitis Day of therapy: 8 Other Antimicrobials (Include dose, start day & day of therapy): 
Clindamycin 900 mg iv q8h Acyclovir 400 mg PO daily Loading dose (date given): 2000 mg 
Current Maintenance dose: 1500 mg IV every 48 hours Goal Vancomycin Level: 15 
(Trough 15-20 for most infections, 20 for meningitis/osteomyelitis, pre-HD level ~25) Vancomycin Level (if drawn):  
10/4 17.1 (25 hr post 1st dose only) 10 - 12.8  after 37.38 hours 10/10 - 9.7 ~ 46 hours post-dose Significant Cultures:  
Blood: negative. Pending. Renal function stable? (unstable defined as SCr increase of 0.5 mg/dL or > 50% increase from baseline, whichever is greater) (Y/N): N (improving) CAPD, Hemodialysis or Renal Replacement Therapy (Y/N): N Recent Labs 10/10/18 
 0818  10/09/18 
 0522  10/08/18 
 7164 CREA  1.83*  2.08*  2.21* BUN  23*  29*  32* WBC  12.0  14.5*  13.9* Temp (24hrs), Av.4 °F (36.9 °C), Min:97.6 °F (36.4 °C), Max:98.8 °F (37.1 °C) Creatinine Clearance (Creatinine Clearance (ml/min)): Estimated Creatinine Clearance: 49.6 mL/min (based on Cr of 1.83). Regimen assessment:  
- Renal function slowly improving - Level subtherapeutic, will increase dosing frequency Maintenance dose: 1500 mg IV every 36 hours Next scheduled level: 10/13 at 0230 Pharmacy will follow daily and adjust medications as appropriate for renal function and/or serum levels. Thank you, Alexa Lovelace, PHARMD

## 2018-10-12 ENCOUNTER — HOME CARE VISIT (OUTPATIENT)
Dept: HOME HEALTH SERVICES | Facility: HOME HEALTH | Age: 48
End: 2018-10-12

## 2018-10-22 RX ORDER — BUDESONIDE AND FORMOTEROL FUMARATE DIHYDRATE 160; 4.5 UG/1; UG/1
AEROSOL RESPIRATORY (INHALATION)
Qty: 10.2 INHALER | Refills: 5 | Status: SHIPPED | OUTPATIENT
Start: 2018-10-22

## 2018-10-23 ENCOUNTER — HOSPITAL ENCOUNTER (EMERGENCY)
Age: 48
Discharge: HOME | End: 2018-10-23
Attending: EMERGENCY MEDICINE
Payer: MEDICAID

## 2018-10-23 VITALS
SYSTOLIC BLOOD PRESSURE: 141 MMHG | OXYGEN SATURATION: 98 % | TEMPERATURE: 99.3 F | RESPIRATION RATE: 16 BRPM | WEIGHT: 293 LBS | HEART RATE: 71 BPM | HEIGHT: 66 IN | BODY MASS INDEX: 47.09 KG/M2 | DIASTOLIC BLOOD PRESSURE: 87 MMHG

## 2018-10-23 DIAGNOSIS — I89.0 CHRONIC ACQUIRED LYMPHEDEMA: Primary | ICD-10-CM

## 2018-10-23 LAB
ANION GAP SERPL CALC-SCNC: 6 MMOL/L (ref 3–18)
BASOPHILS # BLD: 0 K/UL (ref 0–0.1)
BASOPHILS NFR BLD: 0 % (ref 0–2)
BUN SERPL-MCNC: 13 MG/DL (ref 7–18)
BUN/CREAT SERPL: 7 (ref 12–20)
CALCIUM SERPL-MCNC: 8.4 MG/DL (ref 8.5–10.1)
CHLORIDE SERPL-SCNC: 103 MMOL/L (ref 100–108)
CO2 SERPL-SCNC: 33 MMOL/L (ref 21–32)
CREAT SERPL-MCNC: 1.96 MG/DL (ref 0.6–1.3)
DIFFERENTIAL METHOD BLD: ABNORMAL
EOSINOPHIL # BLD: 0.3 K/UL (ref 0–0.4)
EOSINOPHIL NFR BLD: 5 % (ref 0–5)
ERYTHROCYTE [DISTWIDTH] IN BLOOD BY AUTOMATED COUNT: 17.9 % (ref 11.6–14.5)
GLUCOSE SERPL-MCNC: 99 MG/DL (ref 74–99)
HCT VFR BLD AUTO: 24.4 % (ref 35–45)
HGB BLD-MCNC: 7.2 G/DL (ref 12–16)
LYMPHOCYTES # BLD: 0.9 K/UL (ref 0.9–3.6)
LYMPHOCYTES NFR BLD: 17 % (ref 21–52)
MCH RBC QN AUTO: 19.6 PG (ref 24–34)
MCHC RBC AUTO-ENTMCNC: 29.5 G/DL (ref 31–37)
MCV RBC AUTO: 66.3 FL (ref 74–97)
MONOCYTES # BLD: 0.6 K/UL (ref 0.05–1.2)
MONOCYTES NFR BLD: 11 % (ref 3–10)
NEUTS SEG # BLD: 3.5 K/UL (ref 1.8–8)
NEUTS SEG NFR BLD: 67 % (ref 40–73)
PLATELET # BLD AUTO: 300 K/UL (ref 135–420)
POTASSIUM SERPL-SCNC: 3.4 MMOL/L (ref 3.5–5.5)
RBC # BLD AUTO: 3.68 M/UL (ref 4.2–5.3)
SODIUM SERPL-SCNC: 142 MMOL/L (ref 136–145)
WBC # BLD AUTO: 5.3 K/UL (ref 4.6–13.2)

## 2018-10-23 PROCEDURE — 80048 BASIC METABOLIC PNL TOTAL CA: CPT | Performed by: NURSE PRACTITIONER

## 2018-10-23 PROCEDURE — 85025 COMPLETE CBC W/AUTO DIFF WBC: CPT | Performed by: NURSE PRACTITIONER

## 2018-10-23 PROCEDURE — 99282 EMERGENCY DEPT VISIT SF MDM: CPT

## 2018-10-23 RX ORDER — ACETAMINOPHEN 325 MG/1
650 TABLET ORAL
Qty: 100 TAB | Refills: 0 | Status: SHIPPED | OUTPATIENT
Start: 2018-10-23 | End: 2019-06-02

## 2018-10-23 NOTE — ED NOTES
I performed a brief evaluation, including history and physical, of the patient here in triage and I have determined that pt will need further treatment and evaluation from the main side ER physician. I have placed initial orders to help in expediting patients care. October 23, 2018 at 3:49 PM - Kellee Fan NP There were no vitals taken for this visit.

## 2018-10-23 NOTE — ED PROVIDER NOTES
EMERGENCY DEPARTMENT HISTORY AND PHYSICAL EXAM 
 
4:45 PM 
 
 
Date: 10/23/2018 Patient Name: Yonas Bergman History of Presenting Illness Chief Complaint Patient presents with  
 Skin Infection History Provided By: Patient Additional History (Context): Yonas Bergman is a 46 y.o. female with past medical Hx of cellulitis who presents with an acute need for a wound check on bilateral legs with an onset of today. Patient says that she has had draining blisters and that she is concerned about infection. PCP: Sam Canchola NP Current Outpatient Medications Medication Sig Dispense Refill  acetaminophen (TYLENOL) 325 mg tablet Take 2 Tabs by mouth every four (4) hours as needed for Pain. 100 Tab 0  
 SYMBICORT 160-4.5 mcg/actuation HFAA INHALE 2 PUFFS BY MOUTH TWICE A DAY 10.2 Inhaler 5  
 amLODIPine (NORVASC) 10 mg tablet Take 1 Tab by mouth daily. 30 Tab 0  
 clindamycin (CLEOCIN) 300 mg capsule Take 1 Cap by mouth three (3) times daily. 21 Cap 0  
 acetaminophen (TYLENOL) 325 mg tablet Take 2 Tabs by mouth every four (4) hours as needed for Pain. 20 Tab 0  cyclobenzaprine (FLEXERIL) 5 mg tablet Take 1 Tab by mouth three (3) times daily as needed for Muscle Spasm(s). 12 Tab 0  MULTIVITAMIN PO Take  by mouth.  acyclovir (ZOVIRAX) 400 mg tablet Take 400 mg by mouth daily.  ferrous sulfate 324 mg (65 mg iron) tablet Take 324 mg by mouth Daily (before breakfast).  venlafaxine-SR (EFFEXOR-XR) 150 mg capsule Take  by mouth daily.  labetalol (NORMODYNE) 200 mg tablet Take 300 mg by mouth two (2) times a day.  simvastatin (ZOCOR) 20 mg tablet Take 20 mg by mouth nightly. Past History Past Medical History: 
Past Medical History:  
Diagnosis Date  Anemia  Bone lesion  Chronic kidney disease  Depressed  Diabetes ([address]  Diabetes mellitus ([facility] [address]  Hypertension  Kidney problem  Pneumonia  Sarcoidosis  Thyroid disorder  Uterus problem Past Surgical History: 
Past Surgical History:  
Procedure Laterality Date  HX  SECTION    
 HX OTHER SURGICAL    
 neck biopsy  HX TUBAL LIGATION Family History: 
Family History Problem Relation Age of Onset  Hypertension Mother  Diabetes Mother  Cancer Mother  Cancer Sister Social History: 
Social History Tobacco Use  Smoking status: Former Smoker Types: Cigarettes  Smokeless tobacco: Former User Substance Use Topics  Alcohol use: Yes Comment: beer rarely  Drug use: No  
 
 
Allergies: 
No Known Allergies Review of Systems Review of Systems Constitutional: Negative for fever. Skin:  
     Positive for blisters on left leg Positive for wound check All other systems reviewed and are negative. Physical Exam  
 
Visit Vitals BP (!) 153/98 (BP 1 Location: Left arm, BP Patient Position: At rest;Sitting) Pulse 72 Temp 99.3 °F (37.4 °C) Resp 16 Ht 5' 6\" (1.676 m) Wt 145.2 kg (320 lb) SpO2 96% BMI 51.65 kg/m² Physical Exam  
Constitutional: She is oriented to person, place, and time. She appears well-developed and well-nourished. No distress. HENT:  
Head: Normocephalic and atraumatic. Mouth/Throat: Mucous membranes are normal.  
Eyes: Pupils are equal, round, and reactive to light. Neck: Normal range of motion. Neck supple. Cardiovascular: Normal rate, regular rhythm, normal heart sounds and intact distal pulses. Exam reveals no gallop and no friction rub. No murmur heard. 2+ radial pulses bilaterally. No BLE edema. Pulmonary/Chest: Effort normal and breath sounds normal. No respiratory distress. She has no wheezes. She has no rales. Abdominal: Soft. Bowel sounds are normal. She exhibits no distension. There is no tenderness. Musculoskeletal:  
Slight induration No warmth to touch Peeling blisters Chronic draining ulcer on posterior to distal aspect of LE Lymphadenopathy:  
No lymphadenopathy. Neurological: She is alert and oriented to person, place, and time. No cranial nerve deficit. Skin: Skin is warm and dry. Nursing note and vitals reviewed. Diagnostic Study Results Labs - Recent Results (from the past 12 hour(s)) CBC WITH AUTOMATED DIFF Collection Time: 10/23/18  4:05 PM  
Result Value Ref Range WBC 5.3 4.6 - 13.2 K/uL  
 RBC 3.68 (L) 4.20 - 5.30 M/uL HGB 7.2 (L) 12.0 - 16.0 g/dL HCT 24.4 (L) 35.0 - 45.0 % MCV 66.3 (L) 74.0 - 97.0 FL  
 MCH 19.6 (L) 24.0 - 34.0 PG  
 MCHC 29.5 (L) 31.0 - 37.0 g/dL  
 RDW 17.9 (H) 11.6 - 14.5 % PLATELET 717 783 - 159 K/uL NEUTROPHILS 67 40 - 73 % LYMPHOCYTES 17 (L) 21 - 52 % MONOCYTES 11 (H) 3 - 10 % EOSINOPHILS 5 0 - 5 % BASOPHILS 0 0 - 2 %  
 ABS. NEUTROPHILS 3.5 1.8 - 8.0 K/UL  
 ABS. LYMPHOCYTES 0.9 0.9 - 3.6 K/UL  
 ABS. MONOCYTES 0.6 0.05 - 1.2 K/UL  
 ABS. EOSINOPHILS 0.3 0.0 - 0.4 K/UL  
 ABS. BASOPHILS 0.0 0.0 - 0.1 K/UL  
 DF AUTOMATED METABOLIC PANEL, BASIC Collection Time: 10/23/18  4:05 PM  
Result Value Ref Range Sodium 142 136 - 145 mmol/L Potassium 3.4 (L) 3.5 - 5.5 mmol/L Chloride 103 100 - 108 mmol/L  
 CO2 33 (H) 21 - 32 mmol/L Anion gap 6 3.0 - 18 mmol/L Glucose 99 74 - 99 mg/dL BUN 13 7.0 - 18 MG/DL Creatinine 1.96 (H) 0.6 - 1.3 MG/DL  
 BUN/Creatinine ratio 7 (L) 12 - 20 GFR est AA 33 (L) >60 ml/min/1.73m2 GFR est non-AA 27 (L) >60 ml/min/1.73m2 Calcium 8.4 (L) 8.5 - 10.1 MG/DL Medical Decision Making I am the first provider for this patient. I reviewed the vital signs, available nursing notes, past medical history, past surgical history, family history and social history. Vital Signs-Reviewed the patient's vital signs. Records Reviewed: Nursing Notes Provider Notes (Medical Decision Making): MDM 
 Number of Diagnoses or Management Options Chronic acquired lymphedema:  
Diagnosis management comments: DDx: cellulitis chronic, lymphedema, or DVT Patient presenting for wound check. Recent dx of cellulitis with JOON and leukocytosis to 30. No fever, denies pain. Improving in all ways according to pt. Will check CBC and BMP, for WBC counts and renal function. 4:46 PM 
CBC shows stable anemia. No leukocytosis. Renal function is unchanged from pior. Will send home with tylenol script. Instructions to f/u with us or PCP in 1 week. Safe for d/c, careful return precautions given. Pt/family comfortable with plan Clarita Lynne MD 
 
 
 
 
 
Diagnosis Clinical Impression: 1. Chronic acquired lymphedema Disposition: Discharge Follow-up Information Follow up With Specialties Details Why Contact [facility] EMERGENCY DEPT Emergency Medicine  As needed, If symptoms worsen [address] 
[phone] Jessica Hagan, RAMU Nurse Practitioner In 1 week  [address] [id number] [phone] Medication List  
  
CHANGE how you take these medications * acetaminophen 325 mg tablet Commonly known as:  TYLENOL Take 2 Tabs by mouth every four (4) hours as needed for Pain. What changed:  Another medication with the same name was added. Make sure you understand how and when to take each. * acetaminophen 325 mg tablet Commonly known as:  TYLENOL Take 2 Tabs by mouth every four (4) hours as needed for Pain. What changed: You were already taking a medication with the same name, and this prescription was added. Make sure you understand how and when to take each. * This list has 2 medication(s) that are the same as other medications prescribed for you. Read the directions carefully, and ask your doctor or other care provider to review them with you. CONTINUE taking these medications   
acyclovir 400 mg tablet Commonly known as:  ZOVIRAX 
  
amLODIPine 10 mg tablet Commonly known as:  Herschell Saras Take 1 Tab by mouth daily. clindamycin 300 mg capsule Commonly known as:  CLEOCIN Take 1 Cap by mouth three (3) times daily. cyclobenzaprine 5 mg tablet Commonly known as:  FLEXERIL Take 1 Tab by mouth three (3) times daily as needed for Muscle Spasm(s). ferrous sulfate 324 mg (65 mg iron) tablet 
  
labetalol 200 mg tablet Commonly known as:  NORMODYNE 
  
MULTIVITAMIN PO 
  
simvastatin 20 mg tablet Commonly known as:  ZOCOR 
  
SYMBICORT 160-4.5 mcg/actuation Hfaa Generic drug:  budesonide-formoterol INHALE 2 PUFFS BY MOUTH TWICE A DAY 
  
venlafaxine- mg capsule Commonly known as:  EFFEXOR-XR Where to Get Your Medications Information about where to get these medications is not yet available Ask your nurse or doctor about these medications · acetaminophen 325 mg tablet 
  
 
_______________________________ Attestations: 
Scribe Attestation Louis Foy acting as a scribe for and in the presence of Getachew Pack MD     
October 23, 2018 at 4:45 PM 
    
Provider Attestation:     
I personally performed the services described in the documentation, reviewed the documentation, as recorded by the scribe in my presence, and it accurately and completely records my words and actions. October 23, 2018 at 4:45 PM - Getachew Pack MD   
_______________________________

## 2018-10-23 NOTE — DISCHARGE INSTRUCTIONS
Lymphedema: Care Instructions  Your Care Instructions    Lymphedema is fluid that builds up in the arms or legs. It is often caused by surgery to remove lymph nodes during cancer treatment, especially breast cancer surgery, which can cause fluid to build up in the arm. It can happen after radiation treatment to an area that involves lymph nodes. It also can be caused by a fractured bone or surgery to fix a fracture. And some medicines also can cause lymphedema. Some people get it for unknown reasons. Normally, lymph nodes trap bacteria and other substances as fluid flows through them. Then, the white cells in the body's defense, or immune, system can destroy the substances. But if there are few or no lymph nodes--or if the lymph system in an arm or leg has been damaged--fluid can build up in the affected arm or leg. You can take simple steps at home to help treat or prevent fluid buildup. Treatment may include raising the arm or leg to let gravity drain the fluid. You also can wear compression stockings or sleeves. Follow-up care is a key part of your treatment and safety. Be sure to make and go to all appointments, and call your doctor if you are having problems. It's also a good idea to know your test results and keep a list of the medicines you take. How can you care for yourself at home? · Wear a compression stocking or sleeve as your doctor suggests. It can help keep fluid from pooling in an arm or leg. Wear it during air travel. · Prop up the swollen arm or leg on a pillow anytime you sit or lie down. Try to keep it above the level of your heart. This will help reduce swelling. · Avoid crossing your legs if your legs are swollen. · Get some exercise on most days of the week. Increase the intensity of exercise slowly. Water aerobics can help reduce swelling by helping fluid move around. Wear your compression stocking or sleeve during exercise, but not during water exercise.   · See a physical therapist. He or she can teach you how to do self-massage to help fluid move around. You also can learn what activities would be best for you. · Keep your feet clean and wear clean socks or stockings every day. Check your feet often for signs of infection, such as redness or heat. Do not walk barefoot. · If you have had lymph nodes removed from under your arm:  ? Do not have blood drawn from the arm on the side of the lymph node surgery. ? Do not allow a blood pressure cuff to be placed on that arm. If you are in the hospital, make sure your nurse and other hospital staff know of your condition. ? Wear gloves when gardening or doing other activities that may lead to cuts on your fingers or hands. · If you have had lymph nodes removed from your groin:  ? Bathe your feet daily in lukewarm, not hot, water. Use a mild soap that has a moisturizer, or use a moisturizer separately. ? Check your feet for blisters or cuts. ? Wear comfortable and supportive shoes that fit properly. ? Wear the correct size of panty hose and stockings. Avoid garters or knee-high or thigh-high stockings. · Ask your doctor how to treat any cuts, scratches, insect bites, or other injuries that may occur. · Use sunscreen and insect repellent when outdoors to protect your skin from sunburn and insect bites. · Wear medical alert jewelry that says you have lymphedema. You can buy these at most drugstores and on the Internet. When should you call for help? Call your doctor now or seek immediate medical care if:    · You have signs of infection, such as:  ? Increased pain, swelling, warmth, or redness. ? Red streaks leading from the area. ? Pus draining from the area. ? A fever.    Watch closely for changes in your health, and be sure to contact your doctor if:    · You have new or worse symptoms from lymphedema.     · You do not get better as expected. Where can you learn more?   Go to http://marty-chente.info/. Enter V398 in the search box to learn more about \"Lymphedema: Care Instructions. \"  Current as of: March 28, 2018  Content Version: 11.8  © 4924-2110 [employer], [employer] us. Care instructions adapted under license by [employer] (?????) (which disclaims liability or warranty for this information). If you have questions about a medical condition or this instruction, always ask your healthcare professional. [place] [address] any warranty or liability for your use of this information.

## 2018-10-23 NOTE — ED TRIAGE NOTES
Left lower leg cellulitis. admited in beginning of month for same. Blisters and concerned for infection. , red and warm

## 2018-11-24 ENCOUNTER — HOSPITAL ENCOUNTER (EMERGENCY)
Age: 48
Discharge: HOME | End: 2018-11-24
Attending: EMERGENCY MEDICINE
Payer: MEDICAID

## 2018-11-24 VITALS
RESPIRATION RATE: 17 BRPM | HEART RATE: 80 BPM | DIASTOLIC BLOOD PRESSURE: 102 MMHG | BODY MASS INDEX: 48.82 KG/M2 | HEIGHT: 65 IN | WEIGHT: 293 LBS | TEMPERATURE: 98.9 F | SYSTOLIC BLOOD PRESSURE: 171 MMHG | OXYGEN SATURATION: 100 %

## 2018-11-24 DIAGNOSIS — I89.0 LYMPHEDEMA: Primary | ICD-10-CM

## 2018-11-24 LAB
APPEARANCE UR: CLEAR
BACTERIA URNS QL MICRO: NEGATIVE /HPF
BASOPHILS # BLD: 0 K/UL (ref 0–0.1)
BASOPHILS NFR BLD: 0 % (ref 0–2)
BILIRUB UR QL: NEGATIVE
COLOR UR: YELLOW
DIFFERENTIAL METHOD BLD: ABNORMAL
EOSINOPHIL # BLD: 0.2 K/UL (ref 0–0.4)
EOSINOPHIL NFR BLD: 5 % (ref 0–5)
EPITH CASTS URNS QL MICRO: NORMAL /LPF (ref 0–5)
ERYTHROCYTE [DISTWIDTH] IN BLOOD BY AUTOMATED COUNT: 20.1 % (ref 11.6–14.5)
GLUCOSE UR STRIP.AUTO-MCNC: NEGATIVE MG/DL
HCT VFR BLD AUTO: 27 % (ref 35–45)
HGB BLD-MCNC: 7.8 G/DL (ref 12–16)
HGB UR QL STRIP: NEGATIVE
KETONES UR QL STRIP.AUTO: NEGATIVE MG/DL
LEUKOCYTE ESTERASE UR QL STRIP.AUTO: NEGATIVE
LYMPHOCYTES # BLD: 1 K/UL (ref 0.9–3.6)
LYMPHOCYTES NFR BLD: 22 % (ref 21–52)
MCH RBC QN AUTO: 19.2 PG (ref 24–34)
MCHC RBC AUTO-ENTMCNC: 28.9 G/DL (ref 31–37)
MCV RBC AUTO: 66.5 FL (ref 74–97)
MONOCYTES # BLD: 0.4 K/UL (ref 0.05–1.2)
MONOCYTES NFR BLD: 9 % (ref 3–10)
NEUTS SEG # BLD: 2.9 K/UL (ref 1.8–8)
NEUTS SEG NFR BLD: 64 % (ref 40–73)
NITRITE UR QL STRIP.AUTO: NEGATIVE
PH UR STRIP: 5.5 [PH] (ref 5–8)
PLATELET # BLD AUTO: 255 K/UL (ref 135–420)
PMV BLD AUTO: 10.1 FL (ref 9.2–11.8)
PROT UR STRIP-MCNC: 100 MG/DL
RBC # BLD AUTO: 4.06 M/UL (ref 4.2–5.3)
RBC #/AREA URNS HPF: NEGATIVE /HPF (ref 0–5)
SP GR UR REFRACTOMETRY: 1.01 (ref 1–1.03)
UROBILINOGEN UR QL STRIP.AUTO: 0.2 EU/DL (ref 0.2–1)
WBC # BLD AUTO: 4.5 K/UL (ref 4.6–13.2)
WBC URNS QL MICRO: NORMAL /HPF (ref 0–4)

## 2018-11-24 PROCEDURE — 81001 URINALYSIS AUTO W/SCOPE: CPT

## 2018-11-24 PROCEDURE — 85025 COMPLETE CBC W/AUTO DIFF WBC: CPT

## 2018-11-24 PROCEDURE — 99283 EMERGENCY DEPT VISIT LOW MDM: CPT

## 2018-11-24 NOTE — ED PROVIDER NOTES
EMERGENCY DEPARTMENT HISTORY AND PHYSICAL EXAM 
 
1:43 PM 
 
 
Date: 11/24/2018 Patient Name: Declan Robles History of Presenting Illness Chief Complaint Patient presents with  Flank Pain  Leg Pain History Provided By: Patient Additional History (Context): 1:44 PM Declan Robles is a 46 y.o. female with h/o CKD, DM, HTN, who presents to ED complaining of worsening waxing and waning bilateral flank pain with onset 2 days ago. She also reports some leg pain. She says this flank pain has happened in the past, but she is unsure if it was a UTI. Denies dysuria, change in frequency, hematuria, SOB, CP, fevers, n/v/d. As for her leg pain, she reports that they are \"leaking\" and painful. She is not on blood thinners. NKDA. Denies smoking, admits to occasional etoh use. No recent surgeries. No modifying or aggravating factors were reported. No other concerns or symptoms at this time. PCP: Rivera Dean NP Current Outpatient Medications Medication Sig Dispense Refill  [START ON 11/26/2018] Comp. Stocking,Knee,Regular,Lrg misc 2 Each by Does Not Apply route two (2) times a week for 30 days. 16 Each 0  
 acetaminophen (TYLENOL) 325 mg tablet Take 2 Tabs by mouth every four (4) hours as needed for Pain. 100 Tab 0  
 SYMBICORT 160-4.5 mcg/actuation HFAA INHALE 2 PUFFS BY MOUTH TWICE A DAY 10.2 Inhaler 5  
 amLODIPine (NORVASC) 10 mg tablet Take 1 Tab by mouth daily. 30 Tab 0  
 clindamycin (CLEOCIN) 300 mg capsule Take 1 Cap by mouth three (3) times daily. 21 Cap 0  
 acetaminophen (TYLENOL) 325 mg tablet Take 2 Tabs by mouth every four (4) hours as needed for Pain. 20 Tab 0  cyclobenzaprine (FLEXERIL) 5 mg tablet Take 1 Tab by mouth three (3) times daily as needed for Muscle Spasm(s). 12 Tab 0  MULTIVITAMIN PO Take  by mouth.  acyclovir (ZOVIRAX) 400 mg tablet Take 400 mg by mouth daily.  ferrous sulfate 324 mg (65 mg iron) tablet Take 324 mg by mouth Daily (before breakfast).  venlafaxine-SR (EFFEXOR-XR) 150 mg capsule Take  by mouth daily.  labetalol (NORMODYNE) 200 mg tablet Take 300 mg by mouth two (2) times a day.  simvastatin (ZOCOR) 20 mg tablet Take 20 mg by mouth nightly. Past History Past Medical History: 
Past Medical History:  
Diagnosis Date  Anemia  Bone lesion  Chronic kidney disease  Depressed  Diabetes ([address]  Diabetes mellitus ([address]  Hypertension  Kidney problem  Pneumonia  Sarcoidosis  Thyroid disorder  Uterus problem Past Surgical History: 
Past Surgical History:  
Procedure Laterality Date  HX  SECTION    
 HX OTHER SURGICAL    
 neck biopsy  HX TUBAL LIGATION Family History: 
Family History Problem Relation Age of Onset  Hypertension Mother  Diabetes Mother  Cancer Mother  Cancer Sister Social History: 
Social History Tobacco Use  Smoking status: Former Smoker Types: Cigarettes  Smokeless tobacco: Former User Substance Use Topics  Alcohol use: Yes Comment: beer rarely  Drug use: No  
 
 
Allergies: 
No Known Allergies Review of Systems Review of Systems Constitutional: Negative for fever. Genitourinary: Positive for flank pain. Musculoskeletal:  
     + leg pain bilaterally All other systems reviewed and are negative. Physical Exam  
 
Visit Vitals BP (!) 171/102 (BP 1 Location: Right arm, BP Patient Position: At rest) Pulse 80 Temp 98.9 °F (37.2 °C) Resp 17 Ht 5' 5\" (1.651 m) Wt 145.2 kg (320 lb) SpO2 100% BMI 53.25 kg/m² Physical Exam  
Constitutional: She is oriented to person, place, and time. She appears well-developed and well-nourished. HENT:  
Head: Normocephalic and atraumatic. Eyes: EOM are normal. Pupils are equal, round, and reactive to light. Right eye exhibits no discharge. Left eye exhibits no discharge. Neck: Normal range of motion. Neck supple. No JVD present. No tracheal deviation present. Cardiovascular: Normal rate, regular rhythm and normal heart sounds. No murmur heard. Pulmonary/Chest: Effort normal and breath sounds normal. No respiratory distress. She has no wheezes. She has no rhonchi. She has no rales. Clear lungs, no crackles. Abdominal: Soft. Bowel sounds are normal. She exhibits no distension. There is no tenderness. There is no rebound and no CVA tenderness. Musculoskeletal: Normal range of motion. She exhibits edema (nonpitting edema bilateral lower extremities). She exhibits no tenderness or deformity. No tenderness over the spine Neurological: She is alert and oriented to person, place, and time. No cranial nerve deficit. 5/5 strength UE/LE, 5/5 sensation UE/LE Skin: Skin is warm and dry. No rash noted. No erythema. Sore on posterior calf draining serous fluid. No warmth, no overlying erythema. Psychiatric: She has a normal mood and affect. Her behavior is normal.  
 
Diagnostic Study Results Labs - Recent Results (from the past 12 hour(s)) CBC WITH AUTOMATED DIFF Collection Time: 11/24/18  1:52 PM  
Result Value Ref Range WBC 4.5 (L) 4.6 - 13.2 K/uL  
 RBC 4.06 (L) 4.20 - 5.30 M/uL HGB 7.8 (L) 12.0 - 16.0 g/dL HCT 27.0 (L) 35.0 - 45.0 % MCV 66.5 (L) 74.0 - 97.0 FL  
 MCH 19.2 (L) 24.0 - 34.0 PG  
 MCHC 28.9 (L) 31.0 - 37.0 g/dL RDW 20.1 (H) 11.6 - 14.5 % PLATELET 794 662 - 217 K/uL MPV 10.1 9.2 - 11.8 FL  
 NEUTROPHILS 64 40 - 73 % LYMPHOCYTES 22 21 - 52 % MONOCYTES 9 3 - 10 % EOSINOPHILS 5 0 - 5 % BASOPHILS 0 0 - 2 %  
 ABS. NEUTROPHILS 2.9 1.8 - 8.0 K/UL  
 ABS. LYMPHOCYTES 1.0 0.9 - 3.6 K/UL  
 ABS. MONOCYTES 0.4 0.05 - 1.2 K/UL  
 ABS. EOSINOPHILS 0.2 0.0 - 0.4 K/UL  
 ABS. BASOPHILS 0.0 0.0 - 0.1 K/UL  
 DF AUTOMATED URINALYSIS W/ RFLX MICROSCOPIC Collection Time: 11/24/18  1:57 PM  
Result Value Ref Range Color YELLOW Appearance CLEAR Specific gravity 1.015 1.005 - 1.030    
 pH (UA) 5.5 5.0 - 8.0 Protein 100 (A) NEG mg/dL Glucose NEGATIVE  NEG mg/dL Ketone NEGATIVE  NEG mg/dL Bilirubin NEGATIVE  NEG Blood NEGATIVE  NEG Urobilinogen 0.2 0.2 - 1.0 EU/dL Nitrites NEGATIVE  NEG Leukocyte Esterase NEGATIVE  NEG    
URINE MICROSCOPIC ONLY Collection Time: 11/24/18  1:57 PM  
Result Value Ref Range WBC 0 to 3 0 - 4 /hpf  
 RBC NEGATIVE  0 - 5 /hpf Epithelial cells 2+ 0 - 5 /lpf Bacteria NEGATIVE  NEG /hpf Radiologic Studies - No orders to display Medical Decision Making I am the first provider for this patient. I reviewed the vital signs, available nursing notes, past medical history, past surgical history, family history and social history. Vital Signs-Reviewed the patient's vital signs. Records Reviewed: Nursing Notes Provider Notes (Medical Decision Making): MDM Number of Diagnoses or Management Options Lymphedema:  
Diagnosis management comments: Diff dx: lymphedema, cellulitis, uti, pyelo Pt having L flank ttp, non reproducible, no cp or sob, do not suspect pe, clear lungs do not suspect pna, will check ua. Has bilat LE discomfort and swelling with serous clear drainage, appears c/w lymphedema which she has had in past, low suspcion for cellulitis with no warmth, no purulent drainage. Will check cbc for leukocytosis, has had in cellulitis in past, no need for bmp as pt has not any issues urinating (does have hx benjamin) 2:54 PM 
Cbc wnl, ua wnl, safe for dc, will rx compression stockings Safe for d/c, careful return precautions given. Pt/family comfortable with plan Martín Nance MD 
 
 
 
Diagnosis Clinical Impression: 1. Lymphedema Disposition: discharged Follow-up Information Follow up With Specialties Details Why Contact Nguyen Amezcua NP Nurse Practitioner In 3 days  [address] [id number] 
[phone] [facility] EMERGENCY DEPT Emergency Medicine  As needed, If symptoms worsen [address] 
[phone] Medication List  
  
START taking these medications Comp. Stocking,Knee,Regular,Lrg Misc 
2 Each by Does Not Apply route two (2) times a week for 30 days. Start taking on:  11/26/2018 CONTINUE taking these medications * acetaminophen 325 mg tablet Commonly known as:  TYLENOL Take 2 Tabs by mouth every four (4) hours as needed for Pain. * acetaminophen 325 mg tablet Commonly known as:  TYLENOL Take 2 Tabs by mouth every four (4) hours as needed for Pain. acyclovir 400 mg tablet Commonly known as:  ZOVIRAX 
  
amLODIPine 10 mg tablet Commonly known as:  [place] Take 1 Tab by mouth daily. clindamycin 300 mg capsule Commonly known as:  CLEOCIN Take 1 Cap by mouth three (3) times daily. cyclobenzaprine 5 mg tablet Commonly known as:  FLEXERIL Take 1 Tab by mouth three (3) times daily as needed for Muscle Spasm(s). ferrous sulfate 324 mg (65 mg iron) tablet 
  
labetalol 200 mg tablet Commonly known as:  NORMODYNE 
  
MULTIVITAMIN PO 
  
simvastatin 20 mg tablet Commonly known as:  ZOCOR 
  
SYMBICORT 160-4.5 mcg/actuation [facility]faa Generic drug:  budesonide-formoterol INHALE 2 PUFFS BY MOUTH TWICE A DAY 
  
venlafaxine- mg capsule Commonly known as:  EFFEXOR-XR * This list has 2 medication(s) that are the same as other medications prescribed for you. Read the directions carefully, and ask your doctor or other care provider to review them with you. Where to Get Your Medications Information about where to get these medications is not yet available Ask your nurse or doctor about these medications · Comp. [place] Dose 
  
 
_______________________________ Attestations: 
Benton Marquezestnate Melvin Steward MD acting as a scribe for and in the presence of No att. providers found November 24, 2018 at 2:54 PM 
    
Provider Attestation:     
I personally performed the services described in the documentation, reviewed the documentation, as recorded by the scribe in my presence, and it accurately and completely records my words and actions. November 24, 2018 at 2:54 PM - No att. providers found   
_______________________________

## 2018-11-24 NOTE — DISCHARGE INSTRUCTIONS
Lymphedema: Care Instructions  Your Care Instructions    Lymphedema is fluid that builds up in the arms or legs. It is often caused by surgery to remove lymph nodes during cancer treatment, especially breast cancer surgery, which can cause fluid to build up in the arm. It can happen after radiation treatment to an area that involves lymph nodes. It also can be caused by a fractured bone or surgery to fix a fracture. And some medicines also can cause lymphedema. Some people get it for unknown reasons. Normally, lymph nodes trap bacteria and other substances as fluid flows through them. Then, the white cells in the body's defense, or immune, system can destroy the substances. But if there are few or no lymph nodes--or if the lymph system in an arm or leg has been damaged--fluid can build up in the affected arm or leg. You can take simple steps at home to help treat or prevent fluid buildup. Treatment may include raising the arm or leg to let gravity drain the fluid. You also can wear compression stockings or sleeves. Follow-up care is a key part of your treatment and safety. Be sure to make and go to all appointments, and call your doctor if you are having problems. It's also a good idea to know your test results and keep a list of the medicines you take. How can you care for yourself at home? · Wear a compression stocking or sleeve as your doctor suggests. It can help keep fluid from pooling in an arm or leg. Wear it during air travel. · Prop up the swollen arm or leg on a pillow anytime you sit or lie down. Try to keep it above the level of your heart. This will help reduce swelling. · Avoid crossing your legs if your legs are swollen. · Get some exercise on most days of the week. Increase the intensity of exercise slowly. Water aerobics can help reduce swelling by helping fluid move around. Wear your compression stocking or sleeve during exercise, but not during water exercise.   · See a physical therapist. He or she can teach you how to do self-massage to help fluid move around. You also can learn what activities would be best for you. · Keep your feet clean and wear clean socks or stockings every day. Check your feet often for signs of infection, such as redness or heat. Do not walk barefoot. · If you have had lymph nodes removed from under your arm:  ? Do not have blood drawn from the arm on the side of the lymph node surgery. ? Do not allow a blood pressure cuff to be placed on that arm. If you are in the hospital, make sure your nurse and other hospital staff know of your condition. ? Wear gloves when gardening or doing other activities that may lead to cuts on your fingers or hands. · If you have had lymph nodes removed from your groin:  ? Bathe your feet daily in lukewarm, not hot, water. Use a mild soap that has a moisturizer, or use a moisturizer separately. ? Check your feet for blisters or cuts. ? Wear comfortable and supportive shoes that fit properly. ? Wear the correct size of panty hose and stockings. Avoid garters or knee-high or thigh-high stockings. · Ask your doctor how to treat any cuts, scratches, insect bites, or other injuries that may occur. · Use sunscreen and insect repellent when outdoors to protect your skin from sunburn and insect bites. · Wear medical alert jewelry that says you have lymphedema. You can buy these at most drugstores and on the Internet. When should you call for help? Call your doctor now or seek immediate medical care if:    · You have signs of infection, such as:  ? Increased pain, swelling, warmth, or redness. ? Red streaks leading from the area. ? Pus draining from the area. ? A fever.    Watch closely for changes in your health, and be sure to contact your doctor if:    · You have new or worse symptoms from lymphedema.     · You do not get better as expected. Where can you learn more?   Go to http://marty-chente.info/. Enter V398 in the search box to learn more about \"Lymphedema: Care Instructions. \"  Current as of: March 28, 2018  Content Version: 11.8  © 0170-8410 [employer], [employer]. Care instructions adapted under license by [employer] (which disclaims liability or warranty for this information). If you have questions about a medical condition or this instruction, always ask your healthcare professional. [place] [address] any warranty or liability for your use of this information.

## 2018-11-24 NOTE — ED NOTES
Discharge medications reviewed with patient and appropriate educational materials and side effects teaching were provided. I have reviewed discharge instructions with the patient. The patient verbalized understanding. Pain addressed with prescription for compression stocking to fill for home use. Pt verbalized understanding of tx of root of pain.

## 2019-01-31 ENCOUNTER — HOSPITAL ENCOUNTER (OUTPATIENT)
Dept: MAMMOGRAPHY | Age: 49
Discharge: HOME | End: 2019-01-31
Attending: NURSE PRACTITIONER
Payer: MEDICAID

## 2019-01-31 DIAGNOSIS — Z12.31 VISIT FOR SCREENING MAMMOGRAM: ICD-10-CM

## 2019-01-31 PROCEDURE — 77067 SCR MAMMO BI INCL CAD: CPT

## 2019-05-30 ENCOUNTER — APPOINTMENT (OUTPATIENT)
Dept: CT IMAGING | Age: 49
DRG: 199 | End: 2019-05-30
Attending: PHYSICIAN ASSISTANT
Payer: MEDICAID

## 2019-05-30 ENCOUNTER — HOSPITAL ENCOUNTER (INPATIENT)
Age: 49
LOS: 3 days | Discharge: HOME | DRG: 199 | End: 2019-06-02
Attending: EMERGENCY MEDICINE | Admitting: HOSPITALIST
Payer: MEDICAID

## 2019-05-30 ENCOUNTER — APPOINTMENT (OUTPATIENT)
Dept: GENERAL RADIOLOGY | Age: 49
DRG: 199 | End: 2019-05-30
Attending: PHYSICIAN ASSISTANT
Payer: MEDICAID

## 2019-05-30 DIAGNOSIS — I50.9 ACUTE CONGESTIVE HEART FAILURE, UNSPECIFIED HEART FAILURE TYPE (HCC): ICD-10-CM

## 2019-05-30 DIAGNOSIS — I63.9 CEREBROVASCULAR ACCIDENT (CVA), UNSPECIFIED MECHANISM (HCC): Primary | ICD-10-CM

## 2019-05-30 DIAGNOSIS — R77.8 ELEVATED TROPONIN: ICD-10-CM

## 2019-05-30 DIAGNOSIS — I16.0 HYPERTENSIVE URGENCY: ICD-10-CM

## 2019-05-30 PROBLEM — J44.9 COPD (CHRONIC OBSTRUCTIVE PULMONARY DISEASE) (HCC): Status: ACTIVE | Noted: 2019-05-30

## 2019-05-30 PROBLEM — I16.1 HYPERTENSIVE EMERGENCY: Status: ACTIVE | Noted: 2019-05-30

## 2019-05-30 PROBLEM — E78.5 HYPERLIPIDEMIA: Status: ACTIVE | Noted: 2019-05-30

## 2019-05-30 PROBLEM — I67.9 CEREBROVASCULAR DISEASE: Status: ACTIVE | Noted: 2019-05-30

## 2019-05-30 LAB
ALBUMIN SERPL-MCNC: 3 G/DL (ref 3.4–5)
ALBUMIN/GLOB SERPL: 0.6 {RATIO} (ref 0.8–1.7)
ALP SERPL-CCNC: 82 U/L (ref 45–117)
ALT SERPL-CCNC: 13 U/L (ref 13–56)
ANION GAP SERPL CALC-SCNC: 7 MMOL/L (ref 3–18)
APPEARANCE UR: CLEAR
AST SERPL-CCNC: 24 U/L (ref 15–37)
BACTERIA URNS QL MICRO: NEGATIVE /HPF
BASOPHILS # BLD: 0 K/UL (ref 0–0.1)
BASOPHILS NFR BLD: 0 % (ref 0–2)
BILIRUB SERPL-MCNC: 1 MG/DL (ref 0.2–1)
BILIRUB UR QL: NEGATIVE
BNP SERPL-MCNC: ABNORMAL PG/ML (ref 0–900)
BUN SERPL-MCNC: 14 MG/DL (ref 7–18)
BUN/CREAT SERPL: 9 (ref 12–20)
CALCIUM SERPL-MCNC: 9.2 MG/DL (ref 8.5–10.1)
CHLORIDE SERPL-SCNC: 99 MMOL/L (ref 100–108)
CK MB CFR SERPL CALC: ABNORMAL % (ref 0–4)
CK MB SERPL-MCNC: <1 NG/ML (ref 5–25)
CK SERPL-CCNC: 121 U/L (ref 26–192)
CO2 SERPL-SCNC: 33 MMOL/L (ref 21–32)
COLOR UR: YELLOW
CREAT SERPL-MCNC: 1.63 MG/DL (ref 0.6–1.3)
DIFFERENTIAL METHOD BLD: ABNORMAL
EOSINOPHIL # BLD: 0 K/UL (ref 0–0.4)
EOSINOPHIL NFR BLD: 0 % (ref 0–5)
EPITH CASTS URNS QL MICRO: NORMAL /LPF (ref 0–5)
ERYTHROCYTE [DISTWIDTH] IN BLOOD BY AUTOMATED COUNT: 18.2 % (ref 11.6–14.5)
GLOBULIN SER CALC-MCNC: 5.4 G/DL (ref 2–4)
GLUCOSE SERPL-MCNC: 105 MG/DL (ref 74–99)
GLUCOSE UR STRIP.AUTO-MCNC: NEGATIVE MG/DL
HCG SERPL QL: NEGATIVE
HCT VFR BLD AUTO: 29.4 % (ref 35–45)
HGB BLD-MCNC: 8.4 G/DL (ref 12–16)
HGB UR QL STRIP: ABNORMAL
KETONES UR QL STRIP.AUTO: NEGATIVE MG/DL
LEUKOCYTE ESTERASE UR QL STRIP.AUTO: NEGATIVE
LYMPHOCYTES # BLD: 0.8 K/UL (ref 0.9–3.6)
LYMPHOCYTES NFR BLD: 6 % (ref 21–52)
MCH RBC QN AUTO: 19 PG (ref 24–34)
MCHC RBC AUTO-ENTMCNC: 28.6 G/DL (ref 31–37)
MCV RBC AUTO: 66.7 FL (ref 74–97)
MONOCYTES # BLD: 1 K/UL (ref 0.05–1.2)
MONOCYTES NFR BLD: 8 % (ref 3–10)
NEUTS SEG # BLD: 11.2 K/UL (ref 1.8–8)
NEUTS SEG NFR BLD: 86 % (ref 40–73)
NITRITE UR QL STRIP.AUTO: NEGATIVE
PH UR STRIP: 7 [PH] (ref 5–8)
PLATELET # BLD AUTO: 292 K/UL (ref 135–420)
PLATELET COMMENTS,PCOM: ABNORMAL
POTASSIUM SERPL-SCNC: 3.7 MMOL/L (ref 3.5–5.5)
PROT SERPL-MCNC: 8.4 G/DL (ref 6.4–8.2)
PROT UR STRIP-MCNC: 300 MG/DL
RBC # BLD AUTO: 4.41 M/UL (ref 4.2–5.3)
RBC #/AREA URNS HPF: NORMAL /HPF (ref 0–5)
RBC MORPH BLD: ABNORMAL
SODIUM SERPL-SCNC: 139 MMOL/L (ref 136–145)
SP GR UR REFRACTOMETRY: 1.01 (ref 1–1.03)
TROPONIN I SERPL-MCNC: 0.05 NG/ML (ref 0–0.04)
UROBILINOGEN UR QL STRIP.AUTO: 1 EU/DL (ref 0.2–1)
WBC # BLD AUTO: 13 K/UL (ref 4.6–13.2)
WBC URNS QL MICRO: NORMAL /HPF (ref 0–4)

## 2019-05-30 PROCEDURE — 70450 CT HEAD/BRAIN W/O DYE: CPT

## 2019-05-30 PROCEDURE — 83036 HEMOGLOBIN GLYCOSYLATED A1C: CPT

## 2019-05-30 PROCEDURE — 99285 EMERGENCY DEPT VISIT HI MDM: CPT

## 2019-05-30 PROCEDURE — 93005 ELECTROCARDIOGRAM TRACING: CPT

## 2019-05-30 PROCEDURE — 65660000001 HC RM ICU INTERMED STEPDOWN

## 2019-05-30 PROCEDURE — 82550 ASSAY OF CK (CPK): CPT

## 2019-05-30 PROCEDURE — 81001 URINALYSIS AUTO W/SCOPE: CPT

## 2019-05-30 PROCEDURE — 83880 ASSAY OF NATRIURETIC PEPTIDE: CPT

## 2019-05-30 PROCEDURE — 85025 COMPLETE CBC W/AUTO DIFF WBC: CPT

## 2019-05-30 PROCEDURE — 84703 CHORIONIC GONADOTROPIN ASSAY: CPT

## 2019-05-30 PROCEDURE — 74011250637 HC RX REV CODE- 250/637: Performed by: PHYSICIAN ASSISTANT

## 2019-05-30 PROCEDURE — 71045 X-RAY EXAM CHEST 1 VIEW: CPT

## 2019-05-30 PROCEDURE — 80053 COMPREHEN METABOLIC PANEL: CPT

## 2019-05-30 RX ORDER — ASPIRIN 325 MG
325 TABLET ORAL
Status: COMPLETED | OUTPATIENT
Start: 2019-05-30 | End: 2019-05-30

## 2019-05-30 RX ORDER — SIMVASTATIN 10 MG/1
20 TABLET, FILM COATED ORAL
Status: COMPLETED | OUTPATIENT
Start: 2019-05-30 | End: 2019-05-30

## 2019-05-30 RX ORDER — LABETALOL 100 MG/1
300 TABLET, FILM COATED ORAL ONCE
Status: COMPLETED | OUTPATIENT
Start: 2019-05-30 | End: 2019-05-30

## 2019-05-30 RX ORDER — LABETALOL HCL 20 MG/4 ML
20 SYRINGE (ML) INTRAVENOUS
Status: DISCONTINUED | OUTPATIENT
Start: 2019-05-30 | End: 2019-05-30

## 2019-05-30 RX ADMIN — ASPIRIN 325 MG ORAL TABLET 325 MG: 325 PILL ORAL at 19:56

## 2019-05-30 RX ADMIN — SIMVASTATIN 20 MG: 10 TABLET, FILM COATED ORAL at 17:54

## 2019-05-30 RX ADMIN — LABETALOL HYDROCHLORIDE 300 MG: 100 TABLET, FILM COATED ORAL at 17:54

## 2019-05-30 NOTE — ED NOTES
First contact with patient, Pt awake and alert, pt confused to time oriented to person, place, and situation.  Waiting neuro consult, Pt is HTN on monitor

## 2019-05-30 NOTE — ED NOTES
I performed a brief evaluation, including history and physical, of the patient here in triage and I have determined that pt will need further treatment and evaluation from the main side ER physician. I have placed initial orders to help in expediting patients care.      May 30, 2019 at 4:15 PM - KYRA Jimenez        Visit Vitals  BP (!) 233/154 (BP 1 Location: Right arm, BP Patient Position: At rest)   Pulse (!) 106   Temp 98.9 °F (37.2 °C)   Resp 17   Ht 5' 4\" (1.626 m)   Wt 146.1 kg (322 lb)   SpO2 92%   BMI 55.27 kg/m²           Orders Placed This Encounter    CBC WITH AUTOMATED DIFF    METABOLIC PANEL, BASIC    URINALYSIS W/ RFLX MICROSCOPIC    EKG, 12 LEAD, INITIAL

## 2019-05-30 NOTE — ED NOTES
[facility] sepsis popup appeared, provider made aware, do not initiate sepsis bundle at this time per provider

## 2019-05-30 NOTE — ED PROVIDER NOTES
EMERGENCY DEPARTMENT HISTORY AND PHYSICAL EXAM    Date: 5/30/2019  Patient Name: Savannah Victoria    History of Presenting Illness     Chief Complaint   Patient presents with    Leg Swelling         History Provided By: Patient and Patient's Daughter    Chief Complaint: \"Not feeling well\"   Duration: 3 Days  Timing:  Constant and Worsening  Associated Symptoms: AMS, BL lower extremity edema      HPI: Savannah Victoria is a 44 y.o. female with a PMH of morbid obesity, uterine fibroid, diabetes, hypertension, obesity, renal insufficiency and CVA, lymphedema, sarcoidosis, hyperthyroidism who presents with fatigue, subjective fever with chills, SOB, lower extremity edema, and AMS. Patient states she has just \"not been feeling well\" for 3 days. Patient states her severe LE edema is at baseline today but was approximately twice as large yesterday and subsided after elevation overnight. Patient lives with her daughter who also c/o AMS x 1 day. Daughter states that the patient has some memory loss since her CVA 5 years ago but was extremely confused today. Daughter noted that patient attempted to put on her grandsons clothes when getting dressed and couldn't figure out how to put her seatbelt on in the car. Patient states her blood pressure is usually well controlled at home but that she forgot to take her labetalol this morning. Patient states she does not see a gynecologist and was unaware of the uterine fibroid noted on abdominal ultrasound 7/2018. Patient states she has irregular menstruation with increased frequency, heavy flow and moderate pain. LMP = 5/23/19. Patient and daughter deny chest pain, palpitations, visual or auditory disturbances, dizziness, syncope, facial asymmetry, difficulty with speech, muscle weakness, paresthesia, urinary sx, flank pain, hematuria, N/V/D/C, blood in stool, hx DVT/PE or any other deficits s/p CVA.     PCP: Marvel Pruett NP    Current Outpatient Medications   Medication Sig Dispense Refill    acetaminophen (TYLENOL) 325 mg tablet Take 2 Tabs by mouth every four (4) hours as needed for Pain. 100 Tab 0    SYMBICORT 160-4.5 mcg/actuation HFAA INHALE 2 PUFFS BY MOUTH TWICE A DAY 10.2 Inhaler 5    amLODIPine (NORVASC) 10 mg tablet Take 1 Tab by mouth daily. 30 Tab 0    acetaminophen (TYLENOL) 325 mg tablet Take 2 Tabs by mouth every four (4) hours as needed for Pain. 20 Tab 0    cyclobenzaprine (FLEXERIL) 5 mg tablet Take 1 Tab by mouth three (3) times daily as needed for Muscle Spasm(s). 12 Tab 0    MULTIVITAMIN PO Take  by mouth.  acyclovir (ZOVIRAX) 400 mg tablet Take 400 mg by mouth daily.  ferrous sulfate 324 mg (65 mg iron) tablet Take 324 mg by mouth Daily (before breakfast).  venlafaxine-SR (EFFEXOR-XR) 150 mg capsule Take  by mouth daily.  labetalol (NORMODYNE) 200 mg tablet Take 300 mg by mouth two (2) times a day.  simvastatin (ZOCOR) 20 mg tablet Take 20 mg by mouth nightly. Past History     Past Medical History:  Past Medical History:   Diagnosis Date    Anemia     Bone lesion     Chronic kidney disease     Depressed     Diabetes ([address]     Diabetes mellitus ([address]     Hypertension     Inverted nipple     RT nipple inverted - pt states that it has always been that way    Kidney problem     Pneumonia     Sarcoidosis     Thyroid disorder     Uterus problem        Past Surgical History:  Past Surgical History:   Procedure Laterality Date    HX  SECTION      HX OTHER SURGICAL      neck biopsy    HX TUBAL LIGATION         Family History:  Family History   Problem Relation Age of Onset    Hypertension Mother     Diabetes Mother     Cancer Mother         lung    Breast Cancer Sister 27       Social History:  Social History     Tobacco Use    Smoking status: Former Smoker     Types: Cigarettes    Smokeless tobacco: Former User   Substance Use Topics    Alcohol use: Yes     Comment: beer rarely    Drug use:  No Allergies:  No Known Allergies      Review of Systems   Review of Systems   Constitutional: Positive for chills, fatigue and fever (subjective). Negative for diaphoresis. HENT: Positive for sore throat. Negative for ear pain, facial swelling, hearing loss, sinus pain, tinnitus, trouble swallowing and voice change. Eyes: Negative for photophobia, pain and visual disturbance. Respiratory: Positive for shortness of breath. Negative for cough, chest tightness and wheezing. Cardiovascular: Positive for leg swelling. Negative for chest pain and palpitations. Gastrointestinal: Negative for abdominal pain, blood in stool, constipation, diarrhea, nausea and vomiting. Genitourinary: Negative for decreased urine volume, dysuria, flank pain, frequency, hematuria, pelvic pain and urgency. Musculoskeletal: Negative for gait problem, neck pain and neck stiffness. Skin: Positive for wound (weeping wound healing on right lower extremity). Negative for color change, pallor and rash. Neurological: Negative for dizziness, syncope, facial asymmetry, speech difficulty, weakness, light-headedness, numbness and headaches. Hematological: Does not bruise/bleed easily. Psychiatric/Behavioral: Positive for confusion. All other systems reviewed and are negative. Physical Exam     Vitals:    05/30/19 1800 05/30/19 1830 05/30/19 1900 05/30/19 1930   BP: (!) 218/130 (!) 219/138 (!) 205/113 (!) 203/119   Pulse: 99  89 89   Resp:   29 28   Temp:       SpO2: 96%  94% 95%   Weight:       Height:         Physical Exam   Constitutional: She is oriented to person, place, and time. She appears well-developed and well-nourished. Non-toxic appearance. She does not have a sickly appearance. No distress. Morbidly obese   HENT:   Head: Normocephalic and atraumatic.    Mouth/Throat: Uvula is midline, oropharynx is clear and moist and mucous membranes are normal.   Eyes: Conjunctivae and EOM are normal.   Neck: Trachea normal and full passive range of motion without pain. Neck supple. No JVD present. No Brudzinski's sign and no Kernig's sign noted. Cardiovascular: Regular rhythm, normal heart sounds and normal pulses. Tachycardia present. No murmur heard. Pulses:       Radial pulses are 2+ on the right side, and 2+ on the left side. Dorsalis pedis pulses are 2+ on the right side, and 2+ on the left side. Hypertensive   Pulmonary/Chest: Effort normal. No accessory muscle usage. No respiratory distress. She has no decreased breath sounds. She has no wheezes (expiratory). She has no rhonchi. She has no rales. Speaks in full sentences   Abdominal: Bowel sounds are normal. She exhibits mass (large pelvic mass ). She exhibits no distension. There is no tenderness. There is no rebound, no guarding and no CVA tenderness. Musculoskeletal: Normal range of motion. She exhibits edema (4+ BL lower extremity) and tenderness (BL calf). She exhibits no deformity. Left lower leg mildly erythematous compared to right on exam; warmer to touch concerning for developing cellulitis; unable to obtain DP and PT pulses BL due to size of swelling and lymphedema   Lymphadenopathy:     She has no cervical adenopathy. Neurological: She is alert and oriented to person, place, and time. She has normal strength. No sensory deficit. She displays a negative Romberg sign. Reflex Scores:       Bicep reflexes are 2+ on the right side and 2+ on the left side. Achilles reflexes are 2+ on the right side and 2+ on the left side. Skin: Skin is warm and dry. She is not diaphoretic. Psychiatric: She has a normal mood and affect.  Her speech is normal. Cognition and memory are normal.         Diagnostic Study Results     Labs -     Recent Results (from the past 12 hour(s))   EKG, 12 LEAD, INITIAL    Collection Time: 05/30/19  4:48 PM   Result Value Ref Range    Ventricular Rate 105 BPM    Atrial Rate 105 BPM    P-R Interval 170 ms QRS Duration 104 ms    Q-T Interval 366 ms    QTC Calculation (Bezet) 483 ms    Calculated P Axis 68 degrees    Calculated R Axis -64 degrees    Calculated T Axis 97 degrees    Diagnosis       Sinus tachycardia with premature ventricular complexes or fusion complexes  Left anterior fascicular block  Left ventricular hypertrophy with repolarization abnormality  Abnormal ECG  When compared with ECG of 28-JUL-2018 11:53,  fusion complexes are now present  premature ventricular complexes are now present  T wave inversion more evident in Lateral leads     CBC WITH AUTOMATED DIFF    Collection Time: 05/30/19  5:09 PM   Result Value Ref Range    WBC 13.0 4.6 - 13.2 K/uL    RBC 4.41 4.20 - 5.30 M/uL    HGB 8.4 (L) 12.0 - 16.0 g/dL    HCT 29.4 (L) 35.0 - 45.0 %    MCV 66.7 (L) 74.0 - 97.0 FL    MCH 19.0 (L) 24.0 - 34.0 PG    MCHC 28.6 (L) 31.0 - 37.0 g/dL    RDW 18.2 (H) 11.6 - 14.5 %    PLATELET 535 339 - 947 K/uL    NEUTROPHILS 86 (H) 40 - 73 %    LYMPHOCYTES 6 (L) 21 - 52 %    MONOCYTES 8 3 - 10 %    EOSINOPHILS 0 0 - 5 %    BASOPHILS 0 0 - 2 %    ABS. NEUTROPHILS 11.2 (H) 1.8 - 8.0 K/UL    ABS. LYMPHOCYTES 0.8 (L) 0.9 - 3.6 K/UL    ABS. MONOCYTES 1.0 0.05 - 1.2 K/UL    ABS. EOSINOPHILS 0.0 0.0 - 0.4 K/UL    ABS.  BASOPHILS 0.0 0.0 - 0.1 K/UL    PLATELET COMMENTS ADEQUATE PLATELETS      RBC COMMENTS ANISOCYTOSIS  2+        RBC COMMENTS MICROCYTOSIS  1+        RBC COMMENTS HYPOCHROMIA  1+        DF MANUAL     NT-PRO BNP    Collection Time: 05/30/19  5:09 PM   Result Value Ref Range    NT pro-BNP 12,208 (H) 0 - 900 PG/ML   CARDIAC PANEL,(CK, CKMB & TROPONIN)    Collection Time: 05/30/19  5:09 PM   Result Value Ref Range     26 - 192 U/L    CK - MB <1.0 <3.6 ng/ml    CK-MB Index  0.0 - 4.0 %     CALCULATION NOT PERFORMED WHEN RESULT IS BELOW LINEAR LIMIT    Troponin-I, QT 0.05 (H) 0.0 - 5.355 NG/ML   METABOLIC PANEL, COMPREHENSIVE    Collection Time: 05/30/19  5:09 PM   Result Value Ref Range    Sodium 139 136 - 145 mmol/L    Potassium 3.7 3.5 - 5.5 mmol/L    Chloride 99 (L) 100 - 108 mmol/L    CO2 33 (H) 21 - 32 mmol/L    Anion gap 7 3.0 - 18 mmol/L    Glucose 105 (H) 74 - 99 mg/dL    BUN 14 7.0 - 18 MG/DL    Creatinine 1.63 (H) 0.6 - 1.3 MG/DL    BUN/Creatinine ratio 9 (L) 12 - 20      GFR est AA 40 (L) >60 ml/min/1.73m2    GFR est non-AA 33 (L) >60 ml/min/1.73m2    Calcium 9.2 8.5 - 10.1 MG/DL    Bilirubin, total 1.0 0.2 - 1.0 MG/DL    ALT (SGPT) 13 13 - 56 U/L    AST (SGOT) 24 15 - 37 U/L    Alk.  phosphatase 82 45 - 117 U/L    Protein, total 8.4 (H) 6.4 - 8.2 g/dL    Albumin 3.0 (L) 3.4 - 5.0 g/dL    Globulin 5.4 (H) 2.0 - 4.0 g/dL    A-G Ratio 0.6 (L) 0.8 - 1.7     HCG QL SERUM    Collection Time: 05/30/19  5:09 PM   Result Value Ref Range    HCG, Ql. NEGATIVE  NEG     URINALYSIS W/ RFLX MICROSCOPIC    Collection Time: 05/30/19  5:42 PM   Result Value Ref Range    Color YELLOW      Appearance CLEAR      Specific gravity 1.011 1.005 - 1.030      pH (UA) 7.0 5.0 - 8.0      Protein 300 (A) NEG mg/dL    Glucose NEGATIVE  NEG mg/dL    Ketone NEGATIVE  NEG mg/dL    Bilirubin NEGATIVE  NEG      Blood MODERATE (A) NEG      Urobilinogen 1.0 0.2 - 1.0 EU/dL    Nitrites NEGATIVE  NEG      Leukocyte Esterase NEGATIVE  NEG     URINE MICROSCOPIC ONLY    Collection Time: 05/30/19  5:42 PM   Result Value Ref Range    WBC 0 to 3 0 - 4 /hpf    RBC 2 to 4 0 - 5 /hpf    Epithelial cells FEW 0 - 5 /lpf    Bacteria NEGATIVE  NEG /hpf       Radiologic Studies -   XR CHEST PORT    (Results Pending)   CT HEAD WO CONT    (Results Pending)     CT Results  (Last 48 hours)    None        CXR Results  (Last 48 hours)    None          7/30/2018 Abd US   - A large heterogenous, mass with partially circumscribed and somewhat  indistinct margins with a central mildly complex anechoic cavity with possible  internal debris is seen extending from the uterus into the right adnexa and  measuring 17.9 x 19.6 x 17.5 cm., Previous reported measurement of 14.5 x 13.1 x 13.8 cm a 1/4/2015 . Patient has a history of uterine fibroids, and reportedly had a uterine fibroid embolization on 3/5/2014. Medical Decision Making   I am the first provider for this patient. I reviewed the vital signs, available nursing notes, past medical history, past surgical history, family history and social history. Vital Signs-Reviewed the patient's vital signs. Records Reviewed: Nursing Notes and Old Medical Records     1003 PM  57-year-old female who presents the ER with her family for evaluation of generalized fatigue and not feeling well. Family members state they are concerned due to her increased confusion that has been going off the last few days. No recent falls or head injuries. Has not taken her hypertension medication as prescribed today. We will plan on labs, imaging and urinalysis to rule out any sort of infection at this time. Left lower extremity mildly erythematous and warm to the touch compared to the right concerning for new onset of cellulitis. Patient with chronic history of lymphedema. No signs of sepsis on exam.  Will not initiate bundle at this time. Kirk Gallegos PA-C     8:13 PM  Received phone call from radiology regarding patient's CT of her head results. Concern for right thalamic hypodensity which is new since CT of her head in January 2014. Discussed patient with attending who advised tele-neurology consult. Patient seen by tele-neurology who recommended inpatient stroke work-up due to her history and new findings on CT scan. Also advised to administer aspirin however no further blood pressure medications at this time as she does not have any signs of new end organ damage. Patient also noted to have elevated BNP above 12,000 and x-ray findings consistent with fluid overload. Discussed patient with admitting hospitalist, Dr. Neida Hayes who agrees for admission at this time.   Discussed all results and imaging with patient and family members at bedside. Sara Wilson PA-C    Disposition:  Admitted    CRITICAL CARE NOTE :    8:17 PM      IMPENDING DETERIORATION -CNS and Metabolic    ASSOCIATED RISK FACTORS - Bleeding, Dysrhythmia and Metabolic changes    MANAGEMENT- Bedside Assessment and Supervision of Care    INTERPRETATION -  Xrays, CT Scan, ECG and Blood Pressure    INTERVENTIONS - hemodynamic mngmt and Neurologic interventions     CASE REVIEW - Hospitalist    TREATMENT RESPONSE -Unchanged     PERFORMED BY - Self        NOTES   :      I have spent 45 minutes of critical care time involved in lab review, consultations with specialist, family decision- making, bedside attention and documentation. During this entire length of time I was immediately available to the patient . Sara Wilson PA-C          Follow-up Information    None         Current Discharge Medication List          Provider Notes (Medical Decision Making):     Procedures:  Procedures        Diagnosis     Clinical Impression:   1. Cerebrovascular accident (CVA), unspecified mechanism ([address]    2. Acute congestive heart failure, unspecified heart failure type ([address]    3. Hypertensive urgency    4.  Elevated troponin

## 2019-05-31 ENCOUNTER — APPOINTMENT (OUTPATIENT)
Dept: MRI IMAGING | Age: 49
DRG: 199 | End: 2019-05-31
Attending: HOSPITALIST
Payer: MEDICAID

## 2019-05-31 PROBLEM — I10 HTN (HYPERTENSION): Status: ACTIVE | Noted: 2019-05-31

## 2019-05-31 LAB
AMMONIA PLAS-SCNC: 45 UMOL/L (ref 11–32)
ANION GAP SERPL CALC-SCNC: 9 MMOL/L (ref 3–18)
ATRIAL RATE: 105 BPM
BASOPHILS # BLD: 0 K/UL (ref 0–0.1)
BASOPHILS NFR BLD: 0 % (ref 0–2)
BUN SERPL-MCNC: 13 MG/DL (ref 7–18)
BUN/CREAT SERPL: 8 (ref 12–20)
CALCIUM SERPL-MCNC: 9 MG/DL (ref 8.5–10.1)
CALCULATED P AXIS, ECG09: 68 DEGREES
CALCULATED R AXIS, ECG10: -64 DEGREES
CALCULATED T AXIS, ECG11: 97 DEGREES
CHLORIDE SERPL-SCNC: 101 MMOL/L (ref 100–108)
CHOLEST SERPL-MCNC: 138 MG/DL
CO2 SERPL-SCNC: 30 MMOL/L (ref 21–32)
CREAT SERPL-MCNC: 1.55 MG/DL (ref 0.6–1.3)
DIAGNOSIS, 93000: NORMAL
DIFFERENTIAL METHOD BLD: ABNORMAL
EOSINOPHIL # BLD: 0.1 K/UL (ref 0–0.4)
EOSINOPHIL NFR BLD: 1 % (ref 0–5)
ERYTHROCYTE [DISTWIDTH] IN BLOOD BY AUTOMATED COUNT: 17.9 % (ref 11.6–14.5)
EST. AVERAGE GLUCOSE BLD GHB EST-MCNC: 108 MG/DL
GLUCOSE SERPL-MCNC: 90 MG/DL (ref 74–99)
HBA1C MFR BLD: 5.4 % (ref 4.2–5.6)
HCT VFR BLD AUTO: 27.8 % (ref 35–45)
HDLC SERPL-MCNC: 37 MG/DL (ref 40–60)
HDLC SERPL: 3.7 {RATIO} (ref 0–5)
HGB BLD-MCNC: 8 G/DL (ref 12–16)
LDLC SERPL CALC-MCNC: 82.8 MG/DL (ref 0–100)
LIPID PROFILE,FLP: ABNORMAL
LYMPHOCYTES # BLD: 1 K/UL (ref 0.9–3.6)
LYMPHOCYTES NFR BLD: 10 % (ref 21–52)
MCH RBC QN AUTO: 19.2 PG (ref 24–34)
MCHC RBC AUTO-ENTMCNC: 28.8 G/DL (ref 31–37)
MCV RBC AUTO: 66.8 FL (ref 74–97)
MONOCYTES # BLD: 0.8 K/UL (ref 0.05–1.2)
MONOCYTES NFR BLD: 9 % (ref 3–10)
NEUTS SEG # BLD: 7.5 K/UL (ref 1.8–8)
NEUTS SEG NFR BLD: 80 % (ref 40–73)
P-R INTERVAL, ECG05: 170 MS
PLATELET # BLD AUTO: 245 K/UL (ref 135–420)
POTASSIUM SERPL-SCNC: 3.3 MMOL/L (ref 3.5–5.5)
Q-T INTERVAL, ECG07: 366 MS
QRS DURATION, ECG06: 104 MS
QTC CALCULATION (BEZET), ECG08: 483 MS
RBC # BLD AUTO: 4.16 M/UL (ref 4.2–5.3)
SODIUM SERPL-SCNC: 140 MMOL/L (ref 136–145)
T4 FREE SERPL-MCNC: 1.7 NG/DL (ref 0.7–1.5)
TRIGL SERPL-MCNC: 91 MG/DL (ref ?–150)
TSH SERPL DL<=0.05 MIU/L-ACNC: 1.96 UIU/ML (ref 0.36–3.74)
VENTRICULAR RATE, ECG03: 105 BPM
VLDLC SERPL CALC-MCNC: 18.2 MG/DL
WBC # BLD AUTO: 9.4 K/UL (ref 4.6–13.2)

## 2019-05-31 PROCEDURE — 84443 ASSAY THYROID STIM HORMONE: CPT

## 2019-05-31 PROCEDURE — 36415 COLL VENOUS BLD VENIPUNCTURE: CPT

## 2019-05-31 PROCEDURE — 94640 AIRWAY INHALATION TREATMENT: CPT

## 2019-05-31 PROCEDURE — 94762 N-INVAS EAR/PLS OXIMTRY CONT: CPT

## 2019-05-31 PROCEDURE — 94664 DEMO&/EVAL PT USE INHALER: CPT

## 2019-05-31 PROCEDURE — 74011250637 HC RX REV CODE- 250/637: Performed by: INTERNAL MEDICINE

## 2019-05-31 PROCEDURE — 84439 ASSAY OF FREE THYROXINE: CPT

## 2019-05-31 PROCEDURE — 65660000001 HC RM ICU INTERMED STEPDOWN

## 2019-05-31 PROCEDURE — 92526 ORAL FUNCTION THERAPY: CPT

## 2019-05-31 PROCEDURE — 85025 COMPLETE CBC W/AUTO DIFF WBC: CPT

## 2019-05-31 PROCEDURE — 74011000250 HC RX REV CODE- 250: Performed by: HOSPITALIST

## 2019-05-31 PROCEDURE — 82140 ASSAY OF AMMONIA: CPT

## 2019-05-31 PROCEDURE — 97166 OT EVAL MOD COMPLEX 45 MIN: CPT

## 2019-05-31 PROCEDURE — 80061 LIPID PANEL: CPT

## 2019-05-31 PROCEDURE — 70551 MRI BRAIN STEM W/O DYE: CPT

## 2019-05-31 PROCEDURE — 74011250637 HC RX REV CODE- 250/637: Performed by: HOSPITALIST

## 2019-05-31 PROCEDURE — 74011250636 HC RX REV CODE- 250/636: Performed by: INTERNAL MEDICINE

## 2019-05-31 PROCEDURE — 97535 SELF CARE MNGMENT TRAINING: CPT

## 2019-05-31 PROCEDURE — 92610 EVALUATE SWALLOWING FUNCTION: CPT

## 2019-05-31 PROCEDURE — 77010033678 HC OXYGEN DAILY

## 2019-05-31 PROCEDURE — 80048 BASIC METABOLIC PNL TOTAL CA: CPT

## 2019-05-31 RX ORDER — ASPIRIN 325 MG
325 TABLET ORAL DAILY
Status: DISCONTINUED | OUTPATIENT
Start: 2019-05-31 | End: 2019-06-02 | Stop reason: HOSPADM

## 2019-05-31 RX ORDER — BUDESONIDE 0.5 MG/2ML
500 INHALANT ORAL
Status: DISCONTINUED | OUTPATIENT
Start: 2019-05-31 | End: 2019-06-02 | Stop reason: HOSPADM

## 2019-05-31 RX ORDER — BUDESONIDE AND FORMOTEROL FUMARATE DIHYDRATE 160; 4.5 UG/1; UG/1
2 AEROSOL RESPIRATORY (INHALATION) 2 TIMES DAILY
Status: DISCONTINUED | OUTPATIENT
Start: 2019-05-31 | End: 2019-05-31 | Stop reason: CLARIF

## 2019-05-31 RX ORDER — ACYCLOVIR 200 MG/1
400 CAPSULE ORAL DAILY
Status: DISCONTINUED | OUTPATIENT
Start: 2019-05-31 | End: 2019-06-02 | Stop reason: HOSPADM

## 2019-05-31 RX ORDER — ATORVASTATIN CALCIUM 40 MG/1
80 TABLET, FILM COATED ORAL DAILY
Status: DISCONTINUED | OUTPATIENT
Start: 2019-06-01 | End: 2019-05-31

## 2019-05-31 RX ORDER — SODIUM CHLORIDE 0.9 % (FLUSH) 0.9 %
5-40 SYRINGE (ML) INJECTION AS NEEDED
Status: DISCONTINUED | OUTPATIENT
Start: 2019-05-31 | End: 2019-06-02 | Stop reason: HOSPADM

## 2019-05-31 RX ORDER — ATORVASTATIN CALCIUM 40 MG/1
80 TABLET, FILM COATED ORAL DAILY
Status: DISCONTINUED | OUTPATIENT
Start: 2019-05-31 | End: 2019-06-02 | Stop reason: HOSPADM

## 2019-05-31 RX ORDER — ARFORMOTEROL TARTRATE 15 UG/2ML
15 SOLUTION RESPIRATORY (INHALATION)
Status: DISCONTINUED | OUTPATIENT
Start: 2019-05-31 | End: 2019-06-02 | Stop reason: HOSPADM

## 2019-05-31 RX ORDER — ACYCLOVIR 200 MG/1
400 CAPSULE ORAL DAILY
Status: DISCONTINUED | OUTPATIENT
Start: 2019-05-31 | End: 2019-05-31

## 2019-05-31 RX ORDER — CYCLOBENZAPRINE HCL 10 MG
5 TABLET ORAL
Status: DISCONTINUED | OUTPATIENT
Start: 2019-05-31 | End: 2019-06-02 | Stop reason: HOSPADM

## 2019-05-31 RX ORDER — ACETAMINOPHEN 325 MG/1
650 TABLET ORAL
Status: DISCONTINUED | OUTPATIENT
Start: 2019-05-31 | End: 2019-06-02 | Stop reason: HOSPADM

## 2019-05-31 RX ORDER — LABETALOL HCL 20 MG/4 ML
10 SYRINGE (ML) INTRAVENOUS
Status: DISCONTINUED | OUTPATIENT
Start: 2019-05-31 | End: 2019-06-02 | Stop reason: HOSPADM

## 2019-05-31 RX ORDER — AMLODIPINE BESYLATE 10 MG/1
10 TABLET ORAL DAILY
Status: DISCONTINUED | OUTPATIENT
Start: 2019-05-31 | End: 2019-06-02 | Stop reason: HOSPADM

## 2019-05-31 RX ORDER — VENLAFAXINE HYDROCHLORIDE 150 MG/1
150 CAPSULE, EXTENDED RELEASE ORAL DAILY
Status: DISCONTINUED | OUTPATIENT
Start: 2019-05-31 | End: 2019-06-02 | Stop reason: HOSPADM

## 2019-05-31 RX ORDER — SODIUM CHLORIDE 0.9 % (FLUSH) 0.9 %
5-40 SYRINGE (ML) INJECTION EVERY 8 HOURS
Status: DISCONTINUED | OUTPATIENT
Start: 2019-05-31 | End: 2019-06-02 | Stop reason: HOSPADM

## 2019-05-31 RX ORDER — POTASSIUM CHLORIDE 750 MG/1
40 TABLET, FILM COATED, EXTENDED RELEASE ORAL
Status: COMPLETED | OUTPATIENT
Start: 2019-05-31 | End: 2019-05-31

## 2019-05-31 RX ORDER — LABETALOL 100 MG/1
300 TABLET, FILM COATED ORAL 2 TIMES DAILY
Status: DISCONTINUED | OUTPATIENT
Start: 2019-05-31 | End: 2019-06-02 | Stop reason: HOSPADM

## 2019-05-31 RX ADMIN — ARFORMOTEROL TARTRATE 15 MCG: 15 SOLUTION RESPIRATORY (INHALATION) at 20:40

## 2019-05-31 RX ADMIN — AMLODIPINE BESYLATE 10 MG: 10 TABLET ORAL at 10:20

## 2019-05-31 RX ADMIN — ACETAMINOPHEN 650 MG: 325 TABLET, FILM COATED ORAL at 11:49

## 2019-05-31 RX ADMIN — BUDESONIDE 500 MCG: 0.5 INHALANT RESPIRATORY (INHALATION) at 20:40

## 2019-05-31 RX ADMIN — LABETALOL 20 MG/4 ML (5 MG/ML) INTRAVENOUS SYRINGE 10 MG: at 16:37

## 2019-05-31 RX ADMIN — Medication 10 ML: at 06:26

## 2019-05-31 RX ADMIN — LABETALOL HYDROCHLORIDE 300 MG: 100 TABLET, FILM COATED ORAL at 10:20

## 2019-05-31 RX ADMIN — Medication 10 ML: at 16:36

## 2019-05-31 RX ADMIN — ASPIRIN 325 MG ORAL TABLET 325 MG: 325 PILL ORAL at 16:36

## 2019-05-31 RX ADMIN — ACYCLOVIR 400 MG: 200 CAPSULE ORAL at 14:38

## 2019-05-31 RX ADMIN — ATORVASTATIN CALCIUM 80 MG: 40 TABLET, FILM COATED ORAL at 10:20

## 2019-05-31 RX ADMIN — LABETALOL HYDROCHLORIDE 300 MG: 100 TABLET, FILM COATED ORAL at 18:03

## 2019-05-31 RX ADMIN — BUDESONIDE 500 MCG: 0.5 INHALANT RESPIRATORY (INHALATION) at 10:31

## 2019-05-31 RX ADMIN — VENLAFAXINE HYDROCHLORIDE 150 MG: 150 CAPSULE, EXTENDED RELEASE ORAL at 10:21

## 2019-05-31 RX ADMIN — POTASSIUM CHLORIDE 40 MEQ: 750 TABLET, EXTENDED RELEASE ORAL at 16:36

## 2019-05-31 RX ADMIN — Medication 10 ML: at 22:00

## 2019-05-31 RX ADMIN — Medication 10 ML: at 01:00

## 2019-05-31 RX ADMIN — CYCLOBENZAPRINE HYDROCHLORIDE 5 MG: 10 TABLET, FILM COATED ORAL at 11:49

## 2019-05-31 RX ADMIN — LABETALOL 20 MG/4 ML (5 MG/ML) INTRAVENOUS SYRINGE 10 MG: at 23:06

## 2019-05-31 RX ADMIN — ARFORMOTEROL TARTRATE 15 MCG: 15 SOLUTION RESPIRATORY (INHALATION) at 10:31

## 2019-05-31 NOTE — PROGRESS NOTES
0730 Received report from off going RN at the bedside covering SBAR, lines drains drips and plan. Assessment shows patient is sleepy, family present. Family brought in food, teaching that we will provide food, to please ask if it is allowed. Family brought in a high sodium food product and explained/teaching to family that anything with salt will increase pressure and increase swelling. Patient and family were unaware. Family agrees to let us provide for the patient. 1300 to 1430 Patient tolerated MRI well. 1930 Report given to ellis Agarwal at the bedside inclusive of SBAR, lines drains drips and plan.

## 2019-05-31 NOTE — PROGRESS NOTES
Problem: Discharge Planning  Goal: *Discharge to safe environment  Outcome: Progressing Towards Goal  Plan is home with Home Health vs ARU- awaiting PT Consult    Reason for Admission:   CVA                  RRAT Score:   17               Do you (patient/family) have any concerns for transition/discharge?     none              Plan for utilizing home health: Will probably need home health PT/OT, PT still needs to see patient again    Current Advanced Directive/Advance Care Plan:  None, she does not want one at this time. Likelihood of readmission? Mod/yellow            Transition of Care Plan:      Home with Home health vs ARU      Patient has designated _daughter______________________ to participate in his/her discharge plan and to receive any needed information. Name: Lux Scott as pt  Phone number:[phone]      Patient has designated __son______________________ to participate in his/her discharge plan and to receive any needed information. Name: Lynne Paredes   Address:  Phone number:[phone]    Care Management Interventions  PCP Verified by CM: Yes(last seen \"a while\" ago)  Palliative Care Criteria Met (RRAT>21 & CHF Dx)?: No  Mode of Transport at Discharge: Other (see comment)(daughter or son to drive)  Transition of Care Consult (CM Consult): Discharge Planning  MyChart Signup: No  Discharge Durable Medical Equipment: No  Physical Therapy Consult: Yes  Occupational Therapy Consult: Yes  Speech Therapy Consult: Yes  Current Support Network: Other(lives with daughter in apartment)  Confirm Follow Up Transport: Family  Plan discussed with Pt/Family/Caregiver: Yes     Chart reviewed and pt verified demographics. She has [employer] and sees Dr Pelayo for PCP, not seen in [place]". She lives with her daughter and her daughter helps her with ADL if needed. She has a walker and shower chair at home. OT recommends Home Health, PT will need to write their recommendations.   Case Management to follow. Mahesh Graham.  Manoj Resendiz, BSN  [facility]  [phone], Pager [phone]

## 2019-05-31 NOTE — H&P
History and Physical    Patient: Saloni Barajas               Sex: female          DOA: 2019       YOB: 1970      Age:  44 y.o.        LOS:  LOS: 1 day        HPI:     Saloni Barajas is a 44 y.o. female who presented to the ER along with her daughter because she \"wasn't feeling right. \"  Overall she was weak and was having increased difficulty with mobilization. She also had confusion. She feels she thinks this has been going on for about 1 week. She does not have difficulty with speech or with facial droop. In the ER there is concern for CVA acutely, but she is not in the tPA window. She has Hypertensive Emergency and will be admitted to the ICU. Past Medical History:   Diagnosis Date    Anemia     Bone lesion     Chronic kidney disease     Depressed     Diabetes ([address]     Diabetes mellitus ([address]     Hypertension     Inverted nipple     RT nipple inverted - pt states that it has always been that way    Kidney problem     Pneumonia     Sarcoidosis     Thyroid disorder     Uterus problem        Social History:   Tobacco use:  Patient does not smoke   Alcohol use:  Patient occasionally drinks beer. Patient lives with her daughter. Family History: Mother  with CVA   Father's history unknown      Review of Systems    Constitutional:  No fever or weight loss  HEENT:  No headache or visual changes  Cardiovascular:  No chest pain or diaphoresis  Respiratory:  No coughing, wheezing, or shortness of breath. GI:  No nausea or vomitting.   No diarrhea  :  No hematuria or dysuria  Skin:  No rashes or moles  Neuro:  Weakness as above, no seizures or syncope  Hematological:  No bruising or bleeding  Endocrine:  No current diabetes or thyroid disease    Physical Exam:      Visit Vitals  BP (!) 203/122   Pulse 91   Temp 99.1 °F (37.3 °C)   Resp 28   Ht 5' 4\" (1.626 m)   Wt 146 kg (321 lb 14 oz)   SpO2 (!) 89%   BMI 55.25 kg/m²       Physical Exam:    Gen:  No distress, alert  HEENT:  Normal cephalic atraumatic, extra-occular movements are intact. Neck:  Supple, No JVD  Lungs:  Clear bilaterally, no wheeze, no rales, normal effort  Heart:  Regular Rate and Rhythm, normal S1 and S2, no edema  Abdomen:  Soft, non tender, normal bowel sounds, no guarding.   Extremities:  Well perfused, no cyanosis or edema  Neurological:  Awake and alert, CN's are intact, normal strength throughout extremities  Skin:  No rashes or moles  Mental Status:  Normal thought process, does not appear anxious    Laboratory Studies:    BMP:   Lab Results   Component Value Date/Time     05/30/2019 05:09 PM    K 3.7 05/30/2019 05:09 PM    CL 99 (L) 05/30/2019 05:09 PM    CO2 33 (H) 05/30/2019 05:09 PM    AGAP 7 05/30/2019 05:09 PM     (H) 05/30/2019 05:09 PM    BUN 14 05/30/2019 05:09 PM    CREA 1.63 (H) 05/30/2019 05:09 PM    GFRAA 40 (L) 05/30/2019 05:09 PM    GFRNA 33 (L) 05/30/2019 05:09 PM     CBC:   Lab Results   Component Value Date/Time    WBC 13.0 05/30/2019 05:09 PM    HGB 8.4 (L) 05/30/2019 05:09 PM    HCT 29.4 (L) 05/30/2019 05:09 PM     05/30/2019 05:09 PM       Assessment/Plan     Principal Problem:    CVA (cerebral vascular accident) ([facility] (5/30/2019)    Active Problems:    Hypertensive emergency (5/30/2019)      Cerebrovascular disease (5/30/2019)      Hyperlipidemia (5/30/2019)      HTN (hypertension) (5/31/2019)      COPD (chronic obstructive pulmonary disease) ([address] (5/30/2019)        PLAN:    Will proceed with MRI  BP goal is less than 470 systolic, proceeding with permissive HTN for now  Follow closely in ICU  ASA and Lipitor  Follow respiratory status  Neurology consult  PT eval and treat  DVT prophylaxis with SCD's

## 2019-05-31 NOTE — DIABETES MGMT
NUTRITIONAL ASSESSMENT GLYCEMIC CONTROL/ PLAN OF CARE     Jose Márquez           44 y.o.           5/30/2019                 1. Cerebrovascular accident (CVA), unspecified mechanism ([address]    2. Acute congestive heart failure, unspecified heart failure type ([address]    3. Hypertensive urgency    4. Elevated troponin    history of T2DM   INTERVENTIONS/PLAN:   1.  7156-6614 calorie diet to promote weight control (and 2 gram Na cardiac)  2. On-going low sodium education  3. Monitor po intake, labs and weights. ASSESSMENT:   Nutritional Status:  Pt is 268% ideal wt; Pt well nourished and po intake is adequate as indicated by meal time observation at breakfast.  Documented weights indicate pt has intentionally lost 19 lbs over past year (6% weight loss). Nutrition Diagnoses: Morbid obesity due to excess energy intake as evidenced by BMI of 55.2 kg/m2. Self monitoring deficit due to motivation as pt with food brought in from outside. Diabetes Management:   Pt reports she once took metformin for diabetes but it was discontinued after remote weight loss of 100 lbs. She still does not take any oral agents. Recent blood glucose:     Recent Glucose Results:   Lab Results   Component Value Date/Time    GLU 90 05/31/2019 02:00 AM     (H) 05/30/2019 05:09 PM     Within target range (non-ICU: <140; ICU<180): [x] Yes   []  No    Current Insulin regimen: none  Home medication/insulin regimen: none   HbA1c: 5.4% - ave BG hs been ~ 102 mg/dL over past 3 months  Adequate glycemic control PTA:  [x] Yes  [] No     SUBJECTIVE/OBJECTIVE:   Information obtained from: chart review, pt  Pt known from prior admission (2013) when she ws educated on 2 gram Na diet guidelines. Per nursing notes, pt's family brought in food from outside and was advised by nursing to only eat food provided by hospital.    5/31:  Pt reports her appetite is good. She states she has lost 30 lbs over past year.   She denies having food allergies and does not have issues with chewing or swallowing. Diet: cardiac    No data found. Medications: [x]                Reviewed   Most Recent POC Glucose:   Recent Labs     05/31/19  0200 05/30/19  1709   GLU 90 105*         Labs:   Lab Results   Component Value Date/Time    Hemoglobin A1c 5.4 05/30/2019 05:09 PM     Lab Results   Component Value Date/Time    Sodium 140 05/31/2019 02:00 AM    Potassium 3.3 (L) 05/31/2019 02:00 AM    Chloride 101 05/31/2019 02:00 AM    CO2 30 05/31/2019 02:00 AM    Anion gap 9 05/31/2019 02:00 AM    Glucose 90 05/31/2019 02:00 AM    BUN 13 05/31/2019 02:00 AM    Creatinine 1.55 (H) 05/31/2019 02:00 AM    Calcium 9.0 05/31/2019 02:00 AM    Magnesium 2.2 08/26/2017 03:31 PM    Phosphorus 4.6 01/09/2014 04:45 PM    Albumin 3.0 (L) 05/30/2019 05:09 PM       Anthropometrics: IBW : 54.4 kg (120 lb),  , BMI (calculated): 55.2  Wt Readings from Last 1 Encounters:   05/31/19 146 kg (321 lb 14 oz)      Ht Readings from Last 1 Encounters:   05/31/19 5' 4\" (1.626 m)     Last Weight Metrics:  Weight Loss Metrics 5/31/2019 11/24/2018 10/23/2018 10/3/2018 8/29/2018 8/1/2018 5/14/2018   Today's Wt 321 lb 14 oz 320 lb 320 lb 320 lb 323 lb 338 lb 340 lb   BMI 55.25 kg/m2 53.25 kg/m2 51.65 kg/m2 62.5 kg/m2 55.44 kg/m2 56.25 kg/m2 60.23 kg/m2     Estimated Nutrition Needs:  1776 Kcals/day, Protein (g): 82 g Fluid (ml): 1800 ml  Based on:   [x]          Actual BW    []          ABW   []            Adjusted BW         Nutrition Interventions:  Reinforce 2 gram Na diet  Adjust calories to 4315-2334 calories/d to promote weight control. Goal:   Blood glucose will be within target range of  mg/dL by 6/3/19. Weight maintenance (+/-1-2 kg) by 6/11/19 and slow gradual weight loss of 1-2 lbs/week post discharge.         Nutrition Monitoring and Evaluation      [x]     Monitor po intake on meal rounds  [x]     Continue inpatient monitoring and intervention  []     Other:      Nutrition Risk: []   High     []  Moderate    [x]  Minimal/Uncompromised    Walter Mcgovern RD, CDE   Office:  [facility] Pager:  [phone]

## 2019-05-31 NOTE — ED NOTES
Notified Dr. Db Fonseca via telephone with verbal readback that neurology recommended MRI to be completed on patient.  Dr. Db Fonseca stated he will order MRI routine and no need to complete it STAT at this time

## 2019-05-31 NOTE — PROGRESS NOTES
Problem: Falls - Risk of  Goal: *Absence of Falls  Description  Document Kory Alfaro Fall Risk and appropriate interventions in the flowsheet.   Outcome: Progressing Towards Goal

## 2019-05-31 NOTE — ROUTINE PROCESS
2220 - Received patient from ER via stretcher. Report given by ED RN Guanaco Cabral. Patient alert and oriented x 3 . Per Guanaco Cabral RN patient intermittently knows month and year with [facility]. Patient transferred from stretcher  Standing to bed with minimal assistance. Tolerated well. Patient has dyspnea with exertion. Patient states that she becomes easily fatigued and SOB with exertion chronically. Patient has lymphedema bilateral lower extremities- old scabbing noted lower extremities and abdomen. No open wounds noted. New gown, electrodes placed on patient, Mepilex to posterior- coccyx area. Head to toe assessment completed. BSC at bedside. Patient educated to not get out of bed without nurse present- patient verbalized good understanding. Patient oriented to room and call bell. Patient has 's per  - tele_neurologist stated that patient SBP not to be managed aggressively and patient was in parameters.

## 2019-05-31 NOTE — ED NOTES
Spoke with Dr. Cindi Landry, notified of BP, states no orders to be given at this time to treat with verbal readback.  Brianna Velasquez, ICU nurse notified of this at this time

## 2019-05-31 NOTE — PROGRESS NOTES
Problem: Discharge Planning  Goal: *Discharge to safe environment  Outcome: Progressing Towards Goal  Plan is home with Home Health vs ARU- awaiting PT Consult

## 2019-05-31 NOTE — ED NOTES
TRANSFER - ED to INPATIENT REPORT:    SBAR report made available to receiving floor on this patient being transferred to [address] [place] [address] ICU [phone])  for routine progression of care       Admitting diagnosis CVA (cerebral vascular accident) ([facility] [address] [I63.9]  Hypertensive emergency [I16.1]  Hyperlipidemia [E78.5]  COPD (chronic obstructive pulmonary disease) ([facility] [address] [J44.9]  Cerebrovascular disease [I67.9]    Information from the following report(s) SBAR was made available to receiving floor. Lines:   Peripheral IV 05/30/19 Right Antecubital (Active)   Site Assessment Clean, dry, & intact 5/30/2019  5:09 PM   Phlebitis Assessment 0 5/30/2019  5:09 PM   Infiltration Assessment 0 5/30/2019  5:09 PM   Dressing Status Clean, dry, & intact 5/30/2019  5:09 PM   Hub Color/Line Status Green 5/30/2019  5:09 PM        Medication list unable to confirm    Opportunity for questions and clarification was provided.       Patient is disoriented Last NIH 2220  Patient is  continent and ambulatory with assist     Valuables transported with patient     Patient transported with:   Monitor    MAP (Monitor): 129 =Monitored (most recent)  Vitals w/ MEWS Score (last day)     Date/Time MEWS Score Pulse Resp Temp BP Level of Consciousness SpO2    05/30/19 2215  1  90  19  98.6 °F (37 °C)  192/108  (Abnormal)   Alert  94 %    05/30/19 2200    87  24    189/113  (Abnormal)     94 %    05/30/19 2145    87  27    187/108  (Abnormal)     91 %    05/30/19 2130    89  22    192/111  (Abnormal)     92 %    05/30/19 2115    86  27    195/107  (Abnormal)     93 %    05/30/19 2100    89  29    190/108  (Abnormal)     94 %    05/30/19 2045    88  28    193/103  (Abnormal)     94 %    05/30/19 2030    88  31  (Abnormal)     193/98  (Abnormal)     93 %    05/30/19 2015    88  34  (Abnormal)     191/109  (Abnormal)     94 %    05/30/19 2000    88  32  (Abnormal)     190/110  (Abnormal)     94 %    05/30/19 1945    90  35  (Abnormal)    200/113  (Abnormal)     95 %    05/30/19 1930    89  28    203/119  (Abnormal)     95 %    05/30/19 1900    89  29    205/113  (Abnormal)     94 %    05/30/19 1830          219/138  (Abnormal)         05/30/19 1800    99      218/130  (Abnormal)     96 %    05/30/19 1754    102  (Abnormal)               05/30/19 1751          197/127  (Abnormal)         05/30/19 1730    101  (Abnormal)   25    198/113  (Abnormal)     97 %    05/30/19 1715    101  (Abnormal)   19    218/128  (Abnormal)     96 %    05/30/19 16:27:32              96 %    05/30/19 1622    109  (Abnormal)   26        95 %    05/30/19 1621          206/120  (Abnormal)         05/30/19 1609  4  106  (Abnormal)   17  98.9 °F (37.2 °C)  233/154  (Abnormal)   Alert  92 %

## 2019-05-31 NOTE — PROGRESS NOTES
Problem: Self Care Deficits Care Plan (Adult)  Goal: *Acute Goals and Plan of Care (Insert Text)  Description  Occupational Therapy Goals  Initiated 5/31/2019 within 7 day(s). 1.  Patient will perform LB bathing/dressing w/ AE PRN w/ supervision & good activity pacing to promote independence w/ self-care tasks. 2.  Patient will perform grooming while standing for 8 minutes w/ Mod I and 2 rest breaks to promote dynamic standing tolerance. 3.  Patient will perform toilet transfers with Mod I using LRAD to promote functional independence. 4.  Patient will perform all aspects of toileting with Mod I & AE PRN to promote functional independence. 5.  Patient will utilize energy conservation techniques during functional activities with minimal cues. Outcome: Progressing Towards Goal   OCCUPATIONAL THERAPY EVALUATION    Patient: Lefty Coats (24 y.o. female)  Date: 5/31/2019  Primary Diagnosis: CVA (cerebral vascular accident) [facility]) [I63.9]  Hypertensive emergency [I16.1]  Hyperlipidemia [E78.5]  COPD (chronic obstructive pulmonary disease) ([facility] [address] [J44.9]  Cerebrovascular disease [I67.9]        Precautions: Fall    PLOF: Pt reports being independent with ADLs. ASSESSMENT :  Based on the objective data described below, the patient presents with decreased ADL participation secondary to CVA. Upon entering room pt supine in bed, visitors present; pt agreeable to OT eval; BRET Matthew approved OT session; per MD orders pt w/ permissive HTN. Pt's BP supine in bed 174/100. Pt performed bed mobility w/ SBA/CGA in prep for functional transfers. Pt's BP at EOB: 194/100. Pt performed UB bathing & oral care w/ set up while seated EOB w/ good static sitting balance; changed gown w/ Min(A). Pt performed LB bathing w/ CGA & fair sitting balance at EOB. Pt performed catarino hygiene while standing w/ CGA & fair balance for hygiene & Mod(A)x2 for sit to stand w/ RW for stability. Pt c/o 7/10 pain in bilateral knees upon standing.  Pt performed sit to supine w/ CGA & BP supine 183/109. Pt left recumbent in bed, needs within reach & RN Vipul notified of pt's status & pain. Patient will benefit from skilled intervention to address the above impairments. Patient's rehabilitation potential is considered to be Fair  Factors which may influence rehabilitation potential include:   ? None noted  ? Mental ability/status  ? Medical condition  ? Home/family situation and support systems  ? Safety awareness  ? Pain tolerance/management  ? Other:      PLAN :  Recommendations and Planned Interventions:   ?               Self Care Training                  ? Therapeutic Activities  ? Functional Mobility Training   ? Cognitive Retraining  ? Therapeutic Exercises           ? Endurance Activities  ? Balance Training                    ? Neuromuscular Re-Education  ? Visual/Perceptual Training     ? Home Safety Training  ? Patient Education                   ? Family Training/Education  ? Other (comment):    Frequency/Duration: Patient will be followed by occupational therapy 3-5 times a week to address goals. Discharge Recommendations: Home Health  Further Equipment Recommendations for Discharge: anticipate sock aid & reacher. SUBJECTIVE:   Patient stated my knees just ache.     OBJECTIVE DATA SUMMARY:     Past Medical History:   Diagnosis Date    Anemia     Bone lesion     Chronic kidney disease     Depressed     Diabetes ([address]     Diabetes mellitus ([address]     Hypertension     Inverted nipple     RT nipple inverted - pt states that it has always been that way    Kidney problem     Pneumonia     Sarcoidosis     Thyroid disorder     Uterus problem      Past Surgical History:   Procedure Laterality Date    HX  SECTION      HX OTHER SURGICAL      neck biopsy    HX TUBAL LIGATION       Barriers to Learning/Limitations: None  Compensate with: visual, verbal, tactile, kinesthetic cues/model    Home Situation:   Home Situation  Home Environment: Private residence  # Steps to Enter: 10  One/Two Story Residence: Two story  Living Alone: No  Support Systems: Child(jamie)  Patient Expects to be Discharged to[de-identified] Apartment  Current DME Used/Available at Home: Grab bars, Walker, rolling  Tub or Shower Type: Tub/Shower combination(grab bars)  ? Right hand dominant   ? Left hand dominant    Cognitive/Behavioral Status:  Neurologic State: Alert  Orientation Level: Oriented to place;Oriented to situation;Oriented to person  Cognition: Follows commands  Safety/Judgement: Awareness of environment    Skin: Intact. Edema: Bilateral LEs    Coordination: BUE  Coordination: Within functional limits       Balance:  Sitting: Impaired  Sitting - Static: Good (unsupported)  Sitting - Dynamic: Fair (occasional)  Standing: Impaired  Standing - Static: Fair  Standing - Dynamic : Fair    Strength: BUE  Strength: Within functional limits(4-/5)     Range of Motion: BUE  AROM: Within functional limits     Functional Mobility and Transfers for ADLs:  Bed Mobility:  Rolling: Stand-by assistance  Supine to Sit: Contact guard assistance  Sit to Supine: Contact guard assistance  Scooting: Contact guard assistance  Transfers:  Sit to Stand: Moderate assistance;Assist x2  Stand to Sit: Minimum assistance     ADL Assessment:   Feeding: Modified independent  Oral Facial Hygiene/Grooming: Setup  Bathing: Minimum assistance  Upper Body Dressing: Setup  Lower Body Dressing: Moderate assistance  Toileting: Minimum assistance     ADL Intervention:   Pt performed UB bathing & oral care w/ set up while seated EOB w/ good static sitting balance; changed gown w/ Min(A). Pt performed LB bathing w/ CGA & fair sitting balance at EOB. Pt performed catarino hygiene while standing w/ CGA.      Upper Body Bathing  Bathing Assistance: Set-up  Position Performed: Seated edge of bed    Lower Body Bathing  Bathing Assistance: Contact guard assistance  Perineal  : Contact guard assistance  Position Performed: Standing    Upper [address]: Minimum  assistance    Cognitive Retraining  Safety/Judgement: Awareness of environment    Therapeutic Activity:  Pt performed bed mobility w/ SBA/CGA in prep for functional transfers, sit to stand from EOB w/ Mod(A)x2 & RW for stability. Pt performed catarino hygiene while standing w/ fair balance. Pain:  Pain level pre-treatment: 7/10  Bilateral knee pain  Pain level post-treatment: 7/10   Pain Intervention(s): Repositioning  Response to intervention: Nurse Vipul notified    Activity Tolerance:   Fair  Please refer to the flowsheet for vital signs taken during this treatment. After treatment:   ? Patient left in no apparent distress sitting up in chair  ? Patient left in no apparent distress in bed  ? Call bell left within reach  ? Nursing Vipul notified  ? Caregiver present  ? Bed alarm activated    COMMUNICATION/EDUCATION:   ? Role of Occupational Therapy in the acute care setting  ? Home safety education was provided and the patient/caregiver indicated understanding. ? Patient/family have participated as able in goal setting and plan of care. ? Patient/family agree to work toward stated goals and plan of care. ? Patient understands intent and goals of therapy, but is neutral about his/her participation. ? Patient is unable to participate in goal setting and plan of care. Thank you for this referral.  Wil Solomon  Time Calculation: 34 mins    Eval Complexity: History: MEDIUM Complexity : Expanded review of history including physical, cognitive and psychosocial  history ;    Examination: MEDIUM Complexity : 3-5 performance deficits relating to physical, cognitive , or psychosocial skils that result in activity limitations and / or participation restrictions; Decision Making:MEDIUM Complexity : Patient may present with comorbidities that affect occupational performnce.  Miniml to moderate modification of tasks or assistance (eg, physical or verbal ) with assesment(s) is necessary to enable patient to complete evaluation

## 2019-05-31 NOTE — PROGRESS NOTES
Internal Medicine Progress Note        NAME: Antwan Goode   :  1970  MRM:  [id number]    Date/Time: 2019        ASSESSMENT/PLAN:    # Acute delirious state ,  Admitted with possible   CVA (cerebral vascular accident) ([facility] [address] (2019). MRI pending. Neuro-observation. Permissive Hypertension. Further work up pending results and progress. PT/OT/SLP/CM  - ASA , Statin   - Acute delirium possibly due to metabolic encephalopathy from uncontrolled HTN . Now back to normal mental state. - Per daughter , she has old CVA 1 and some sequelae from it with leg weakness, some mental disturbances and speech defects at times. #   COPD (chronic obstructive pulmonary disease) ([facility] [address] (2019). Stable    #   Hyperlipidemia (2019). Statin     #  Hypertensive emergency (2019). Will lower it safely     #  HTN (hypertension) (2019). Normally not well controlled per patient     # chronic Anemia. # Mild hypokalemia    # Morbid  Obesity with legs elephantiasis  - Body mass index is 55.25 kg/m².      -Code status : FULL    Lab Review:     Recent Labs     19  0200 19  1709   WBC 9.4 13.0   HGB 8.0* 8.4*   HCT 27.8* 29.4*    292     Recent Labs     19  0200 19  1709    139   K 3.3* 3.7    99*   CO2 30 33*   GLU 90 105*   BUN 13 14   CREA 1.55* 1.63*   CA 9.0 9.2   ALB  --  3.0*   TBILI  --  1.0   SGOT  --  24   ALT  --  13     Lab Results   Component Value Date/Time    Glucose (POC) 121 (H) 10/08/2018 09:33 PM    Glucose (POC) 109 10/08/2018 11:26 AM    Glucose (POC) 90 10/08/2018 07:31 AM    Glucose (POC) 101 10/07/2018 09:59 PM    Glucose (POC) 105 10/07/2018 04:01 PM     No results for input(s): PH, PCO2, PO2, HCO3, FIO2 in the last 72 hours. No results for input(s): INR in the last 72 hours.     No lab exists for component: INREXT    No results found for: SDES  Lab Results   Component Value Date/Time    Culture result:  10/10/2018 03:59 PM     MRSA target DNA is not detected (presumptive not colonized with MRSA)    Culture result: NO GROWTH 6 DAYS 10/03/2018 12:40 PM    Culture result: NO GROWTH 6 DAYS 10/03/2018 12:23 PM       All Cardiac Markers in the last 24 hours:   Lab Results   Component Value Date/Time     05/30/2019 05:09 PM    CKMB <1.0 05/30/2019 05:09 PM    CKND1  05/30/2019 05:09 PM     CALCULATION NOT PERFORMED WHEN RESULT IS BELOW LINEAR LIMIT    TROIQ 0.05 (H) 05/30/2019 05:09 PM              Subjective:     Chief Complaint:      Feel better. ROS:  (bold if positive,otherwise negative)    Fever/chills ,  Dysuria   Cough , Sputum , SOB/BIRD , Chest Pain     Diarrhea ,Nausea/Vomit , Abd Pain , Constipation   Tolerating PT , Tolerating Diet                Objective:     Vitals:  Last 24hrs VS reviewed since prior progress note. Most recent are:    Visit Vitals  BP (!) 193/130   Pulse 71   Temp 97.8 °F (36.6 °C)   Resp 24   Ht 5' 4\" (1.626 m)   Wt 146 kg (321 lb 14 oz)   SpO2 98%   BMI 55.25 kg/m²     SpO2 Readings from Last 6 Encounters:   05/31/19 98%   11/24/18 100%   10/23/18 98%   10/10/18 94%   08/01/18 96%   05/14/18 99%    O2 Flow Rate (L/min): 2 l/min       Intake/Output Summary (Last 24 hours) at 5/31/2019 1527  Last data filed at 5/31/2019 0800  Gross per 24 hour   Intake 420 ml   Output 900 ml   Net -480 ml          Physical Exam:   Gen: Well-developed, well-nourished, in no acute distress  HEENT: Head atraumatic, normocephalic , Pink conjunctivae,  hearing intact to voice, moist mucous membranes  Neck: No apparent JVD, Supple, without masses, thyroid non-tender  Resp: No accessory muscle use, Bilateral BS present,clear breath sounds without wheezes rales or rhonchi  Card: No murmurs, normal S1, S2 without thrills, bruits or Gallop.  significant lower leg peripheral edema, elephantiasis both legs.    Abd:  Soft, non-tender, not distended, normoactive bowel sounds are present   Musc: No cyanosis or clubbing  Skin: No rashes or ulcers, skin turgor is good   Neuro:  Cranial nerves are grossly intact, no clear area of focal motor weakness, follows commands appropriately  Psych:    oriented to person, place and time, alert. Telemetry reviewed:   normal sinus rhythm    Medications Reviewed: (see below)    Lab Data Reviewed: (see below)    ______________________________________________________________________    Medications:     Current Facility-Administered Medications   Medication Dose Route Frequency    acetaminophen (TYLENOL) tablet 650 mg  650 mg Oral Q4H PRN    amLODIPine (NORVASC) tablet 10 mg  10 mg Oral DAILY    cyclobenzaprine (FLEXERIL) tablet 5 mg  5 mg Oral TID PRN    labetalol (NORMODYNE) tablet 300 mg  300 mg Oral BID    venlafaxine-SR (EFFEXOR-XR) capsule 150 mg  150 mg Oral DAILY    sodium chloride (NS) flush 5-40 mL  5-40 mL IntraVENous Q8H    sodium chloride (NS) flush 5-40 mL  5-40 mL IntraVENous PRN    aspirin tablet 325 mg  325 mg Oral DAILY    atorvastatin (LIPITOR) tablet 80 mg  80 mg Oral DAILY    arformoterol (BROVANA) neb solution 15 mcg  15 mcg Nebulization BID RT    budesonide (PULMICORT) 500 mcg/2 ml nebulizer suspension  500 mcg Nebulization BID RT    acyclovir (ZOVIRAX) capsule 400 mg  400 mg Oral DAILY          Total time spent with patient: 35 minutes                  Care Plan discussed with: Patient, Family, Nursing Staff and >50% of time spent in counseling and coordination of care  I called neurology office.     Discussed:  Care Plan       Disposition:  Home w/Family             Attending Physician: Saleem Roman MD

## 2019-05-31 NOTE — PROGRESS NOTES
Problem: Dysphagia (Adult)  Goal: *Acute Goals and Plan of Care (Insert Text)  Description  Recommendations:  Diet: regular, thin  Meds: per pt preference  Aspiration Precautions  Oral Care TID      Goals:  Patient will:  1. Tolerate PO trials with 0 s/s overt distress in 4/5 trials  2. Utilize compensatory swallow strategies/maneuvers (decrease bite/sip, size/rate, alt. liq/sol) with min cues in 4/5 trials       Outcome: Progressing Towards Goal      SPEECH LANGUAGE PATHOLOGY BEDSIDE SWALLOW   EVALUATION & TREATMENT     Patient: Carole Tesfaye (80 y.o. female)  Date: 5/31/2019  Primary Diagnosis: CVA (cerebral vascular accident) ([facility] [address] [I63.9]  Hypertensive emergency [I16.1]  Hyperlipidemia [E78.5]  [facility]D [facility]chronic [facility] disease) [facility] [J44.9]  Cerebrovascular disease [I67.9]        Precautions: aspiration     PLOF: independent    ASSESSMENT :  Based on the objective data described below, the patient presents with normal swallowing ability. Clinical beside swallow eval completed per MD orders. Pt A&Ox3, reoriented to year. Functional communication. Intelligibility > 90%. OM examination revealed oral motor structures functional for mastication and deglutition. Presented with thin liquid, soft and solid trials from breakfast tray. Exhibited (+) bolus cohesion, manipulation and A-P transit. Further exhibited (+) swallow timing/reflex and functional hyolaryngeal excursion. Pt able to manipulate and clear with 0 clinical s/s aspiration and/or oropharyngeal dysphagia. Pt safe for regular solid, thin liquid diet. TREATMENT :  Skilled therapy initiated; Educated pt on aspiration precautions and importance of compensatory swallow techniques to decrease aspiration risk (decrease rate of intake & sip/bite size, upright @HOB for all po intake and ~30 minutes after po); verbalized comprehension.  ST will follow up x1-2 visits to ensure diet tolerance and education    Patient will benefit from skilled intervention to address the above impairments. Patient's rehabilitation potential is considered to be Good  Factors which may influence rehabilitation potential include:   ? None noted  ? Mental ability/status  ? Medical condition  ? Home/family situation and support systems  ? Safety awareness  ? Pain tolerance/management  ? Other:      PLAN :  Recommendations and Planned Interventions:  Regular, thin  Frequency/Duration: Patient will be followed by speech-language pathology 1-2 times per day/4-7 days per week to address goals. Discharge Recommendations: None     SUBJECTIVE:   Patient stated ? Im okay? .    OBJECTIVE:     Past Medical History:   Diagnosis Date    Anemia     Bone lesion     Chronic kidney disease     Depressed     Diabetes ([facility] [address]     Diabetes mellitus ([facility] [address]     Hypertension     Inverted nipple     RT nipple inverted - pt states that it has always been that way    Kidney problem     Pneumonia     Sarcoidosis     Thyroid disorder     Uterus problem      Past Surgical History:   Procedure Laterality Date    HX  SECTION      HX OTHER SURGICAL      neck biopsy    HX TUBAL LIGATION       Home Situation:   Home Situation  Home Environment: Apartment  One/Two Story Residence: Two story  Living Alone: No  Support Systems: Family member(s)  Patient Expects to be Discharged to[de-identified] Apartment  Current DME Used/Available at Home: None    Diet prior to admission: regular  Current Diet:  regular     Cognitive and Communication Status:  Neurologic State: Alert  Orientation Level: Oriented to person, Oriented to place, Oriented to situation  Cognition: Follows commands  Perception: Appears intact  Perseveration: No perseveration noted  Safety/Judgement: Awareness of environment  Oral Assessment:  Oral Assessment  Labial: No impairment  Dentition: Intact  Oral Hygiene: good  Lingual: No impairment  Velum: No impairment  Mandible: No impairment  P.O. Trials:  Patient Position: HOB60  Vocal quality prior to P.O.: No impairment  Consistency Presented: Thin liquid;Mechanical soft; Solid  How Presented: Self-fed/presented;Straw;Successive swallows  How Much: 18  Bolus Acceptance: No impairment  Bolus Formation/Control: No impairment     Propulsion: No impairment  Oral Residue: None  Initiation of Swallow: No impairment  Laryngeal Elevation: Functional  Aspiration Signs/Symptoms: None  Pharyngeal Phase Characteristics: No impairment, issues, or problems   Effective Modifications: None  Cues for Modifications: None       Oral Phase Severity: No impairment  Pharyngeal Phase Severity : No impairment    PAIN:  Pain level pre-treatment: 0/10   Pain level post-treatment: 0/10   Pain Intervention(s): Medication (see MAR); Rest, Ice, Repositioning   Response to intervention: Nurse notified, See doc flow    After treatment:   ?            Patient left in no apparent distress sitting up in chair  ? Patient left in no apparent distress in bed  ? Call bell left within reach  ? Nursing notified  ? Family present  ? Caregiver present  ? Bed alarm activated    COMMUNICATION/EDUCATION:   ?            Aspiration precautions; swallow safety; compensatory techniques. ?            Patient/family have participated as able in goal setting and plan of care. ?            Patient/family agree to work toward stated goals and plan of care. ?            Patient understands intent and goals of therapy; neutral about participation. ? Patient unable to participate in goal setting/plan of care; educ ongoing with interdisciplinary staff  ? Posted safety precautions in patient's room.     Thank you for this referral.  Candi Doran  Time Calculation: 20 mins  Evaluation Time: 10 minutes   Treatment Time: 10 minutes

## 2019-05-31 NOTE — PROGRESS NOTES
PT orders received and chart reviewed. 18: 1st PT attempt. /109. Functional mobility contraindicated d/t elevated diastolic BP . Will follow up. 1015: 2nd PT attempt. /107. Functional mobility contraindicated d/t elevated diastolic BP. Will continue to follow.      Thank you,     Lilian Reyna, PT, DPT  Office Extension: 1133

## 2019-06-01 PROBLEM — I67.4 ENCEPHALOPATHY, HYPERTENSIVE: Status: ACTIVE | Noted: 2019-06-01

## 2019-06-01 LAB
ANION GAP SERPL CALC-SCNC: 7 MMOL/L (ref 3–18)
BASOPHILS # BLD: 0 K/UL (ref 0–0.1)
BASOPHILS NFR BLD: 0 % (ref 0–2)
BUN SERPL-MCNC: 17 MG/DL (ref 7–18)
BUN/CREAT SERPL: 10 (ref 12–20)
CALCIUM SERPL-MCNC: 9.1 MG/DL (ref 8.5–10.1)
CHLORIDE SERPL-SCNC: 104 MMOL/L (ref 100–108)
CO2 SERPL-SCNC: 32 MMOL/L (ref 21–32)
CREAT SERPL-MCNC: 1.77 MG/DL (ref 0.6–1.3)
DIFFERENTIAL METHOD BLD: ABNORMAL
EOSINOPHIL # BLD: 0.2 K/UL (ref 0–0.4)
EOSINOPHIL NFR BLD: 4 % (ref 0–5)
ERYTHROCYTE [DISTWIDTH] IN BLOOD BY AUTOMATED COUNT: 18.2 % (ref 11.6–14.5)
GLUCOSE SERPL-MCNC: 99 MG/DL (ref 74–99)
HCT VFR BLD AUTO: 27.5 % (ref 35–45)
HGB BLD-MCNC: 8 G/DL (ref 12–16)
LYMPHOCYTES # BLD: 0.8 K/UL (ref 0.9–3.6)
LYMPHOCYTES NFR BLD: 14 % (ref 21–52)
MAGNESIUM SERPL-MCNC: 2.2 MG/DL (ref 1.6–2.6)
MCH RBC QN AUTO: 19.3 PG (ref 24–34)
MCHC RBC AUTO-ENTMCNC: 29.1 G/DL (ref 31–37)
MCV RBC AUTO: 66.3 FL (ref 74–97)
MONOCYTES # BLD: 0.6 K/UL (ref 0.05–1.2)
MONOCYTES NFR BLD: 11 % (ref 3–10)
NEUTS SEG # BLD: 4 K/UL (ref 1.8–8)
NEUTS SEG NFR BLD: 71 % (ref 40–73)
PHOSPHATE SERPL-MCNC: 4.7 MG/DL (ref 2.5–4.9)
PLATELET # BLD AUTO: 254 K/UL (ref 135–420)
POTASSIUM SERPL-SCNC: 3.6 MMOL/L (ref 3.5–5.5)
RBC # BLD AUTO: 4.15 M/UL (ref 4.2–5.3)
SODIUM SERPL-SCNC: 143 MMOL/L (ref 136–145)
WBC # BLD AUTO: 5.6 K/UL (ref 4.6–13.2)

## 2019-06-01 PROCEDURE — 74011250637 HC RX REV CODE- 250/637: Performed by: HOSPITALIST

## 2019-06-01 PROCEDURE — 80048 BASIC METABOLIC PNL TOTAL CA: CPT

## 2019-06-01 PROCEDURE — 97535 SELF CARE MNGMENT TRAINING: CPT

## 2019-06-01 PROCEDURE — 74011250637 HC RX REV CODE- 250/637: Performed by: INTERNAL MEDICINE

## 2019-06-01 PROCEDURE — 74011000250 HC RX REV CODE- 250: Performed by: HOSPITALIST

## 2019-06-01 PROCEDURE — 84100 ASSAY OF PHOSPHORUS: CPT

## 2019-06-01 PROCEDURE — 94640 AIRWAY INHALATION TREATMENT: CPT

## 2019-06-01 PROCEDURE — 77010033678 HC OXYGEN DAILY

## 2019-06-01 PROCEDURE — 97161 PT EVAL LOW COMPLEX 20 MIN: CPT

## 2019-06-01 PROCEDURE — 83735 ASSAY OF MAGNESIUM: CPT

## 2019-06-01 PROCEDURE — 74011250636 HC RX REV CODE- 250/636: Performed by: INTERNAL MEDICINE

## 2019-06-01 PROCEDURE — 97530 THERAPEUTIC ACTIVITIES: CPT

## 2019-06-01 PROCEDURE — 85025 COMPLETE CBC W/AUTO DIFF WBC: CPT

## 2019-06-01 PROCEDURE — 94761 N-INVAS EAR/PLS OXIMETRY MLT: CPT

## 2019-06-01 PROCEDURE — 36415 COLL VENOUS BLD VENIPUNCTURE: CPT

## 2019-06-01 PROCEDURE — 65660000000 HC RM CCU STEPDOWN

## 2019-06-01 RX ORDER — CLONIDINE HYDROCHLORIDE 0.1 MG/1
0.1 TABLET ORAL DAILY
Status: DISCONTINUED | OUTPATIENT
Start: 2019-06-01 | End: 2019-06-01

## 2019-06-01 RX ORDER — HYDRALAZINE HYDROCHLORIDE 50 MG/1
25 TABLET, FILM COATED ORAL 4 TIMES DAILY
Status: DISCONTINUED | OUTPATIENT
Start: 2019-06-01 | End: 2019-06-01

## 2019-06-01 RX ADMIN — ACYCLOVIR 400 MG: 200 CAPSULE ORAL at 09:21

## 2019-06-01 RX ADMIN — Medication 10 ML: at 05:16

## 2019-06-01 RX ADMIN — ARFORMOTEROL TARTRATE 15 MCG: 15 SOLUTION RESPIRATORY (INHALATION) at 08:09

## 2019-06-01 RX ADMIN — ARFORMOTEROL TARTRATE 15 MCG: 15 SOLUTION RESPIRATORY (INHALATION) at 19:42

## 2019-06-01 RX ADMIN — BUDESONIDE 500 MCG: 0.5 INHALANT RESPIRATORY (INHALATION) at 19:42

## 2019-06-01 RX ADMIN — LABETALOL HYDROCHLORIDE 300 MG: 100 TABLET, FILM COATED ORAL at 09:21

## 2019-06-01 RX ADMIN — CLONIDINE HYDROCHLORIDE 0.1 MG: 0.1 TABLET ORAL at 11:35

## 2019-06-01 RX ADMIN — BUDESONIDE 500 MCG: 0.5 INHALANT RESPIRATORY (INHALATION) at 08:09

## 2019-06-01 RX ADMIN — LABETALOL 20 MG/4 ML (5 MG/ML) INTRAVENOUS SYRINGE 10 MG: at 03:02

## 2019-06-01 RX ADMIN — LABETALOL HYDROCHLORIDE 300 MG: 100 TABLET, FILM COATED ORAL at 18:09

## 2019-06-01 RX ADMIN — VENLAFAXINE HYDROCHLORIDE 150 MG: 150 CAPSULE, EXTENDED RELEASE ORAL at 09:21

## 2019-06-01 RX ADMIN — ASPIRIN 325 MG ORAL TABLET 325 MG: 325 PILL ORAL at 09:21

## 2019-06-01 RX ADMIN — AMLODIPINE BESYLATE 10 MG: 10 TABLET ORAL at 09:21

## 2019-06-01 RX ADMIN — ATORVASTATIN CALCIUM 80 MG: 40 TABLET, FILM COATED ORAL at 09:21

## 2019-06-01 RX ADMIN — Medication 10 ML: at 14:00

## 2019-06-01 NOTE — PROGRESS NOTES
Problem: Falls - Risk of  Goal: *Absence of Falls  Description  Document Charline Suggs Fall Risk and appropriate interventions in the flowsheet. Outcome: Progressing Towards Goal     Problem: Pain  Goal: *Control of Pain  Outcome: Progressing Towards Goal  Goal: *PALLIATIVE CARE:  Alleviation of Pain  Outcome: Progressing Towards Goal     Problem: Patient Education: Go to Patient Education Activity  Goal: Patient/Family Education  Outcome: Progressing Towards Goal     Problem: Patient Education: Go to Patient Education Activity  Goal: Patient/Family Education  Outcome: Progressing Towards Goal     Problem: Discharge Planning  Goal: *Discharge to safe environment  Outcome: Progressing Towards Goal     Problem: Pressure Injury - Risk of  Goal: *Prevention of pressure injury  Description  Document Aureliano Scale and appropriate interventions in the flowsheet.   Outcome: Progressing Towards Goal     Problem: Hypertension  Goal: *Blood pressure within specified parameters  Outcome: Progressing Towards Goal  Goal: *Fluid volume balance  Outcome: Progressing Towards Goal  Goal: *Labs within defined limits  Outcome: Progressing Towards Goal

## 2019-06-01 NOTE — PROGRESS NOTES
Problem: Self Care Deficits Care Plan (Adult)  Goal: *Acute Goals and Plan of Care (Insert Text)  Description  Occupational Therapy Goals  Initiated 5/31/2019 within 7 day(s). 1.  Patient will perform LB bathing/dressing w/ AE PRN w/ supervision & good activity pacing to promote independence w/ self-care tasks. 2.  Patient will perform grooming while standing for 8 minutes w/ Mod I and 2 rest breaks to promote dynamic standing tolerance. 3.  Patient will perform toilet transfers with Mod I using LRAD to promote functional independence. 4.  Patient will perform all aspects of toileting with Mod I & AE PRN to promote functional independence. 5.  Patient will utilize energy conservation techniques during functional activities with minimal cues. Outcome: Progressing Towards Goal   OCCUPATIONAL THERAPY TREATMENT    Patient: Iker Bundy (52 y.o. female)  Date: 6/1/2019  Diagnosis: [facility] [I63.9]  Hypertensive emergency [I16.1]  Hyperlipidemia [E78.5]  COPD (chronic obstructive pulmonary disease) ([facility] [J44.9]  Cerebrovascular disease [I67.9] [facility]       Precautions:    PLOF: Independent    Chart, occupational therapy assessment, plan of care, and goals were reviewed. ASSESSMENT:  Pt in bathroom, seated on toilet upon entry. Reinforced importance of calling for assistance. Decrease dynamic standing balance and body habitus requires Min Assist for bowel hygiene for thoroughness and assist w/clothing mgt. Pt tolerates standing sinkside performing hand hygiene. Hand held assist and vc's for safety/pacing w/functional mobility/transfer to chair. Pt educated on importance of healthy lifestyle ie good diet, exercise and medication mgt for stroke prevention. Progression toward goals:  ?          Improving appropriately and progressing toward goals  ? Improving slowly and progressing toward goals  ?           Not making progress toward goals and plan of care will be adjusted     PLAN:  Patient continues to benefit from skilled intervention to address the above impairments. Continue treatment per established plan of care. Discharge Recommendations:  Home Health for safety check  Further Equipment Recommendations for Discharge:  N/A, pt reports she has DME     SUBJECTIVE:   Patient stated ? I'll tell you exactly what happened. The doctor changed my medicine, but I didn't go back. ?    OBJECTIVE DATA SUMMARY:   Cognitive/Behavioral Status:  Neurologic State: Alert  Orientation Level: Oriented X4  Cognition: Follows commands  Safety/Judgement: Fall prevention    Functional Mobility and Transfers for ADLs:   Bed Mobility:  Rolling: Supervision  Supine to Sit: Contact guard assistance  Sit to Supine: Minimum assistance(Min A w/ mobility of JAJA LEs from dependent position to bed)  Scooting: Contact guard assistance   Transfers:  Sit to Stand: Minimum assistance  Stand to Sit: Contact guard assistance   Toilet Transfer : Contact guard assistance(w/grab bar)   Bathroom Mobility: Minimum assistance;Contact guard assistance(w/HHA)  Balance:  Sitting: Intact  Sitting - Static: Good (unsupported)  Sitting - Dynamic: Fair (occasional)  Standing: Impaired; With support  Standing - Static: Fair  Standing - Dynamic : Fair    ADL Intervention:  Grooming  Washing Hands: Contact guard assistance(standing sinkside)    Toileting  Toileting Assistance: Maximum assistance  Bladder Hygiene: Contact guard assistance(standing)  Bowel Hygiene: Minimum assistance  Clothing Management: Maximum assistance    Cognitive Retraining  Safety/Judgement: Fall prevention    Pain:  Pain level pre-treatment: 0/10   Pain level post-treatment: 0/10    Activity Tolerance:    Good    Please refer to the flowsheet for vital signs taken during this treatment. After treatment:   ?  Patient left in no apparent distress sitting up in chair  ? Patient left in no apparent distress in bed  ?   Call Connie Cleveland left within reach  ? Nursing notified  ?  family present  ? Bed alarm activated    COMMUNICATION/EDUCATION:   ? Role of Occupational Therapy in the acute care setting  ? Home safety education was provided and the patient/caregiver indicated understanding. ? Patient/family have participated as able in working towards goals and plan of care. ? Patient/family agree to work toward stated goals and plan of care. ? Patient understands intent and goals of therapy, but is neutral about his/her participation. ? Patient is unable to participate in goal setting and plan of care.       Thank you for this referral.  GISEL Victoria  Time Calculation: 30 mins

## 2019-06-01 NOTE — CONSULTS
Neurology Consult  Note    Patient ID:  Jenni Vickers  [id number]  44 y.o.  1970    Subjective: Altered Mental status     HPI:      Jenni Vickers is a 44 y.o. female with a PMH of morbid obesity, diabetes, hypertension, obesity, renal insufficiency and CVA, lymphedema, sarcoidosis, hyperthyroidism who presents with fatigue, \"not been feeling well\" for about a week. Yesterday she was somewhat confused. Patient lives with her daughter who confirmed her AMS x 1 day. Daughter states that the patient has some memory loss since her CVA 5 years ago but was extremely confused yesterday. She attempted to put on her grandsons clothes when getting dressed and couldn't figure out how to put her seatbelt on in the car. She does not have focal weakness or numbness. No visual loss or trouble with speech. No seizure noted. Patient states her blood pressure was not well controlled lately because her BP medication was changed by her internist about 1 month ago. She has Hypertensive Emergency and is admitted to the ICU.            Past Medical History:   Diagnosis Date    Anemia      Bone lesion      Chronic kidney disease      Depressed      Diabetes ([address]      Diabetes mellitus ([address]      Hypertension      Inverted nipple       RT nipple inverted - pt states that it has always been that way    Kidney problem      Pneumonia      Sarcoidosis      Thyroid disorder      Uterus problem           Social History:              Tobacco use:  Patient does not smoke              Alcohol use:  Patient occasionally drinks beer. Patient lives with her daughter.     Family History: Mother  with CVA              Father's history unknown        Review of Systems     Constitutional:  No fever or weight loss  HEENT:  No headache or visual changes  Cardiovascular:  No chest pain or diaphoresis  Respiratory:  No coughing, wheezing, or shortness of breath. GI:  No nausea or vomitting.   No diarrhea  :  No hematuria or dysuria  Skin:  No rashes or moles  Neuro:  Weakness as above, no seizures or syncope  Hematological:  No bruising or bleeding  Endocrine:  No current diabetes or thyroid disease          Current Facility-Administered Medications   Medication Dose Route Frequency    acetaminophen (TYLENOL) tablet 650 mg  650 mg Oral Q4H PRN    amLODIPine (NORVASC) tablet 10 mg  10 mg Oral DAILY    cyclobenzaprine (FLEXERIL) tablet 5 mg  5 mg Oral TID PRN    labetalol (NORMODYNE) tablet 300 mg  300 mg Oral BID    venlafaxine-SR (EFFEXOR-XR) capsule 150 mg  150 mg Oral DAILY    sodium chloride (NS) flush 5-40 mL  5-40 mL IntraVENous Q8H    sodium chloride (NS) flush 5-40 mL  5-40 mL IntraVENous PRN    aspirin tablet 325 mg  325 mg Oral DAILY    atorvastatin (LIPITOR) tablet 80 mg  80 mg Oral DAILY    arformoterol (BROVANA) neb solution 15 mcg  15 mcg Nebulization BID RT    budesonide (PULMICORT) 500 mcg/2 ml nebulizer suspension  500 mcg Nebulization BID RT    acyclovir (ZOVIRAX) capsule 400 mg  400 mg Oral DAILY    labetalol (NORMODYNE;TRANDATE) 20 mg/4 mL (5 mg/mL) injection 10 mg  10 mg IntraVENous Q3H PRN            Objective:     Patient Vitals for the past 8 hrs:   BP Temp Pulse Resp SpO2   05/31/19 2100 141/85  74 27 97 %   05/31/19 2000 139/82 98.8 °F (37.1 °C) 78 24 95 %   05/31/19 1900 (!) 147/95  75 21 95 %   05/31/19 1800 (!) 120/108  77 19 96 %   05/31/19 1700 158/89  74 27 92 %   05/31/19 1600   74 26 92 %   05/31/19 1500   78 19 92 %       NEUROLOGICAL EXAM:    Appearance: The patient is well developed, provides a coherent history. She is mildly slow to answear questions. Follow commands. Severe lymphedema in bilateral low extremity below her knees. Mental Status: Oriented to time to day and month but not to year. Oriented to  place and person. No dysarthria or dysphasia. Mood and affect appropriate. Cranial Nerves:   Intact visual fields.  AIDEE, EOM's full, With brief nystagmus at extreme end gaze bilaterally. no ptosis. Facial sensation is normal.  Facial movement is symmetric. Hearing is normal bilaterally. Palate is midline. Tongue is midline. Motor:  5/5 strength in upper and lower proximal and distal muscles. Normal bulk and tone. No fasciculations. Reflexes:   Deep tendon reflexes 2+/4 in the arm and difficulty to elicit reflexes in the legs due to excessive edema. Sensory:   Normal to touch, and vibration. Gait:  defered   Tremor:   No tremor noted. Cerebellar:  FTN intact bilaterally   Neurovascular:  Normal heart sounds and regular rhythm, and no carotid bruits. Lab Review   Recent Results (from the past 24 hour(s))   CBC WITH AUTOMATED DIFF    Collection Time: 05/31/19  2:00 AM   Result Value Ref Range    WBC 9.4 4.6 - 13.2 K/uL    RBC 4.16 (L) 4.20 - 5.30 M/uL    HGB 8.0 (L) 12.0 - 16.0 g/dL    HCT 27.8 (L) 35.0 - 45.0 %    MCV 66.8 (L) 74.0 - 97.0 FL    MCH 19.2 (L) 24.0 - 34.0 PG    MCHC 28.8 (L) 31.0 - 37.0 g/dL    RDW 17.9 (H) 11.6 - 14.5 %    PLATELET 319 689 - 630 K/uL    NEUTROPHILS 80 (H) 40 - 73 %    LYMPHOCYTES 10 (L) 21 - 52 %    MONOCYTES 9 3 - 10 %    EOSINOPHILS 1 0 - 5 %    BASOPHILS 0 0 - 2 %    ABS. NEUTROPHILS 7.5 1.8 - 8.0 K/UL    ABS. LYMPHOCYTES 1.0 0.9 - 3.6 K/UL    ABS. MONOCYTES 0.8 0.05 - 1.2 K/UL    ABS. EOSINOPHILS 0.1 0.0 - 0.4 K/UL    ABS.  BASOPHILS 0.0 0.0 - 0.1 K/UL    DF AUTOMATED     METABOLIC PANEL, BASIC    Collection Time: 05/31/19  2:00 AM   Result Value Ref Range    Sodium 140 136 - 145 mmol/L    Potassium 3.3 (L) 3.5 - 5.5 mmol/L    Chloride 101 100 - 108 mmol/L    CO2 30 21 - 32 mmol/L    Anion gap 9 3.0 - 18 mmol/L    Glucose 90 74 - 99 mg/dL    BUN 13 7.0 - 18 MG/DL    Creatinine 1.55 (H) 0.6 - 1.3 MG/DL    BUN/Creatinine ratio 8 (L) 12 - 20      GFR est AA 43 (L) >60 ml/min/1.73m2    GFR est non-AA 35 (L) >60 ml/min/1.73m2    Calcium 9.0 8.5 - 10.1 MG/DL   LIPID PANEL    Collection Time: 05/31/19 2:00 AM   Result Value Ref Range    LIPID PROFILE          Cholesterol, total 138 <200 MG/DL    Triglyceride 91 <150 MG/DL    HDL Cholesterol 37 (L) 40 - 60 MG/DL    LDL, calculated 82.8 0 - 100 MG/DL    VLDL, calculated 18.2 MG/DL    CHOL/HDL Ratio 3.7 0 - 5.0     AMMONIA    Collection Time: 05/31/19  4:45 PM   Result Value Ref Range    Ammonia 45 (H) 11 - 32 UMOL/L     Imaging:   MRI Results (most recent):  Results from Hospital Encounter encounter on 05/30/19   MRI BRAIN WO CONT    Narrative EXAM: MRI BRAIN W/O CONTRAST    INDICATION: Confusion, difficulty with mobilization    COMPARISON: No prior study available in the [facility], on line  review of outside [facility] noncontrast MR brain 1/26/2014    TECHNIQUE: Multiplanar multi sequence MR imaging of the brain was performed  utilizing axial T2, FLAIR, diffusion, coronal T2, and multiplanar T1 pre  contrast imaging . Additional dedicated susceptibility weighted imaging was  performed including MIP and filtered phase reconstruction images. No gadolinium  was administered due to specific clinician request.  Imaging performed on wide  bore Discovery [id number] GEM suite 3T magnet [facility].     _______________________    FINDINGS:     VENTRICLES/EXTRA-AXIAL SPACES: The ventricles and sulci are normal in their size  and configuration. BRAIN PARENCHYMA: Moderate amount of T2/FLAIR hyperintense white matter lesions  are seen within the periventricular and subcortical white matter , including  patchy abnormal signal in the brainstem, which are nonspecific, but likely  represent chronic small vessel changes. Some of these T2/FLAIR changes do  include some patchy frontal lobe involvement laterally towards the vertex with  prominent subcortical involvement and questionable cortical involvement. These  white matter changes overall have progressed since 2014, particularly with the  subcortical white matter involvement.      Multiple foci of susceptibility artifact are present, mostly located within the  bilateral basal ganglia. -These include a focal rounded lesion along the right periventricular white  matter at the superior margin of the basal ganglia, susceptibility weighted  image 65. This lesion also appears to be associated with 2 adjacent curvilinear  vessels, as seen on susceptibility weighted imaging.  -Additional more confluent/vague susceptibility artifact is seen along the  superior margin of the left thalamus, adjacent to a chronic thalamic infarct. Additional vague surrounding T2/FLAIR hyperintensity is present. Curvilinear  abnormal susceptibility artifact was also seen along the left thalamus on the  2014 study, which was associated with prominent adjacent vague thalamic  parenchymal T2/FLAIR hyperintensity, and focal CT hyperdensity representing  acute hemorrhage.   -Several additional more punctate susceptibility foci are seen throughout the  bilateral basal ganglia, especially thalami.  -Small left-sided focus is present posterior left temporal lobe as well.  -Of note, almost all of these foci of susceptibility artifact appear to be new  in comparison to the 2014 study with exception of the left thalamic curvilinear  abnormal signal. No convincing hyperdense acute hemorrhage is seen on review of  recent 5/30/2019 CT. Chronic posterior medial right thalamic lacunar infarct is suggested in addition  to the posterior left thalamic infarct described above. Discrete foci of central fluid signal with surrounding FLAIR hyperintensity are  seen along the right putamen and extending anteriorly into the anterior limb  internal capsule and caudate nucleus representing chronic small infarcts. No evidence of intracranial mass effect, midline shift, or herniation. No abnormal restricted diffusion to suggest acute infarct.        VASCULATURE:  The major intracranial vascular flow voids are grossly normal. No  definite abnormal signal or susceptibility artifact is identified along the  internal cerebral veins or straight sinus. ORBITS: The visualized orbits are unremarkable. PARANASAL SINUSES: Visualized paranasal sinuses are clear. MASTOID AIR CELLS: Minimal  fluid signal is present in the bilateral mastoid air  cells, nonspecific but likely inflammatory. Visualized posterior nasopharynx is  unremarkable. OSSEOUS STRUCTURES: Unremarkable    OTHER: None.      ________________________      Impression IMPRESSION:    1. No acute infarct, mass effect, or herniation. 2. Curvilinear slightly vague susceptibility artifact involving the posterior  and superomedial aspect of the left thalamus with confluent T2/FLAIR  hyperintensity, which appears to represent sequela of intraparenchymal left  thalamic hemorrhage seen on outside 1/26 2014 study. Underlying left thalamic  chronic infarct suggested. 3. New confluent T2/FLAIR hyperintensity in the contralateral right thalamus,  associated with clustered small foci of susceptibility artifact, new versus 2014  study. Suspect these findings represent foci of chronic microhemorrhage, often  hypertensive in etiology. Given the surrounding T2/FLAIR hyperintensity, these  hemorrhages may be more recent, however no T1 signal abnormality or CT  hyperdensity is seen.  -Atypical inflammatory type response to microhemorrhage (akin to cerebral  amyloid angiopathy-related inflammation, CAA-I) is possible especially given the  multiple additional susceptibility foci.  -Small foci of hemorrhage related to atypical posterior reversible  encephalopathy syndrome/hypertensive encephalopathy is possible given the  additional patchy bilateral frontal lobe subcortical/?cortical signal  abnormality as well. 4. Discrete lesion with susceptibility artifact along the right posterior  periventricular white matter and superior margin basal ganglia suspicious for  hemosiderin staining from prior hemorrhage.  A cavernous hemangioma is possible,  perhaps with adjacent developmental venous anomaly given suspected adjacent  vascularity; these two malformations may occur together. Hemorrhage related to  underlying small AVM is possible.  -Suggest follow-up MRA head and MR brain imaging to include postcontrast imaging  (as well as repeat DWI, susceptibility weighted imaging, FLAIR, and noncontrast  T1 imaging). 5. Moderate nonspecific white matter disease likely representing chronic small  vessel changes. White matter disease has progressed since 2014. Some of these  white matter changes are particularly subcortical in the frontal lobe towards  the vertex with questionable cortical involvement, as described in #3. 6. Chronic small infarcts involving the right lentiform nucleus and caudate  nucleus. CT Results (most recent):  Results from Hospital Encounter encounter on 05/30/19   CT HEAD WO CONT    Narrative EXAM: CT HEAD WO CONT    CLINICAL INDICATION/HISTORY: increased confusion    COMPARISON: None. TECHNIQUE: Axial CT imaging of the head was performed without intravenous  contrast. Sagittal and coronal reconstructions are provided. One or more dose  reduction techniques were used on this CT: automated exposure control,  adjustment of the mAs and/or kVp according to patient size, and iterative  reconstruction techniques. The specific techniques used on this CT exam have  been documented in the patient's electronic medical record. Digital Imaging and  Communications in Medicine (DICOM) format image data are available to  nonaffiliated external healthcare facilities or entities on a secure, media  free, reciprocally searchable basis with patient authorization for at least a  12-month period after this study      _______________    FINDINGS:    BRAIN AND POSTERIOR FOSSA: The sulci, folia, ventricles and basal cisterns are  within normal limits for the patient's age.  There is no intracranial hemorrhage,  mass effect, or midline shift. There are moderate patchy and confluent foci of  decreased attenuation in the periventricular white matter which are nonspecific  but most likely sequelae of chronic microvascular disease. Age-indeterminate  subacute or chronic thalamic infarcts, particularly on the right on axial image  14. Ovoid hypodensity more inferiorly adjacent to the right basal ganglia  suggesting sequela of lacunar infarct versus dilated perivascular space. EXTRA-AXIAL SPACES AND MENINGES: There are no abnormal extra-axial fluid  collections. CALVARIUM: Intact. SINUSES: Clear. OTHER: None.    _______________      Impression IMPRESSION:      1. Age-indeterminate subacute or chronic thalamic infarcts. Consider follow-up  MRI for further evaluation. 2. Additional findings presumably related to sequela of chronic microvascular  ischemic change. Initial preliminary report was provided to the ED by the on-call radiology  resident. Additional comments:reviewed above, including imaging         Assessment:     Principal Problem:    CVA (cerebral vascular accident) ([facility] (5/30/2019)    Active Problems:    COPD (chronic obstructive pulmonary disease) ([facility] [facility] (5/30/2019)      Cerebrovascular disease (5/30/2019)      Hyperlipidemia (5/30/2019)      Hypertensive emergency (5/30/2019)      HTN (hypertension) (5/31/2019)        Plan:   Encephalopathy, mostly hypertensive Encephalopathy, mixed with metabolic abnormality:  Mild Chronic renal insufficieny and mild elevated ammonia. Will check TSH given her severe lymphedema. No acute CVA in the brain MRI. But multiple chronic ischemic infarcts, chronic microvascular ischemic changes and  chronic  microhemorrhage  due to cerebral amyloid angiopathy-related inflammation,or  Hypertensive in nature, with possible atypical posterior reversible encephalopathy syndrome/hypertensive encephalopathy.    She will need to have a f/u MRI and MRA to monitor Discrete lesion with susceptibility artifact along the right posterior  periventricular white matter and superior margin basal ganglia suspicious for hemosiderin staining from prior hemorrhage. A cavernous hemangioma is possible, perhaps with adjacent developmental venous anomaly given suspected adjacent  vascularity; these two malformations may occur together. Hemorrhage related to underlying small AVM is possible per radiology. .  Hypertensive emergency: BP management per IM team    Secondary stroke prevention is d/w with her today: Daily ASA and control the comorbidity. DVT prophylaxis.          Signed:  Monica Childers MD  5/31/2019  9:36 PM

## 2019-06-01 NOTE — PROGRESS NOTES
Internal Medicine Progress Note        NAME: Gertrudis Schwab   :  1970  MRM:  [id number]    Date/Time: 2019        ASSESSMENT/PLAN:    # Acute Metabolic encephalopathy , likely hypertensive and CKD related and resulted in Acute delirious state . Improved. Admitted with possible   CVA (cerebral vascular accident) ([facility] [address] [address]  . MRI negative for CVA. Neuro-observation. control  Hypertension. PT/OT/SLP/CM-  ASA , Statin . Neurology consuslted. - Per daughter , she has old CVA 1 and some sequelae from it with leg weakness, some mental disturbances and speech defects at times. Stable to transfer out ICU. #   COPD (chronic obstructive pulmonary disease) ([facility] [address] (2019). Stable    #   Hyperlipidemia (2019). Statin     # CKD3. Monitor labs. Avoid nephrotoxins. Renally dosing medications. Monitor urine out put. #  Hypertensive emergency (2019). BP lowered  safely . Adjust meds as indicated. #  HTN (hypertension) (2019). Normally not well controlled per patient     # chronic Anemia. # Mild hypokalemia    # Morbid  Obesity with Lymphedema both legs.  TSH normal  - Body mass index is 55.25 kg/m².      -Code status : FULL    Lab Review:     Recent Labs     19  02119  0200 19  1709   WBC 5.6 9.4 13.0   HGB 8.0* 8.0* 8.4*   HCT 27.5* 27.8* 29.4*    245 292     Recent Labs     19  02119  0200 19  1709    140 139   K 3.6 3.3* 3.7    101 99*   CO2 32 30 33*   GLU 99 90 105*   BUN 17 13 14   CREA 1.77* 1.55* 1.63*   CA 9.1 9.0 9.2   MG 2.2  --   --    PHOS 4.7  --   --    ALB  --   --  3.0*   TBILI  --   --  1.0   SGOT  --   --  24   ALT  --   --  13     Lab Results   Component Value Date/Time    Glucose (POC) 121 (H) 10/08/2018 09:33 PM    Glucose (POC) 109 10/08/2018 11:26 AM    Glucose (POC) 90 10/08/2018 07:31 AM    Glucose (POC) 101 10/07/2018 09:59 PM    Glucose (POC) 105 10/07/2018 04:01 PM     No results for input(s): PH, PCO2, PO2, HCO3, FIO2 in the last 72 hours. No results for input(s): INR in the last 72 hours. No lab exists for component: INREXT, INREXT    No results found for: SDES  Lab Results   Component Value Date/Time    Culture result:  10/10/2018 03:59 PM     MRSA target DNA is not detected (presumptive not colonized with MRSA)    Culture result: NO GROWTH 6 DAYS 10/03/2018 12:40 PM    Culture result: NO GROWTH 6 DAYS 10/03/2018 12:23 PM       All Cardiac Markers in the last 24 hours:   No results found for: CPK, CK, CKMMB, CKMB, RCK3, CKMBT, CKNDX, CKND1, SHASTA, TROPT, TROIQ, BRIDGETTE, TROPT, TNIPOC, BNP, BNPP           Subjective:     Chief Complaint:      Feel better. Offer no complain, bu generalized weakness    ROS:  (bold if positive,otherwise negative)    Fever/chills ,  Dysuria   Cough , Sputum , SOB/BIRD , Chest Pain     Diarrhea ,Nausea/Vomit , Abd Pain , Constipation   Tolerating PT , Tolerating Diet                Objective:     Vitals:  Last 24hrs VS reviewed since prior progress note.  Most recent are:    Visit Vitals  /77   Pulse 66   Temp 97.6 °F (36.4 °C)   Resp 21   Ht 5' 4\" (1.626 m)   Wt 146 kg (321 lb 14 oz)   SpO2 100%   BMI 55.25 kg/m²     SpO2 Readings from Last 6 Encounters:   06/01/19 100%   11/24/18 100%   10/23/18 98%   10/10/18 94%   08/01/18 96%   05/14/18 99%    O2 Flow Rate (L/min): 2 l/min       Intake/Output Summary (Last 24 hours) at 6/1/2019 1200  Last data filed at 6/1/2019 1000  Gross per 24 hour   Intake 400 ml   Output 700 ml   Net -300 ml          Physical Exam:   Gen: Well-developed, well-nourished, in no acute distress  HEENT: Head atraumatic, normocephalic , Pink conjunctivae,  hearing intact to voice, moist mucous membranes  Neck: No apparent JVD, Supple, without masses, thyroid non-tender  Resp: No accessory muscle use, Bilateral BS present,clear breath sounds without wheezes rales or rhonchi  Card: No murmurs, normal S1, S2 without thrills, bruits or Gallop.  significant lower leg peripheral edema,Lymphedema both legs. Abd:  Soft, non-tender, not distended, normoactive bowel sounds are present   Musc: No cyanosis or clubbing  Skin: No rashes or ulcers, skin turgor is good   Neuro:  Cranial nerves are grossly intact, no clear area of focal motor weakness, follows commands appropriately  Psych:    oriented to person, place and time, alert.      Telemetry reviewed:   normal sinus rhythm    Medications Reviewed: (see below)    Lab Data Reviewed: (see below)    ______________________________________________________________________    Medications:     Current Facility-Administered Medications   Medication Dose Route Frequency    acetaminophen (TYLENOL) tablet 650 mg  650 mg Oral Q4H PRN    amLODIPine (NORVASC) tablet 10 mg  10 mg Oral DAILY    cyclobenzaprine (FLEXERIL) tablet 5 mg  5 mg Oral TID PRN    labetalol (NORMODYNE) tablet 300 mg  300 mg Oral BID    venlafaxine-SR (EFFEXOR-XR) capsule 150 mg  150 mg Oral DAILY    sodium chloride (NS) flush 5-40 mL  5-40 mL IntraVENous Q8H    sodium chloride (NS) flush 5-40 mL  5-40 mL IntraVENous PRN    aspirin tablet 325 mg  325 mg Oral DAILY    atorvastatin (LIPITOR) tablet 80 mg  80 mg Oral DAILY    arformoterol (BROVANA) neb solution 15 mcg  15 mcg Nebulization BID RT    budesonide (PULMICORT) 500 mcg/2 ml nebulizer suspension  500 mcg Nebulization BID RT    acyclovir (ZOVIRAX) capsule 400 mg  400 mg Oral DAILY    labetalol (NORMODYNE;TRANDATE) 20 mg/4 mL (5 mg/mL) injection 10 mg  10 mg IntraVENous Q3H PRN          Total time spent with patient: 35 minutes                  Care Plan discussed with: Patient, Family and Nursing Staff      Discussed:  Care Plan       Disposition:  Home w/Family             Attending Physician: Oswaldo Christian MD

## 2019-06-01 NOTE — PROGRESS NOTES
Problem: Falls - Risk of  Goal: *Absence of Falls  Description  Document Mariella Fan Fall Risk and appropriate interventions in the flowsheet.   Outcome: Progressing Towards Goal     Problem: Pain  Goal: *Control of Pain  Outcome: Progressing Towards Goal  Goal: *PALLIATIVE CARE:  Alleviation of Pain  Outcome: Progressing Towards Goal     Problem: Patient Education: Go to Patient Education Activity  Goal: Patient/Family Education  Outcome: Progressing Towards Goal     Problem: Patient Education: Go to Patient Education Activity  Goal: Patient/Family Education  Outcome: Progressing Towards Goal     Problem: Discharge Planning  Goal: *Discharge to safe environment  Outcome: Progressing Towards Goal

## 2019-06-01 NOTE — PROGRESS NOTES
Problem: Mobility Impaired (Adult and Pediatric)  Goal: *Acute Goals and Plan of Care (Insert Text)  Description  Physical Therapy Goals  Initiated 6/1/2019 and to be accomplished within 7 day(s)  1. Patient will move from supine to sit and sit to supine in bed with supervision in order to facilitate eating meals in sitting. 2.  Patient will transfer from bed to chair and chair to bed with supervision/set-up using the least restrictive device in order to facilitate participation in sitting ADLs. 3.  Patient will perform sit to stand with supervision/set-up in order to prepare for standing ADLs. 4.  Patient will ambulate with SBA for >/=50 feet with the least restrictive device in order to facilitate improved ease with ambulation to the restroom. 5.  Patient will ascend/descend 3 stairs with handrail(s) with minimal assistance/contact guard assist in order to prepare patient to safely enter residence. Outcome: Progressing Towards Goal  PHYSICAL THERAPY EVALUATION    Patient: Marcellus Leon (20 y.o. female)  Date: 6/1/2019  Primary Diagnosis: CVA (cerebral vascular accident) ([facility] [I63.9]  Hypertensive emergency [I16.1]  Hyperlipidemia [E78.5]  COPD (chronic obstructive pulmonary disease) ([facility] [J44.9]  Cerebrovascular disease [I67.9]        Precautions: Fall       PLOF: Independent ambulation     ASSESSMENT :  Patient is a 44 y.o. female who presents to Physical Therapy with diagnosis of CVA (cerebral vascular accident) ([facility] [I63.9]  Hypertensive emergency [I16.1]  Hyperlipidemia [E78.5]  COPD (chronic obstructive pulmonary disease) ([facility] [J44.9]  Cerebrovascular disease [I67.9]. Patient is supine and agrees to participate in PT services. Upon objective evaluation, patient demonstrated full JAJA LE AROM in all directions except decreased L knee and hip flexion, impaired JAJA LE strength in JAJA hip flexion, intact JAJA LE light touch sensation, and decreased standing static and dynamic balance. Patient ambulated 4 ft with RW and required CGA for safety. Patient requires between minimal assistance/contact guard assist and supervision/set-up for bed mobility, transfers and ambulation. Patient can benefit from skilled PT interventions to decrease r/o falls and improve JAJA LE strength, increase walking/standing tolerance, and improve body mechanics mechanics with bed mobility and transfers to facilitate ADL's & overall functional status/quality of life. Patient will benefit from skilled intervention to address the above impairments. Patient's rehabilitation potential is considered to be Good  Factors which may influence rehabilitation potential include:   ? None noted  ? Mental ability/status  ? Medical condition  ? Home/family situation and support systems  ? Safety awareness  ? Pain tolerance/management  ? Other:      PLAN :  Recommendations and Planned Interventions:   ?           Bed Mobility Training             ? Neuromuscular Re-Education  ? Transfer Training                   ? Orthotic/Prosthetic Training  ? Gait Training                          ? Modalities  ? Therapeutic Exercises           ? Edema Management/Control  ? Therapeutic Activities            ? Family Training/Education  ? Patient Education  ? Other (comment):    Frequency/Duration: Patient will be followed by physical therapy 4-7x per week to address goals. Discharge Recommendations: Home Health  Further Equipment Recommendations for Discharge: RW (has)     SUBJECTIVE:   Patient stated I don't know why I'm so tiered today.     OBJECTIVE DATA SUMMARY:     Past Medical History:   Diagnosis Date    Anemia     Bone lesion     Chronic kidney disease     Depressed     Diabetes ([address]     Diabetes mellitus ([address]     Hypertension     Inverted nipple     RT nipple inverted - pt states that it has always been that way    Kidney problem     Pneumonia     Sarcoidosis     Thyroid disorder     Uterus problem      Past Surgical History:   Procedure Laterality Date    HX  SECTION      HX OTHER SURGICAL      neck biopsy    HX TUBAL LIGATION       Barriers to Learning/Limitations: None  Compensate with: N/A  Home Situation:  Home Situation  Home Environment: Apartment  # Steps to Enter: 10  Rails to Enter: Yes  Hand Rails : Bilateral  One/Two Story Residence: (lives on 2nd floor)  Living Alone: No  Support Systems: Child(jamie)  Patient Expects to be Discharged to[de-identified] Apartment  Current DME Used/Available at Home: Grab bars, Walker, rolling  Tub or Shower Type: Tub/Shower combination(grab bars)  Critical Behavior:  Neurologic State: Alert; Appropriate for age  Orientation Level: Oriented X4  Cognition: Follows commands  Safety/Judgement: Fall prevention  Psychosocial  Patient Behaviors: Calm; Cooperative  Family  Behaviors: Calm; Cooperative  Purposeful Interaction: Yes  Pt Identified Daily Priority: Clinical issues (comment)  Caritas Process: Establish trust;Create healing environment; Teaching/learning  Caring Interventions: Reassure; Therapeutic modalities  Reassure: Acceptance;Quiet presence;Caring rounds  Therapeutic Modalities: Intentional therapeutic touch  Skin Condition/Temp: Warm  Family  Behaviors: Calm; Cooperative  Skin Integrity: Intact  Skin Integumentary  Skin Color: Appropriate for ethnicity  Skin Condition/Temp: Warm  Skin Integrity: Intact  Turgor: Non-tenting  Hair Growth: Absent  Varicosities: Absent    Strength:    Strength: Generally decreased, functional(B Hip Flex 2+/5, B Knee Ext 5-/5, R/L Knee flex 5/4+, B DF 5)    Tone & Sensation:   Tone: Normal    Sensation: Intact    Range Of Motion:  AROM: Generally decreased, functional(Decreased and Sore L Hip and knee flexion >/=1/2 AROM )    Functional Mobility:  Bed Mobility:  Rolling: Supervision  Supine to Sit: Contact guard assistance  Sit to Supine: Minimum assistance(Min A w/ mobility of JAJA LEs from dependent position to bed)  Scooting: Contact guard assistance  Transfers:  Sit to Stand: Minimum assistance  Stand to Sit: Contact guard assistance    Balance:   Sitting: Impaired  Sitting - Static: Good (unsupported)  Sitting - Dynamic: Fair (occasional)  Standing: Impaired  Standing - Static: Fair(CGA with static standing)  Standing - Dynamic : Fair(CGA with dynamic standing)  Ambulation/Gait Training:  Distance (ft): 4 Feet (ft)  Assistive Device: Walker, rolling  Ambulation - Level of Assistance: Contact guard assistance     Gait Description (WDL): Exceptions to WDL  Gait Abnormalities: Step to gait  Right Side Weight Bearing: Full  Left Side Weight Bearing: Full  Base of Support: Center of gravity altered  Stance: Time  Speed/Evelyn: Pace decreased (<100 feet/min)  Step Length: Left shortened;Right shortened  Swing Pattern: Left asymmetrical;Right asymmetrical    Pain:  Pain level pre-treatment: 0/10   Pain level post-treatment: 0/10   Pain Intervention(s) : Medication (see MAR); Rest and Repositioning  Response to intervention: Nurse notified, See doc flow Sondra    Activity Tolerance: Fair secondary to quick fatigue with functional mobility    Please refer to the flowsheet for vital signs taken during this treatment. After treatment:   ?         Patient left in no apparent distress sitting up in chair  ? Patient left in no apparent distress in bed  ? Call bell left within reach  ? Nursing notified  ? Caregiver present  ? Bed alarm activated  ? SCDs applied    COMMUNICATION/EDUCATION:   ?         Role of Physical Therapy in the acute care setting. ?         Fall prevention education was provided and the patient/caregiver indicated understanding. ? Patient/family have participated as able in goal setting and plan of care.   ?         Patient/family agree to work toward stated goals and plan of care. ?         Patient understands intent and goals of therapy, but is neutral about his/her participation. ? Patient is unable to participate in goal setting/plan of care: ongoing with therapy staff. ?         Other:     Thank you for this referral.  Du Frazierus   Time Calculation: 23 mins

## 2019-06-01 NOTE — PROGRESS NOTES
2000 Assumed care of pt after bedside report with pt lying in bed with HOB elevated. Monitor denotes NSR. AAO x 4. MARIE x 4 but more difficult with BLE due to severe edema, elephantiasis . Speech clear but delayed responses noted at times. Abd distended, obese, pannus. Voiding in [facility] as needed. Visitors at bedside. Denies c/o pain, discomfort. 2200 Watching TV at present without complaints. 2305 Labetatol 10 mg IV given for SBP>160. When questioning pt bout her BP meds, she states that her Dr. Yin Darnell her to stop taking both BP meds. Advised pt that HTN causes damage to heart, lungs, kidneys without sx. Denies c/o pain, discomfort. 06/01/2019 0000 Dr. Alexander Sutton advised that neurology wants pt's SBP <160 and DBP <95. Advised him  That just gave Labetatol IV as ordered and SBP is better but not totally within parameters. He advised nurse to just monitor pt for now rather than add another med.  0100 Pt up to [facility] with minimal assistance. Voiding without difficulty. 0210 AM labs drawn and sent to [id number] Labetalol 10 mg IV given for elevated BP. Rob Spies 0500 Pt trying to get OOB without assistance. Advised pt to call light for assistance which is within her reach. Pt verbalizes understanding.

## 2019-06-01 NOTE — PROGRESS NOTES
0700am- Bedside report received from BRET Mckinley    1130am- K-level 3.6. Dr. Karan Peterson is aware and notified.  No action done at this time as per MD.    1353pm- Transfer pt from Rm 2607 to 3035pm

## 2019-06-01 NOTE — PROGRESS NOTES
0725 Bedside report given to Melissa Sears (oncoming nurse) per Marisol Trimble RN (offgoing nurse) utililzing SBAR, MAR, Kardex, I&O, results review, and EKG interpretation with rate and rhythm.

## 2019-06-01 NOTE — PROGRESS NOTES
Neurology Progress Note    Patient ID:  Dante Christian  [id number]  44 y.o.  1970    Subjective:      She is more lucid and respsonding better today than yesterday. No focal weakness. TSH was normal.   Her BP is better controlled and dose not require IV medication since this AM.       Current Facility-Administered Medications   Medication Dose Route Frequency    acetaminophen (TYLENOL) tablet 650 mg  650 mg Oral Q4H PRN    amLODIPine (NORVASC) tablet 10 mg  10 mg Oral DAILY    cyclobenzaprine (FLEXERIL) tablet 5 mg  5 mg Oral TID PRN    labetalol (NORMODYNE) tablet 300 mg  300 mg Oral BID    venlafaxine-SR (EFFEXOR-XR) capsule 150 mg  150 mg Oral DAILY    sodium chloride (NS) flush 5-40 mL  5-40 mL IntraVENous Q8H    sodium chloride (NS) flush 5-40 mL  5-40 mL IntraVENous PRN    aspirin tablet 325 mg  325 mg Oral DAILY    atorvastatin (LIPITOR) tablet 80 mg  80 mg Oral DAILY    arformoterol (BROVANA) neb solution 15 mcg  15 mcg Nebulization BID RT    budesonide (PULMICORT) 500 mcg/2 ml nebulizer suspension  500 mcg Nebulization BID RT    acyclovir (ZOVIRAX) capsule 400 mg  400 mg Oral DAILY    labetalol (NORMODYNE;TRANDATE) 20 mg/4 mL (5 mg/mL) injection 10 mg  10 mg IntraVENous Q3H PRN            Objective:     Patient Vitals for the past 8 hrs:   BP Temp Pulse Resp SpO2   06/01/19 1300 144/86  (!) 59 19 100 %   06/01/19 1200 138/84 98 °F (36.7 °C) 61 21 100 %   06/01/19 1100 126/77  66 21    06/01/19 0900 160/87  70 22 100 %   06/01/19 0810     100 %   06/01/19 0800 (!) 141/95 97.8 °F (36.6 °C) 69 (!) 33 100 %   06/01/19 0700 157/90  61 20 100 %     NEUROLOGICAL EXAM:     Appearance: The patient is well developed, provides a coherent history. She responds question well and follow commands. Mental Status: Oriented to time, place and person. No dysarthria or dysphasia. Mood and affect appropriate. Cranial Nerves:   Intact visual fields. AIDEE, EOM's full,  no ptosis.  Facial sensation is normal.  Facial movement is symmetric. Hearing is normal bilaterally. Palate is midline. Tongue is midline. Motor:  Lifting her arms and legs with no focal weakness. Normal bulk and tone. No fasciculations. Sensory:   Normal to touch, and vibration. Gait:  defered   Tremor:   No tremor noted. Cerebellar:  FTN intact bilaterally         Lab Review   Recent Results (from the past 24 hour(s))   AMMONIA    Collection Time: 05/31/19  4:45 PM   Result Value Ref Range    Ammonia 45 (H) 11 - 32 UMOL/L   CBC WITH AUTOMATED DIFF    Collection Time: 06/01/19  2:10 AM   Result Value Ref Range    WBC 5.6 4.6 - 13.2 K/uL    RBC 4.15 (L) 4.20 - 5.30 M/uL    HGB 8.0 (L) 12.0 - 16.0 g/dL    HCT 27.5 (L) 35.0 - 45.0 %    MCV 66.3 (L) 74.0 - 97.0 FL    MCH 19.3 (L) 24.0 - 34.0 PG    MCHC 29.1 (L) 31.0 - 37.0 g/dL    RDW 18.2 (H) 11.6 - 14.5 %    PLATELET 016 521 - 623 K/uL    NEUTROPHILS 71 40 - 73 %    LYMPHOCYTES 14 (L) 21 - 52 %    MONOCYTES 11 (H) 3 - 10 %    EOSINOPHILS 4 0 - 5 %    BASOPHILS 0 0 - 2 %    ABS. NEUTROPHILS 4.0 1.8 - 8.0 K/UL    ABS. LYMPHOCYTES 0.8 (L) 0.9 - 3.6 K/UL    ABS. MONOCYTES 0.6 0.05 - 1.2 K/UL    ABS. EOSINOPHILS 0.2 0.0 - 0.4 K/UL    ABS.  BASOPHILS 0.0 0.0 - 0.1 K/UL    DF AUTOMATED     METABOLIC PANEL, BASIC    Collection Time: 06/01/19  2:10 AM   Result Value Ref Range    Sodium 143 136 - 145 mmol/L    Potassium 3.6 3.5 - 5.5 mmol/L    Chloride 104 100 - 108 mmol/L    CO2 32 21 - 32 mmol/L    Anion gap 7 3.0 - 18 mmol/L    Glucose 99 74 - 99 mg/dL    BUN 17 7.0 - 18 MG/DL    Creatinine 1.77 (H) 0.6 - 1.3 MG/DL    BUN/Creatinine ratio 10 (L) 12 - 20      GFR est AA 37 (L) >60 ml/min/1.73m2    GFR est non-AA 30 (L) >60 ml/min/1.73m2    Calcium 9.1 8.5 - 10.1 MG/DL   PHOSPHORUS    Collection Time: 06/01/19  2:10 AM   Result Value Ref Range    Phosphorus 4.7 2.5 - 4.9 MG/DL   MAGNESIUM    Collection Time: 06/01/19  2:10 AM   Result Value Ref Range    Magnesium 2.2 1.6 - 2.6 mg/dL Imaging:   MRI Results (most recent):  Results from Hospital Encounter encounter on 05/30/19   MRI BRAIN WO CONT    Narrative EXAM: MRI BRAIN W/O CONTRAST    INDICATION: Confusion, difficulty with mobilization    COMPARISON: No prior study available in the [facility], on line  review of outside [facility] noncontrast MR brain 1/26/2014    TECHNIQUE: Multiplanar multi sequence MR imaging of the brain was performed  utilizing axial T2, FLAIR, diffusion, coronal T2, and multiplanar T1 pre  contrast imaging . Additional dedicated susceptibility weighted imaging was  performed including MIP and filtered phase reconstruction images. No gadolinium  was administered due to specific clinician request.  Imaging performed on wide  bore Discovery PM461k GEM suite 3T magnet at [facility].     _______________________    FINDINGS:     VENTRICLES/EXTRA-AXIAL SPACES: The ventricles and sulci are normal in their size  and configuration. BRAIN PARENCHYMA: Moderate amount of T2/FLAIR hyperintense white matter lesions  are seen within the periventricular and subcortical white matter , including  patchy abnormal signal in the brainstem, which are nonspecific, but likely  represent chronic small vessel changes. Some of these T2/FLAIR changes do  include some patchy frontal lobe involvement laterally towards the vertex with  prominent subcortical involvement and questionable cortical involvement. These  white matter changes overall have progressed since 2014, particularly with the  subcortical white matter involvement. Multiple foci of susceptibility artifact are present, mostly located within the  bilateral basal ganglia. -These include a focal rounded lesion along the right periventricular white  matter at the superior margin of the basal ganglia, susceptibility weighted  image 65.  This lesion also appears to be associated with 2 adjacent curvilinear  vessels, as seen on susceptibility weighted imaging.  -Additional more confluent/vague susceptibility artifact is seen along the  superior margin of the left thalamus, adjacent to a chronic thalamic infarct. Additional vague surrounding T2/FLAIR hyperintensity is present. Curvilinear  abnormal susceptibility artifact was also seen along the left thalamus on the  2014 study, which was associated with prominent adjacent vague thalamic  parenchymal T2/FLAIR hyperintensity, and focal CT hyperdensity representing  acute hemorrhage.   -Several additional more punctate susceptibility foci are seen throughout the  bilateral basal ganglia, especially thalami.  -Small left-sided focus is present posterior left temporal lobe as well.  -Of note, almost all of these foci of susceptibility artifact appear to be new  in comparison to the 2014 study with exception of the left thalamic curvilinear  abnormal signal. No convincing hyperdense acute hemorrhage is seen on review of  recent 5/30/2019 CT. Chronic posterior medial right thalamic lacunar infarct is suggested in addition  to the posterior left thalamic infarct described above. Discrete foci of central fluid signal with surrounding FLAIR hyperintensity are  seen along the right putamen and extending anteriorly into the anterior limb  internal capsule and caudate nucleus representing chronic small infarcts. No evidence of intracranial mass effect, midline shift, or herniation. No abnormal restricted diffusion to suggest acute infarct. VASCULATURE:  The major intracranial vascular flow voids are grossly normal. No  definite abnormal signal or susceptibility artifact is identified along the  internal cerebral veins or straight sinus. ORBITS: The visualized orbits are unremarkable. PARANASAL SINUSES: Visualized paranasal sinuses are clear. MASTOID AIR CELLS: Minimal  fluid signal is present in the bilateral mastoid air  cells, nonspecific but likely inflammatory.  Visualized posterior nasopharynx is  unremarkable. OSSEOUS STRUCTURES: Unremarkable    OTHER: None.      ________________________      Impression IMPRESSION:    1. No acute infarct, mass effect, or herniation. 2. Curvilinear slightly vague susceptibility artifact involving the posterior  and superomedial aspect of the left thalamus with confluent T2/FLAIR  hyperintensity, which appears to represent sequela of intraparenchymal left  thalamic hemorrhage seen on outside 1/26 2014 study. Underlying left thalamic  chronic infarct suggested. 3. New confluent T2/FLAIR hyperintensity in the contralateral right thalamus,  associated with clustered small foci of susceptibility artifact, new versus 2014  study. Suspect these findings represent foci of chronic microhemorrhage, often  hypertensive in etiology. Given the surrounding T2/FLAIR hyperintensity, these  hemorrhages may be more recent, however no T1 signal abnormality or CT  hyperdensity is seen.  -Atypical inflammatory type response to microhemorrhage (akin to cerebral  amyloid angiopathy-related inflammation, CAA-I) is possible especially given the  multiple additional susceptibility foci.  -Small foci of hemorrhage related to atypical posterior reversible  encephalopathy syndrome/hypertensive encephalopathy is possible given the  additional patchy bilateral frontal lobe subcortical/?cortical signal  abnormality as well. 4. Discrete lesion with susceptibility artifact along the right posterior  periventricular white matter and superior margin basal ganglia suspicious for  hemosiderin staining from prior hemorrhage. A cavernous hemangioma is possible,  perhaps with adjacent developmental venous anomaly given suspected adjacent  vascularity; these two malformations may occur together.  Hemorrhage related to  underlying small AVM is possible.  -Suggest follow-up MRA head and MR brain imaging to include postcontrast imaging  (as well as repeat DWI, susceptibility weighted imaging, FLAIR, and noncontrast  T1 imaging). 5. Moderate nonspecific white matter disease likely representing chronic small  vessel changes. White matter disease has progressed since 2014. Some of these  white matter changes are particularly subcortical in the frontal lobe towards  the vertex with questionable cortical involvement, as described in #3. 6. Chronic small infarcts involving the right lentiform nucleus and caudate  nucleus. CT Results (most recent):  Results from Hospital Encounter encounter on 05/30/19   CT HEAD WO CONT    Narrative EXAM: CT HEAD WO CONT    CLINICAL INDICATION/HISTORY: increased confusion    COMPARISON: None. TECHNIQUE: Axial CT imaging of the head was performed without intravenous  contrast. Sagittal and coronal reconstructions are provided. One or more dose  reduction techniques were used on this CT: automated exposure control,  adjustment of the mAs and/or kVp according to patient size, and iterative  reconstruction techniques. The specific techniques used on this CT exam have  been documented in the patient's electronic medical record. Digital Imaging and  Communications in Medicine (DICOM) format image data are available to  nonaffiliated external healthcare facilities or entities on a secure, media  free, reciprocally searchable basis with patient authorization for at least a  12-month period after this study      _______________    FINDINGS:    BRAIN AND POSTERIOR FOSSA: The sulci, folia, ventricles and basal cisterns are  within normal limits for the patient's age. There is no intracranial hemorrhage,  mass effect, or midline shift. There are moderate patchy and confluent foci of  decreased attenuation in the periventricular white matter which are nonspecific  but most likely sequelae of chronic microvascular disease. Age-indeterminate  subacute or chronic thalamic infarcts, particularly on the right on axial image  14.  Ovoid hypodensity more inferiorly adjacent to the right basal ganglia  suggesting sequela of lacunar infarct versus dilated perivascular space. EXTRA-AXIAL SPACES AND MENINGES: There are no abnormal extra-axial fluid  collections. CALVARIUM: Intact. SINUSES: Clear. OTHER: None.    _______________      Impression IMPRESSION:      1. Age-indeterminate subacute or chronic thalamic infarcts. Consider follow-up  MRI for further evaluation. 2. Additional findings presumably related to sequela of chronic microvascular  ischemic change. Initial preliminary report was provided to the ED by the on-call radiology  resident. Additional comments:above were reviewed. Assessment:     Principal Problem:    CVA (cerebral vascular accident) ([facility] (5/30/2019)    Active Problems:    COPD (chronic obstructive pulmonary disease) ([facility] (5/30/2019)      Cerebrovascular disease (5/30/2019)      Hyperlipidemia (5/30/2019)      Hypertensive emergency (5/30/2019)      HTN (hypertension) (5/31/2019)        Plan:   Encephalopathy, mostly hypertensive Encephalopathy, mixed with metabolic abnormality:  Mild Chronic renal insufficieny and mild elevated ammonia. Improving/imporved. Abnormal brain MRI:  No acute CVA in the brain MRI. But multiple chronic ischemic infarcts, chronic microvascular ischemic changes and  chronic microhemorrhage  due to cerebral amyloid angiopathy-related inflammation,or  Hypertensive in nature, with possible atypical posterior reversible encephalopathy syndrome/hypertensive encephalopathy. She will need to have a f/u MRI and MRA to monitor Discrete lesion with susceptibility artifact along the right posterior periventricular white matter and superior margin basal ganglia suspicious for hemosiderin staining from prior hemorrhage. A cavernous hemangioma is possible, perhaps with adjacent developmental venous anomaly given suspected adjacent  vascularity; these two malformations may occur together.  Hemorrhage related to underlying small AVM is possible per radiology. This can be done in outpatient once her Cr is improving in 1 month or so. Secondary stroke prevention is d/w with her today: Daily ASA and control the comorbidity.    .  Hypertensive emergency: BP management per IM team     Anemia : evaluation per IM team.      DVT prophylaxis.          Signed:  Elizabeth Mchugh MD  6/1/2019  2:25 PM

## 2019-06-02 VITALS
WEIGHT: 293 LBS | DIASTOLIC BLOOD PRESSURE: 92 MMHG | HEIGHT: 64 IN | HEART RATE: 71 BPM | RESPIRATION RATE: 20 BRPM | BODY MASS INDEX: 50.02 KG/M2 | SYSTOLIC BLOOD PRESSURE: 151 MMHG | TEMPERATURE: 98.2 F | OXYGEN SATURATION: 95 %

## 2019-06-02 LAB
ANION GAP SERPL CALC-SCNC: 7 MMOL/L (ref 3–18)
BASOPHILS # BLD: 0 K/UL (ref 0–0.1)
BASOPHILS NFR BLD: 0 % (ref 0–2)
BUN SERPL-MCNC: 19 MG/DL (ref 7–18)
BUN/CREAT SERPL: 11 (ref 12–20)
CALCIUM SERPL-MCNC: 9.2 MG/DL (ref 8.5–10.1)
CHLORIDE SERPL-SCNC: 103 MMOL/L (ref 100–108)
CO2 SERPL-SCNC: 32 MMOL/L (ref 21–32)
CREAT SERPL-MCNC: 1.79 MG/DL (ref 0.6–1.3)
DIFFERENTIAL METHOD BLD: ABNORMAL
EOSINOPHIL # BLD: 0.3 K/UL (ref 0–0.4)
EOSINOPHIL NFR BLD: 6 % (ref 0–5)
ERYTHROCYTE [DISTWIDTH] IN BLOOD BY AUTOMATED COUNT: 18.3 % (ref 11.6–14.5)
GLUCOSE SERPL-MCNC: 76 MG/DL (ref 74–99)
HCT VFR BLD AUTO: 26.5 % (ref 35–45)
HGB BLD-MCNC: 7.5 G/DL (ref 12–16)
LYMPHOCYTES # BLD: 0.9 K/UL (ref 0.9–3.6)
LYMPHOCYTES NFR BLD: 18 % (ref 21–52)
MAGNESIUM SERPL-MCNC: 2.1 MG/DL (ref 1.6–2.6)
MCH RBC QN AUTO: 19.1 PG (ref 24–34)
MCHC RBC AUTO-ENTMCNC: 28.3 G/DL (ref 31–37)
MCV RBC AUTO: 67.6 FL (ref 74–97)
MONOCYTES # BLD: 0.7 K/UL (ref 0.05–1.2)
MONOCYTES NFR BLD: 14 % (ref 3–10)
NEUTS SEG # BLD: 2.9 K/UL (ref 1.8–8)
NEUTS SEG NFR BLD: 62 % (ref 40–73)
PHOSPHATE SERPL-MCNC: 3.7 MG/DL (ref 2.5–4.9)
PLATELET # BLD AUTO: 265 K/UL (ref 135–420)
POTASSIUM SERPL-SCNC: 3.6 MMOL/L (ref 3.5–5.5)
RBC # BLD AUTO: 3.92 M/UL (ref 4.2–5.3)
SODIUM SERPL-SCNC: 142 MMOL/L (ref 136–145)
WBC # BLD AUTO: 4.7 K/UL (ref 4.6–13.2)

## 2019-06-02 PROCEDURE — 85025 COMPLETE CBC W/AUTO DIFF WBC: CPT

## 2019-06-02 PROCEDURE — 94640 AIRWAY INHALATION TREATMENT: CPT

## 2019-06-02 PROCEDURE — 80048 BASIC METABOLIC PNL TOTAL CA: CPT

## 2019-06-02 PROCEDURE — 94761 N-INVAS EAR/PLS OXIMETRY MLT: CPT

## 2019-06-02 PROCEDURE — 74011250636 HC RX REV CODE- 250/636: Performed by: INTERNAL MEDICINE

## 2019-06-02 PROCEDURE — 83735 ASSAY OF MAGNESIUM: CPT

## 2019-06-02 PROCEDURE — 74011250637 HC RX REV CODE- 250/637: Performed by: INTERNAL MEDICINE

## 2019-06-02 PROCEDURE — 97116 GAIT TRAINING THERAPY: CPT

## 2019-06-02 PROCEDURE — 74011250637 HC RX REV CODE- 250/637: Performed by: HOSPITALIST

## 2019-06-02 PROCEDURE — 36415 COLL VENOUS BLD VENIPUNCTURE: CPT

## 2019-06-02 PROCEDURE — 84100 ASSAY OF PHOSPHORUS: CPT

## 2019-06-02 PROCEDURE — 74011000250 HC RX REV CODE- 250: Performed by: HOSPITALIST

## 2019-06-02 RX ORDER — CLONIDINE HYDROCHLORIDE 0.1 MG/1
0.1 TABLET ORAL DAILY
Status: DISCONTINUED | OUTPATIENT
Start: 2019-06-02 | End: 2019-06-02

## 2019-06-02 RX ORDER — HYDRALAZINE HYDROCHLORIDE 25 MG/1
25 TABLET, FILM COATED ORAL 3 TIMES DAILY
Status: DISCONTINUED | OUTPATIENT
Start: 2019-06-02 | End: 2019-06-02 | Stop reason: HOSPADM

## 2019-06-02 RX ORDER — LABETALOL 300 MG/1
300 TABLET, FILM COATED ORAL 2 TIMES DAILY
Qty: 60 TAB | Refills: 0 | Status: SHIPPED | OUTPATIENT
Start: 2019-06-02 | End: 2019-07-02

## 2019-06-02 RX ORDER — HYDRALAZINE HYDROCHLORIDE 25 MG/1
25 TABLET, FILM COATED ORAL 3 TIMES DAILY
Qty: 90 TAB | Refills: 0 | Status: SHIPPED | OUTPATIENT
Start: 2019-06-02 | End: 2019-07-02

## 2019-06-02 RX ADMIN — AMLODIPINE BESYLATE 10 MG: 10 TABLET ORAL at 08:13

## 2019-06-02 RX ADMIN — LABETALOL 20 MG/4 ML (5 MG/ML) INTRAVENOUS SYRINGE 10 MG: at 08:13

## 2019-06-02 RX ADMIN — ATORVASTATIN CALCIUM 80 MG: 40 TABLET, FILM COATED ORAL at 08:13

## 2019-06-02 RX ADMIN — LABETALOL HYDROCHLORIDE 300 MG: 100 TABLET, FILM COATED ORAL at 08:12

## 2019-06-02 RX ADMIN — ASPIRIN 325 MG ORAL TABLET 325 MG: 325 PILL ORAL at 08:12

## 2019-06-02 RX ADMIN — BUDESONIDE 500 MCG: 0.5 INHALANT RESPIRATORY (INHALATION) at 09:06

## 2019-06-02 RX ADMIN — ARFORMOTEROL TARTRATE 15 MCG: 15 SOLUTION RESPIRATORY (INHALATION) at 09:06

## 2019-06-02 RX ADMIN — ACYCLOVIR 400 MG: 200 CAPSULE ORAL at 08:12

## 2019-06-02 RX ADMIN — VENLAFAXINE HYDROCHLORIDE 150 MG: 150 CAPSULE, EXTENDED RELEASE ORAL at 08:13

## 2019-06-02 NOTE — PROGRESS NOTES
Problem: Mobility Impaired (Adult and Pediatric)  Goal: *Acute Goals and Plan of Care (Insert Text)  Description  Physical Therapy Goals  Initiated 6/1/2019 and to be accomplished within 7 day(s)  1. Patient will move from supine to sit and sit to supine in bed with supervision in order to facilitate eating meals in sitting. 2.  Patient will transfer from bed to chair and chair to bed with supervision/set-up using the least restrictive device in order to facilitate participation in sitting ADLs. 3.  Patient will perform sit to stand with supervision/set-up in order to prepare for standing ADLs. 4.  Patient will ambulate with SBA for >/=50 feet with the least restrictive device in order to facilitate improved ease with ambulation to the restroom. 5.  Patient will ascend/descend 3 stairs with handrail(s) with minimal assistance/contact guard assist in order to prepare patient to safely enter residence. Outcome: Progressing Towards Goal  PHYSICAL THERAPY TREATMENT     Patient: Rossy Goldstein (12 y.o. female)  Date: 6/2/2019  Diagnosis: CV[facility]cerebral vascular accident) ([facility] [I63.9]  Hypertensive emergency [I16.1]  Hyperlipidemia [E78.5]  COPD (chronic obstructive pulmonary disease) ([facility] [J44.9]  Cerebrovascular disease [I67.9] CVA (cerebral vascular accident) ([facility]       Precautions:    PLOF: I ambulation     ASSESSMENT:  Patient sitting EOB, agreeable to participation with PT. Pt required SBA for sit <> stand. Patient ambulated 6 ft x2 without AD from bed to bathroom with SBA and 100 ft with RW and SBA. Demonstrated slight left lateral trunk lean and occasional path deviations during gait training. VC for upright trunk and decreased gait speed in order to decrease path deviations. Required frequent standing rest breaks during gait training secondary to patient reported fatigue and gait deviations.  Demonstrates good static standing balance and fair dynamic standing balance and requires SBA assist for safety. Patient left sitting in chair with all needs within reach. Patient has no c/o pain. Patient educated on safety with mobility and standing/seated exercises. Verbalizes understanding. Patient progressing well, plan to see for 1 - 2 more visits. Progression toward goals:        Improving appropriately and progressing toward goals       PLAN:  Patient continues to benefit from skilled intervention to address the above impairments. Continue treatment per established plan of care. EDUCATION:   Education: Patient was educated on the following topics: Safety and proper mechanics with bed mobility, transfers, ADLs, balance, amb, exercise, role of PT, plan of care, cognition, skin integrity, vitals       Barriers to Learning/Limitations: None  Compensate with: visual, verbal, tactile, kinesthetic cues/model    Discharge Recommendations:  Home Health  Further Equipment Recommendations for Discharge:  rolling walker (has)  Factors which may impact discharge planning: none     SUBJECTIVE:   Patient stated my skin is dry.     OBJECTIVE DATA SUMMARY:   Critical Behavior:  Neurologic State: Alert  Orientation Level: Oriented X4  Cognition: Follows commands  Safety/Judgement: Awareness of environment  Functional Mobility Training:  Bed Mobility:    Transfers:  Sit to Stand: Stand-by assistance  Stand to Sit: Stand-by assistance    Balance:  Sitting: Intact  Sitting - Static: Good (unsupported)  Sitting - Dynamic: Good (unsupported)  Standing: Impaired  Standing - Static: Good  Standing - Dynamic : Fair   Range Of Motion:     Ambulation/Gait Training:  Distance (ft): 100 Feet (ft)(10 ft SBA no AD, 100ft SBA RW)  Assistive Device: Walker, rolling  Ambulation - Level of Assistance: Stand-by assistance        Gait Abnormalities: Path deviations;Trunk sway increased  Right Side Weight Bearing: Full  Left Side Weight Bearing: Full  Base of Support: Center of gravity altered  Stance: Time  Speed/Evelyn: Pace decreased (<100 feet/min)  Step Length: Left shortened;Right shortened  Swing Pattern: Left symmetrical;Right symmetrical         Pain:  Pain level pre-treatment: 0/10   Pain level post-treatment: 0/10   Pain Intervention(s): Medication (see MAR); Rest  Response to intervention: Nurse adriane Ferrari    Activity Tolerance: Good     Please refer to the flowsheet for vital signs taken during this treatment. After treatment:   ? Patient left in no apparent distress sitting up in chair  ? Patient left in no apparent distress in bed  ? Call bell left within reach  ? Nursing notified  ? Caregiver present  ? Bed alarm activated  ? SCDs applied      COMMUNICATION/EDUCATION:   ?         Role of Physical Therapy in the acute care setting. ?         Fall prevention education was provided and the patient/caregiver indicated understanding. ? Patient/family have participated as able and agree with findings and recommendations. ?         Patient is unable to participate in plan of care at this time. ?          Other:        Shawn Bragg   Time Calculation: 26 mins

## 2019-06-02 NOTE — PROGRESS NOTES
2000- Assumed care of pt after bedside report from [place], RN   Pt sitting on recliner at bedside resting no concerns voiced. Pt denies pain. Assessment completed, pt noted to have edema +4 to Bilateral lower extremities. Neurovascular done. Plan of care for the shift explained pt verbalized understanding. Call light and personal items for use within reach. Pt instructed to call for assisistance whenever she is ready to go back to bed. Pt verbalized understanding. 2230- Pt back to bed resting watching tv.    0120- Pt sleeping. 5996- Pt assisted out of bed ambulated to bathroom independently and then back to bed made comfortable. 0600- Weighing done as ordered. Pt's weight 317.6lbs. Pt sitting on the recliner at bedside no concerns voiced.

## 2019-06-02 NOTE — ROUTINE PROCESS
0709-Bedside and Verbal shift change report given to BRET Poole (oncoming nurse) by Fili Acharya RN (offgoing nurse). Report included the following information SBAR, Kardex, Intake/Output, MAR and Recent Results.

## 2019-06-02 NOTE — PROGRESS NOTES
Problem: Chronic Obstructive Pulmonary Disease (COPD)  Goal: *Oxygen saturation during activity within specified parameters  Outcome: Progressing Towards Goal  Goal: *Able to remain out of bed as prescribed  Outcome: Progressing Towards Goal  Goal: *Absence of hypoxia  Outcome: Progressing Towards Goal  Goal: *Optimize nutritional status  Outcome: Progressing Towards Goal

## 2019-06-02 NOTE — PROGRESS NOTES
Bedside shift change report given to Heather Peña RN  by Soren Aguirre RN. Report included the following information SBAR, Kardex, MAR, Recent Results, Med Rec Status and Cardiac Rhythm NSR.

## 2019-06-02 NOTE — PROGRESS NOTES
Assumed care of pt from Sister , RN pt awake in chair A&O x 4 , no distress noted and denies pain. Frequently used items and call bell within reach. Patient verbalized understanding to use call bell for any needs or assistance.

## 2019-06-02 NOTE — DISCHARGE SUMMARY
Discharge Summary      [facility] service    Patient ID:  Rossy Goldstein, 44 y.o., female  : 1970    Admit Date: 2019  Discharge Date:  2019  Length of stay: 3 day(s)    PCP:  Dee Parrish NP    Chief Complaint   Patient presents with    Leg Swelling       Discharge Diagnosis:     Hospital Problems  Date Reviewed: 2018          Codes Class Noted POA    Encephalopathy, hypertensive ICD-10-CM: I67.4  ICD-9-CM: 437.2  2019 Yes        HTN (hypertension) ICD-10-CM: I10  ICD-9-CM: 401.9  2019 Yes        COPD [facility]chronic obstructive [facility] disease) ([facility] ICD-10-CM: J44.9  ICD-9-CM: 400  2019 Yes        Hyperlipidemia ICD-10-CM: E78.5  ICD-9-CM: 272.4  2019 Yes        * (Principal) CVA (cerebral vascular accident) [facility]) ICD-10-CM: I63.9  ICD-9-CM: 434.91  2019 Yes        Hypertensive emergency ICD-10-CM: I16.1  ICD-9-CM: 401.9  2019 Yes              Discharge instructions:    #  Discharge Diet:            Diet: Cardiac Diet, Low fat, Low cholesterol and no added salt diet   # Discharge activity and restrictions: Activity as tolerated  check BP at PCP clinic in 2 days. keep log book for BP reading at home as discussed    # Follow-up appointments: Follow-up Information     Follow up With Specialties Details Why Contact Nguyen Prarish NP Nurse Practitioner In 1 week check BP at PCP clinic in 2 days. keep log book for BP reading at home as discussed [address]  [phone]              HPI on Admission (per admitting physician):  Rossy Goldstein is a 44 y.o. female who presented to the ER along with her daughter because she \"wasn't feeling right. \"  Overall she was weak and was having increased difficulty with mobilization. She also had confusion. She feels she thinks this has been going on for about 1 week. She does not have difficulty with speech or with facial droop.   In the ER there is concern for CVA acutely, but she is not in the tPA window. She has Hypertensive Emergency and will be admitted to the ICU. For further details and initial management please refer to H/P. Hospital Course:      Patient feel good, back to her normal mentation and willing to go home. Baby ASA started, need to follow with her PCP. They key here is to control her BP, discussed with patient in details. Verbalized understanding and she will follow and monitor her BP and its control more. By Problems :     # Acute Hypertensive and Metabolic encephalopathy ,( hypertensive and CKD ),  resulted in Acute delirious state . Admitted with possible   CVA   . MRI negative for CVA. Neuro-observation. control  Hypertension. PT/OT/SLP/CM-  ASA , Statin . Neurology consulted, see notes. Per daughter , she has old CVA 1 and some sequelae from it with leg weakness, some mental disturbances and speech defects at times. transfered out ICU.     #   COPD (chronic obstructive pulmonary disease) ([facility] (5/30/2019). Stable     #   Hyperlipidemia (5/30/2019). Statin      # CKD3. Monitor labs. Avoid nephrotoxins. Renally dosing medications. Monitor urine out put.         #  Hypertensive emergency (5/30/2019). BP lowered  safely . Adjust meds as indicated.      #  HTN (hypertension) . Normally not well controlled per patient . Added Hydralazine po . Adjust regimen as indicated      # chronic Anemia.      # Mild hypokalemia. Replaced.      # Morbid  Obesity with Lymphedema both legs. TSH normal  - Body mass index is 55.25 kg/m².      Discharge condition:  improved and stable    Disposition:  Home    Procedures:   * No surgery found *      Consultants: Neurology    Physical Exam on Discharge:  Visit Vitals  BP (!) 151/92   Pulse 71   Temp 98.2 °F (36.8 °C)   Resp 20   Ht 5' 4\" (1.626 m)   Wt 144.1 kg (317 lb 9.6 oz)   SpO2 95%   BMI 54.52 kg/m²   Gen:    Well-developed, well-nourished, in no acute distress  HEENT: Head atraumatic, normocephalic , Pink conjunctivae,  hearing intact to voice, moist mucous membranes  Neck:  No apparent JVD, Supple, without masses, thyroid non-tender  Resp:  No accessory muscle use, Bilateral BS present,clear breath sounds without wheezes rales or rhonchi  Card:   No murmurs, normal S1, S2 without thrills, bruits or Gallop.  significant lower leg peripheral edema,Lymphedema both legs. Abd:  Soft, non-tender, not distended, normoactive bowel sounds are present   Musc:  No cyanosis or clubbing  Skin:   No rashes or ulcers, skin turgor is good   Neuro:  Cranial nerves are grossly intact, no clear area of focal motor weakness, follows commands appropriately  Psych:    oriented to person, place and time, alert. Hospitalization and discharge instructions d/c in details with : patient , family , Nursing/CM     Discharge medications :  Current Discharge Medication List      START taking these medications    Details   hydrALAZINE (APRESOLINE) 25 mg tablet Take 1 Tab by mouth three (3) times daily for 30 days. Qty: 90 Tab, Refills: 0         CONTINUE these medications which have CHANGED    Details   labetalol (NORMODYNE) 300 mg tablet Take 1 Tab by mouth two (2) times a day for 30 days. Qty: 60 Tab, Refills: 0         CONTINUE these medications which have NOT CHANGED    Details   SYMBICORT 160-4.5 mcg/actuation HFAA INHALE 2 PUFFS BY MOUTH TWICE A DAY  Qty: 10.2 Inhaler, Refills: 5      amLODIPine (NORVASC) 10 mg tablet Take 1 Tab by mouth daily. Qty: 30 Tab, Refills: 0      acetaminophen (TYLENOL) 325 mg tablet Take 2 Tabs by mouth every four (4) hours as needed for Pain. Qty: 20 Tab, Refills: 0      cyclobenzaprine (FLEXERIL) 5 mg tablet Take 1 Tab by mouth three (3) times daily as needed for Muscle Spasm(s). Qty: 12 Tab, Refills: 0      MULTIVITAMIN PO Take  by mouth. acyclovir (ZOVIRAX) 400 mg tablet Take 400 mg by mouth daily. ferrous sulfate 324 mg (65 mg iron) tablet Take 324 mg by mouth Daily (before breakfast). venlafaxine-SR (EFFEXOR-XR) 150 mg capsule Take  by mouth daily. simvastatin (ZOCOR) 20 mg tablet Take 20 mg by mouth nightly. Most Recent Labs:  Recent Results (from the past 24 hour(s))   CBC WITH AUTOMATED DIFF    Collection Time: 06/02/19  4:15 AM   Result Value Ref Range    WBC 4.7 4.6 - 13.2 K/uL    RBC 3.92 (L) 4.20 - 5.30 M/uL    HGB 7.5 (L) 12.0 - 16.0 g/dL    HCT 26.5 (L) 35.0 - 45.0 %    MCV 67.6 (L) 74.0 - 97.0 FL    MCH 19.1 (L) 24.0 - 34.0 PG    MCHC 28.3 (L) 31.0 - 37.0 g/dL    RDW 18.3 (H) 11.6 - 14.5 %    PLATELET 801 959 - 526 K/uL    NEUTROPHILS 62 40 - 73 %    LYMPHOCYTES 18 (L) 21 - 52 %    MONOCYTES 14 (H) 3 - 10 %    EOSINOPHILS 6 (H) 0 - 5 %    BASOPHILS 0 0 - 2 %    ABS. NEUTROPHILS 2.9 1.8 - 8.0 K/UL    ABS. LYMPHOCYTES 0.9 0.9 - 3.6 K/UL    ABS. MONOCYTES 0.7 0.05 - 1.2 K/UL    ABS. EOSINOPHILS 0.3 0.0 - 0.4 K/UL    ABS. BASOPHILS 0.0 0.0 - 0.1 K/UL    DF AUTOMATED     METABOLIC PANEL, BASIC    Collection Time: 06/02/19  4:15 AM   Result Value Ref Range    Sodium 142 136 - 145 mmol/L    Potassium 3.6 3.5 - 5.5 mmol/L    Chloride 103 100 - 108 mmol/L    CO2 32 21 - 32 mmol/L    Anion gap 7 3.0 - 18 mmol/L    Glucose 76 74 - 99 mg/dL    BUN 19 (H) 7.0 - 18 MG/DL    Creatinine 1.79 (H) 0.6 - 1.3 MG/DL    BUN/Creatinine ratio 11 (L) 12 - 20      GFR est AA 36 (L) >60 ml/min/1.73m2    GFR est non-AA 30 (L) >60 ml/min/1.73m2    Calcium 9.2 8.5 - 10.1 MG/DL   PHOSPHORUS    Collection Time: 06/02/19  4:15 AM   Result Value Ref Range    Phosphorus 3.7 2.5 - 4.9 MG/DL   MAGNESIUM    Collection Time: 06/02/19  4:15 AM   Result Value Ref Range    Magnesium 2.1 1.6 - 2.6 mg/dL         XR Results:  Results from Hospital Encounter encounter on 05/30/19   XR CHEST PORT    Narrative A portable AP radiograph of the chest was obtained at 1716 hours:  INDICATION:  Shortness of breath. COMPARISON:  Multiple prior studies most recent being 7/28/2018.     FINDINGS:      Heart and mediastinum: Enlarged cardiac silhouette. Calcified hilar nodes. Lungs and pleura: Clear without consolidation or pleural effusion. Aorta: Unremarkable. Bones: Within normal limits for age. Other: None. Impression Impression:    Cardiomegaly without overt CHF or pneumonia stable since the prior study. Calcified hilar lymph nodes may be due to sarcoidosis. CT Results:  Results from [place] encounter on 05/30/19   CT HEAD WO CONT    Narrative EXAM: CT HEAD WO CONT    CLINICAL INDICATION/HISTORY: increased confusion    COMPARISON: None. TECHNIQUE: Axial CT imaging of the head was performed without intravenous  contrast. Sagittal and coronal reconstructions are provided. One or more dose  reduction techniques were used on this CT: automated exposure control,  adjustment of the mAs and/or kVp according to patient size, and iterative  reconstruction techniques. The specific techniques used on this CT exam have  been documented in the patient's electronic medical record. Digital Imaging and  Communications in Medicine (DICOM) format image data are available to  nonaffiliated external healthcare facilities or entities on a secure, media  free, reciprocally searchable basis with patient authorization for at least a  12-month period after this study      _______________    FINDINGS:    BRAIN AND POSTERIOR FOSSA: The sulci, folia, ventricles and basal cisterns are  within normal limits for the patient's age. There is no intracranial hemorrhage,  mass effect, or midline shift. There are moderate patchy and confluent foci of  decreased attenuation in the periventricular white matter which are nonspecific  but most likely sequelae of chronic microvascular disease. Age-indeterminate  subacute or chronic thalamic infarcts, particularly on the right on axial image  14.  Ovoid hypodensity more inferiorly adjacent to the right basal ganglia  suggesting sequela of lacunar infarct versus dilated perivascular space. EXTRA-AXIAL SPACES AND MENINGES: There are no abnormal extra-axial fluid  collections. CALVARIUM: Intact. SINUSES: Clear. OTHER: None.    _______________      Impression IMPRESSION:      1. Age-indeterminate subacute or chronic thalamic infarcts. Consider follow-up  MRI for further evaluation. 2. Additional findings presumably related to sequela of chronic microvascular  ischemic change. Initial preliminary report was provided to the ED by the on-call radiology  resident. MRI Results:  Results from [place] encounter on 05/30/19   MRI BRAIN WO CONT    Narrative EXAM: MRI BRAIN W/O CONTRAST    INDICATION: Confusion, difficulty with mobilization    COMPARISON: No prior study available in the [facility], on line  review of outside [facility] noncontrast MR brain 1/26/2014    TECHNIQUE: Multiplanar multi sequence MR imaging of the brain was performed  utilizing axial T2, FLAIR, diffusion, coronal T2, and multiplanar T1 pre  contrast imaging . Additional dedicated susceptibility weighted imaging was  performed including MIP and filtered phase reconstruction images. No gadolinium  was administered due to specific clinician request.  Imaging performed on wide  bore Discovery [id number] GEM suite 3T magnet at [facility].     _______________________    FINDINGS:     VENTRICLES/EXTRA-AXIAL SPACES: The ventricles and sulci are normal in their size  and configuration. BRAIN PARENCHYMA: Moderate amount of T2/FLAIR hyperintense white matter lesions  are seen within the periventricular and subcortical white matter , including  patchy abnormal signal in the brainstem, which are nonspecific, but likely  represent chronic small vessel changes. Some of these T2/FLAIR changes do  include some patchy frontal lobe involvement laterally towards the vertex with  prominent subcortical involvement and questionable cortical involvement.  These  white matter changes overall have progressed since 2014, particularly with the  subcortical white matter involvement. Multiple foci of susceptibility artifact are present, mostly located within the  bilateral basal ganglia. -These include a focal rounded lesion along the right periventricular white  matter at the superior margin of the basal ganglia, susceptibility weighted  image 65. This lesion also appears to be associated with 2 adjacent curvilinear  vessels, as seen on susceptibility weighted imaging.  -Additional more confluent/vague susceptibility artifact is seen along the  superior margin of the left thalamus, adjacent to a chronic thalamic infarct. Additional vague surrounding T2/FLAIR hyperintensity is present. Curvilinear  abnormal susceptibility artifact was also seen along the left thalamus on the  2014 study, which was associated with prominent adjacent vague thalamic  parenchymal T2/FLAIR hyperintensity, and focal CT hyperdensity representing  acute hemorrhage.   -Several additional more punctate susceptibility foci are seen throughout the  bilateral basal ganglia, especially thalami.  -Small left-sided focus is present posterior left temporal lobe as well.  -Of note, almost all of these foci of susceptibility artifact appear to be new  in comparison to the 2014 study with exception of the left thalamic curvilinear  abnormal signal. No convincing hyperdense acute hemorrhage is seen on review of  recent 5/30/2019 CT. Chronic posterior medial right thalamic lacunar infarct is suggested in addition  to the posterior left thalamic infarct described above. Discrete foci of central fluid signal with surrounding FLAIR hyperintensity are  seen along the right putamen and extending anteriorly into the anterior limb  internal capsule and caudate nucleus representing chronic small infarcts. No evidence of intracranial mass effect, midline shift, or herniation.       No abnormal restricted diffusion to suggest acute infarct. VASCULATURE:  The major intracranial vascular flow voids are grossly normal. No  definite abnormal signal or susceptibility artifact is identified along the  internal cerebral veins or straight sinus. ORBITS: The visualized orbits are unremarkable. PARANASAL SINUSES: Visualized paranasal sinuses are clear. MASTOID AIR CELLS: Minimal  fluid signal is present in the bilateral mastoid air  cells, nonspecific but likely inflammatory. Visualized posterior nasopharynx is  unremarkable. OSSEOUS STRUCTURES: Unremarkable    OTHER: None.      ________________________      Impression IMPRESSION:    1. No acute infarct, mass effect, or herniation. 2. Curvilinear slightly vague susceptibility artifact involving the posterior  and superomedial aspect of the left thalamus with confluent T2/FLAIR  hyperintensity, which appears to represent sequela of intraparenchymal left  thalamic hemorrhage seen on outside 1/26 2014 study. Underlying left thalamic  chronic infarct suggested. 3. New confluent T2/FLAIR hyperintensity in the contralateral right thalamus,  associated with clustered small foci of susceptibility artifact, new versus 2014  study. Suspect these findings represent foci of chronic microhemorrhage, often  hypertensive in etiology. Given the surrounding T2/FLAIR hyperintensity, these  hemorrhages may be more recent, however no T1 signal abnormality or CT  hyperdensity is seen.  -Atypical inflammatory type response to microhemorrhage (akin to cerebral  amyloid angiopathy-related inflammation, CAA-I) is possible especially given the  multiple additional susceptibility foci.  -Small foci of hemorrhage related to atypical posterior reversible  encephalopathy syndrome/hypertensive encephalopathy is possible given the  additional patchy bilateral frontal lobe subcortical/?cortical signal  abnormality as well.     4. Discrete lesion with susceptibility artifact along the right posterior  periventricular white matter and superior margin basal ganglia suspicious for  hemosiderin staining from prior hemorrhage. A cavernous hemangioma is possible,  perhaps with adjacent developmental venous anomaly given suspected adjacent  vascularity; these two malformations may occur together. Hemorrhage related to  underlying small AVM is possible.  -Suggest follow-up MRA head and MR brain imaging to include postcontrast imaging  (as well as repeat DWI, susceptibility weighted imaging, FLAIR, and noncontrast  T1 imaging). 5. Moderate nonspecific white matter disease likely representing chronic small  vessel changes. White matter disease has progressed since 2014. Some of these  white matter changes are particularly subcortical in the frontal lobe towards  the vertex with questionable cortical involvement, as described in #3. 6. Chronic small infarcts involving the right lentiform nucleus and caudate  nucleus. Nuclear Medicine Results:  No results found for this or any previous visit. US Results:  Results from [place] encounter on 10/03/18   US EXT NONVAS LT LTD    Narrative Examination: Ultrasound of the left lower extremity, limited. HISTORY: Left leg pain. Cellulitis of the left lower extremity. TECHNIQUE: Real-time grayscale and color Doppler sonographic images over the  region of clinical concern were performed by the technologist with  representative images saved to PACS. COMPARISON: Correlation is made with CT scan of the left tibia and fibula  performed 10/3/2018. FINDINGS:    Sonographic images obtained from the anterior and distal aspect of the left  lower extremity posterior extensive skin thickening and reticulation of  subcutaneous fat tissues. No discrete organized fluid collection is  demonstrated. Several superficial venous varicosities are demonstrated, similar  to prior CT examination. Impression IMPRESSION:  1.  Considerable skin thickening and subcutaneous edema in keeping with  underlying cellulitis. No organized or drainable fluid collection present on the  provided images. IR Results:  Results from [place] encounter on 03/05/14   IR UTERINE FIBROID EMBOLIZATION    Narrative PROCEDURE(S):  1. Uterine artery embolization    INDICATION: 25-year-old female. History of symptomatic fibroid uterus. Uterine  fibroid embolization requested. TECHNIQUE: Expected benefits, potential risks, and alternatives to the  procedure were discussed with the patient and all questions were answered. Discussed risks include - but are not limited to - bleeding, infection,  vascular injury, thromboembolic events, radiation exposure, medication  reaction, contrast induced nephropathy, idiosyncratic contrast reaction,  postembolization syndrome, fibroid passage, iatrogenic menopause, the  possibility of hysterectomy, and missed malignancy. Informed consent was  obtained. The patient was placed on the procedure table, the bilateral groins  were prepared in usual sterile fashion, and the ensuing procedure was performed  with full barrier precaution, including caps, masks, gowns, gloves, and drapes. Procedure verification was completed. Moderate sedation was administered by  qualified nursing personnel, who also monitored the patient throughout the  procedure. Upon physician review, a patent vessel was not able to be identified. Consequently, ultrasound evaluation of potential access sites was performed. After successfully identifying a patent vessel, intravascular access was  achieved under real-time ultrasound guidance. The needle was visualized  entering the vessel. An image was permanently recorded and archived in PACS. A  5 [place] angiographic sheath was placed. In similar fashion, access was then  established on the contralateral side.  From the right side, a 5 [place] glide  Cobra catheter was advanced over the aortoiliac bifurcation into the left  internal iliac artery. Using coaxial technique, a microcatheter was advanced  into the left uterine artery. In similar fashion, a microcatheter was placed in  the contralateral uterine artery. Contrast was injected for preprocedure uterine angiography. Simultaneous  transcatheter embolization was then performed under continuous fluoroscopic  observation. In total, 2 vial(s) of 500-700 micron Embospheres were used. Following embolization, contrast was again injected bilaterally for  postprocedure uterine angiography. Preservative-free lidocaine was administered  intra-arterially. The catheters were removed, both sheaths were removed, and hemostasis was  achieved using Mynx arterial closure devices. Sterile dressings were applied. ACCESS: Bilateral common femoral arteries    FINDINGS:  1. Enlarged tortuous bilateral uterine arteries supplying fibroid uterus  2. Adequate stasis of contrast following embolization    : Cody Arenas MD    ASSISTANT(S): None    MEDICATION(S): Lidocaine, fentanyl, midazolam, cefazolin, keterolac, dilaudid    SEDATION TIME: 90 minutes    CONTRAST: 50 mL iodinated contrast; C02    FLUOROSCOPY: 22.5 minutes    SPECIMEN: None    ESTIMATED BLOOD LOSS: Minimal    BLOOD ADMINISTERED: None    DRAIN(S): None    IMPLANT(S): None    COMPLICATION(S): None      Impression IMPRESSION:  Technically successful uterine fibroid embolization. Total discharge time 38 minutes. Toño Boo MD  Hospitalist  [facility], [facility]. medical group. Hospitalist Division.   June 2, 2019  12:15 PM

## 2019-06-02 NOTE — PROGRESS NOTES
Problem: Falls - Risk of  Goal: *Absence of Falls  Description  Document Payton Latin Fall Risk and appropriate interventions in the flowsheet. Outcome: Progressing Towards Goal     Problem: Pain  Goal: *Control of Pain  Outcome: Progressing Towards Goal     Problem: Discharge Planning  Goal: *Discharge to safe environment  Outcome: Progressing Towards Goal     Problem: Pressure Injury - Risk of  Goal: *Prevention of pressure injury  Description  Document Aureliano Scale and appropriate interventions in the flowsheet.   Outcome: Progressing Towards Goal     Problem: Hypertension  Goal: *Blood pressure within specified parameters  Outcome: Progressing Towards Goal  Goal: *Fluid volume balance  Outcome: Progressing Towards Goal  Goal: *Labs within defined limits  Outcome: Progressing Towards Goal

## 2019-06-02 NOTE — DISCHARGE INSTRUCTIONS
DISCHARGE SUMMARY from Nurse    PATIENT INSTRUCTIONS:    After general anesthesia or intravenous sedation, for 24 hours or while taking prescription Narcotics:  · Limit your activities  · Do not drive and operate hazardous machinery  · Do not make important personal or business decisions  · Do  not drink alcoholic beverages  · If you have not urinated within 8 hours after discharge, please contact your surgeon on call. Report the following to your surgeon:  · Excessive pain, swelling, redness or odor of or around the surgical area  · Temperature over 100.5  · Nausea and vomiting lasting longer than 4 hours or if unable to take medications  · Any signs of decreased circulation or nerve impairment to extremity: change in color, persistent  numbness, tingling, coldness or increase pain  · Any questions    What to do at Home:  Recommended activity: Activity as tolerated,     If you experience any of the following symptoms as listed under \" When should I call for help? \" in your discharge teaching  , please follow up with your Doctor and/ or call 911. *  Please give a list of your current medications to your Primary Care Provider. *  Please update this list whenever your medications are discontinued, doses are      changed, or new medications (including over-the-counter products) are added. *  Please carry medication information at all times in case of emergency situations. These are general instructions for a healthy lifestyle:    No smoking/ No tobacco products/ Avoid exposure to second hand smoke  Surgeon General's Warning:  Quitting smoking now greatly reduces serious risk to your health.     Obesity, smoking, and sedentary lifestyle greatly increases your risk for illness    A healthy diet, regular physical exercise & weight monitoring are important for maintaining a healthy lifestyle    You may be retaining fluid if you have a history of heart failure or if you experience any of the following symptoms:  Weight gain of 3 pounds or more overnight or 5 pounds in a week, increased swelling in our hands or feet or shortness of breath while lying flat in bed. Please call your doctor as soon as you notice any of these symptoms; do not wait until your next office visit. Recognize signs and symptoms of STROKE:    F-face looks uneven    A-arms unable to move or move unevenly    S-speech slurred or non-existent    T-time-call 911 as soon as signs and symptoms begin-DO NOT go       Back to bed or wait to see if you get better-TIME IS BRAIN. Warning Signs of HEART ATTACK     Call 911 if you have these symptoms:   Chest discomfort. Most heart attacks involve discomfort in the center of the chest that lasts more than a few minutes, or that goes away and comes back. It can feel like uncomfortable pressure, squeezing, fullness, or pain.  Discomfort in other areas of the upper body. Symptoms can include pain or discomfort in one or both arms, the back, neck, jaw, or stomach.  Shortness of breath with or without chest discomfort.  Other signs may include breaking out in a cold sweat, nausea, or lightheadedness. Don't wait more than five minutes to call 911 - MINUTES MATTER! Fast action can save your life. Calling 911 is almost always the fastest way to get lifesaving treatment. Emergency Medical Services staff can begin treatment when they arrive -- up to an hour sooner than if someone gets to the hospital by car. The discharge information has been reviewed with the patient. The patient verbalized understanding. Discharge medications reviewed with the patient and appropriate educational materials and side effects teaching were provided.   ___________________________________________________________________________________________________________________________________    HOSPITALIST DISCHARGE INSTRUCTIONS  NAME: Teresa Green   :  1970   MRN:  [id number]     Date/Time:  2019 12:25 PM    ADMIT DATE: 5/30/2019     DISCHARGE DATE: 6/2/2019     DISCHARGE DIAGNOSIS:    Encephalopathy , hypertensive and Metabolic. HTN uncontrolled  Obesity. MEDICATIONS:       · It is important that you take the medication exactly as they are prescribed. · Keep your medication in the bottles provided by the pharmacist and keep a list of the medication names, dosages, and times to be taken in your wallet. · Do not take other medications without consulting your doctor. Pain Management: per above medications    What to do at Home    #  Discharge Diet:            Diet: Cardiac Diet, Low fat, Low cholesterol and no added salt diet   # Discharge activity and restrictions: Activity as tolerated  check BP at PCP clinic in 2 days. keep log book for BP reading at home as discussed    If you experience any of the following symptoms then please call your primary care physician or return to the emergency room if you cannot get hold of your doctor:  Fever, chills, nausea, vomiting, diarrhea, change in mentation, falling, bleeding, shortness of breath. Any new or worsening symptoms. Or call 911 for emergency. Follow Up:  Dr. Beni King, NP  you are to call and set up an appointment to see them in 7 days. Information obtained by :  I understand that if any problems occur once I am at home I am to contact my physician. I understand and acknowledge receipt of the instructions indicated above.                                                                                                                                            Physician's or R.N.'s Signature                                                                  Date/Time                                                                                                                                              Patient or Representative Signature                                                          Date/Time        Patient Education        Stroke: Care Instructions  Your Care Instructions    You have had a stroke. This means that the blood flow to a part of your brain was blocked for some time, which damages the nerve cells in that part of the brain. The part of your body controlled by that part of your brain may not function properly now. The brain is an amazing organ that can heal itself to some degree. The stroke you had damaged part of your brain. But other parts of your brain may take over in some way for the damaged areas. You have already started this process. Your doctor will talk with you about what you can do to prevent another stroke. High blood pressure, high cholesterol, and diabetes are all risk factors for stroke. If you have any of these conditions, work with your doctor to make sure they are under control. Other risk factors for stroke include being overweight, smoking, and not getting regular exercise. Going home may be hard for you and your family. The more you can try to do for yourself, the better. Remember to take each day one at a time. Follow-up care is a key part of your treatment and safety. Be sure to make and go to all appointments, and call your doctor if you are having problems. It's also a good idea to know your test results and keep a list of the medicines you take. How can you care for yourself at home?    · Enter a stroke rehabilitation (rehab) program, if your doctor recommends it. Physical, speech, and occupational therapies can help you manage bathing, dressing, eating, and other basics of daily living.     · Do not drive until your doctor says it is okay.     · It is normal to feel sad or depressed after a stroke. If these feelings last, talk to your doctor.     · If you are having problems with urine leakage, go to the bathroom at regular times, including when you first wake up and at bedtime.  Also, limit fluids after dinner.     · If you are constipated, drink plenty of fluids, enough so that your urine is light yellow or clear like water. If you have kidney, heart, or liver disease and have to limit fluids, talk with your doctor before you increase the amount of fluids you drink. Set up a regular time for using the toilet. If you continue to have constipation, your doctor may suggest using a bulking agent, such as Metamucil, or a stool softener, laxative, or enema. Medicines    · Take your medicines exactly as prescribed. Call your doctor if you think you are having a problem with your medicine. You may be taking several medicines. ACE (angiotensin-converting enzyme) inhibitors, angiotensin II receptor blockers (ARBs), beta-blockers, diuretics (water pills), and calcium channel blockers control your blood pressure. Statins help lower cholesterol. Your doctor may also prescribe medicines for depression, pain, sleep problems, anxiety, or agitation.     · If your doctor has given you a blood thinner to prevent another stroke, be sure you get instructions about how to take your medicine safely. Blood thinners can cause serious bleeding problems.     · Do not take any over-the-counter medicines or herbal products without talking to your doctor first.     · If you take birth control pills or hormone therapy, talk to your doctor about whether they are right for you.    For family members and caregivers    · Make the home safe. Set up a room so that your loved one does not have to climb stairs. Be sure the bathroom is on the same floor. Move throw rugs and furniture that could cause falls. Make sure that the lighting is good. Put grab bars and seats in tubs and showers.     · Find out what your loved one can do and what he or she needs help with. Try not to do things for your loved one that your loved one can do on his or her own. Help him or her learn and practice new skills.     · Visit and talk with your loved one often. Try doing activities together that you both enjoy, such as playing cards or board games.  Keep in touch with your loved one's friends as much as you can. Encourage them to visit.     · Take care of yourself. Do not try to do everything yourself. Ask other family members to help. Eat well, get enough rest, and take time to do things that you enjoy. Keep up with your own doctor visits, and make sure to take your medicines regularly. Get out of the house as much as you can. Join a local support group. Find out if you qualify for home health care visits to help with rehab or for adult day care. When should you call for help? Call 911 anytime you think you may need emergency care. For example, call if:    · You have signs of another stroke. These may include:  ? Sudden numbness, tingling, weakness, or loss of movement in your face, arm, or leg, especially on only one side of your body. ? Sudden vision changes. ? Sudden trouble speaking. ? Sudden confusion or trouble understanding simple statements. ? Sudden problems with walking or balance. ? A sudden, severe headache that is different from past headaches. Call 911 even if these symptoms go away in a few minutes.    Call your doctor now or seek immediate medical care if:    · You have new symptoms that may be related to your stroke, such as falls or trouble swallowing.    Watch closely for changes in your health, and be sure to contact your doctor if you have any problems. Where can you learn more? Go to http://marty-chente.info/. Enter T987 in the search box to learn more about \"Stroke: Care Instructions. \"  Current as of: September 26, 2018  Content Version: 11.9  © 7256-7549 Health[employer], Incorporated. Care instructions adapted under license by [employer] (which disclaims liability or warranty for this information). If you have questions about a medical condition or this instruction, always ask your healthcare professional. [address] any warranty or liability for your use of this information. Patient Education        Learning About How to Prevent Another Stroke  What can you do to prevent another stroke? After a stroke, people feel lots of different emotions. Some people are worried that they could have another stroke. Or they may feel overwhelmed by how much there is to learn and do. Some people feel sad or depressed. No matter what emotions you are feeling, you can give yourself some control and peace of mind by making a plan to lower your risk of having another stroke. Take your medicines  You'll need to take medicines to help prevent another stroke. Be sure to take your medicines exactly as prescribed. And don't stop taking them unless your doctor tells you to. If you stop taking your medicines, you can increase your risk of having another stroke. Some of the medicines your doctor may prescribe include:  · Aspirin or some other blood thinner to prevent blood clots. · Statins to lower cholesterol. · Blood pressure medicines to lower blood pressure. Manage other health problems  You can help lower your chance of having another stroke by managing certain other health problems. Problems that increase your risk of having another stroke include:  · High blood pressure. · High cholesterol. · Atrial fibrillation. · Diabetes. If you have any of these health problems, you can manage them with healthy lifestyle changes along with medicine. Adopt a healthy lifestyle  · Do not smoke or allow others to smoke around you. If you need help quitting, talk to your doctor about stop-smoking programs and medicines. These can increase your chances of quitting for good. Smoking makes a stroke more likely. · Limit alcohol to 2 drinks a day for men and 1 drink a day for women. · Lose weight if you need to. Controlling your weight will help you keep your heart and body healthy. · Be active. Ask your doctor what type and level of activity is safe for you.   · Eat heart-healthy foods, like fruits, vegetables, and high-fiber foods. It's also important to:  · Get a flu shot every year. · Ask for help if you think you are depressed. Do stroke rehab  Taking part in a stroke rehabilitation (rehab) program will help you to regain skills you lost or make the most of your abilities after a stroke. It also helps you take steps to prevent another stroke. Your rehab team will give you education and support to help you build new, healthy habits. You'll learn how to manage any other health problems that you might have. Pia Ramon also learn how to exercise safely, eat a healthy diet, and quit smoking if you smoke. Pia Ramon work with your team to decide what lifestyle choices are best for you. If your doctor hasn't already suggested it, ask him or her if stroke rehab is right for you. Know stroke symptoms  Make sure you know the symptoms of stroke. FAST is a simple way to remember. Recognizing these symptoms helps you know when to call for medical help. FAST stands for:  · Face drooping. · Arm weakness. · Speech difficulty. · Time to call 911. Follow-up care is a key part of your treatment and safety. Be sure to make and go to all appointments, and call your doctor if you are having problems. It's also a good idea to know your test results and keep a list of the medicines you take. Where can you learn more? Go to http://marty-chente.info/. Enter G851 in the search box to learn more about \"Learning About How to Prevent Another Stroke. \"  Current as of: September 26, 2018  Content Version: 11.9  © 3130-6639 [employer]. Care instructions adapted under license by [employer] (which disclaims liability or warranty for this information). If you have questions about a medical condition or this instruction, always ask your healthcare professional. [address] any warranty or liability for your use of this information.

## 2019-09-01 ENCOUNTER — HOSPITAL ENCOUNTER (EMERGENCY)
Age: 49
Discharge: HOME | End: 2019-09-02
Attending: EMERGENCY MEDICINE | Admitting: EMERGENCY MEDICINE
Payer: MEDICAID

## 2019-09-01 DIAGNOSIS — D64.9 CHRONIC ANEMIA: ICD-10-CM

## 2019-09-01 DIAGNOSIS — I10 ESSENTIAL HYPERTENSION: Primary | ICD-10-CM

## 2019-09-01 DIAGNOSIS — R51.9 ACUTE NONINTRACTABLE HEADACHE, UNSPECIFIED HEADACHE TYPE: ICD-10-CM

## 2019-09-01 PROCEDURE — 99284 EMERGENCY DEPT VISIT MOD MDM: CPT

## 2019-09-02 ENCOUNTER — APPOINTMENT (OUTPATIENT)
Dept: CT IMAGING | Age: 49
End: 2019-09-02
Attending: PHYSICIAN ASSISTANT
Payer: MEDICAID

## 2019-09-02 VITALS
TEMPERATURE: 99.8 F | SYSTOLIC BLOOD PRESSURE: 184 MMHG | BODY MASS INDEX: 47.09 KG/M2 | WEIGHT: 293 LBS | HEIGHT: 66 IN | RESPIRATION RATE: 17 BRPM | OXYGEN SATURATION: 95 % | DIASTOLIC BLOOD PRESSURE: 113 MMHG | HEART RATE: 90 BPM

## 2019-09-02 LAB
ALBUMIN SERPL-MCNC: 2.8 G/DL (ref 3.4–5)
ALBUMIN/GLOB SERPL: 0.6 {RATIO} (ref 0.8–1.7)
ALP SERPL-CCNC: 69 U/L (ref 45–117)
ALT SERPL-CCNC: 11 U/L (ref 13–56)
ANION GAP SERPL CALC-SCNC: 3 MMOL/L (ref 3–18)
AST SERPL-CCNC: 18 U/L (ref 10–38)
BASOPHILS # BLD: 0 K/UL (ref 0–0.1)
BASOPHILS NFR BLD: 0 % (ref 0–2)
BILIRUB SERPL-MCNC: 0.5 MG/DL (ref 0.2–1)
BUN SERPL-MCNC: 18 MG/DL (ref 7–18)
BUN/CREAT SERPL: 9 (ref 12–20)
CALCIUM SERPL-MCNC: 8.9 MG/DL (ref 8.5–10.1)
CHLORIDE SERPL-SCNC: 100 MMOL/L (ref 100–111)
CO2 SERPL-SCNC: 33 MMOL/L (ref 21–32)
CREAT SERPL-MCNC: 2.02 MG/DL (ref 0.6–1.3)
DIFFERENTIAL METHOD BLD: ABNORMAL
EOSINOPHIL # BLD: 0.2 K/UL (ref 0–0.4)
EOSINOPHIL NFR BLD: 2 % (ref 0–5)
ERYTHROCYTE [DISTWIDTH] IN BLOOD BY AUTOMATED COUNT: 21.9 % (ref 11.6–14.5)
GLOBULIN SER CALC-MCNC: 4.9 G/DL (ref 2–4)
GLUCOSE SERPL-MCNC: 103 MG/DL (ref 74–99)
HCT VFR BLD AUTO: 26.3 % (ref 35–45)
HGB BLD-MCNC: 7.6 G/DL (ref 12–16)
LIPASE SERPL-CCNC: 66 U/L (ref 73–393)
LYMPHOCYTES # BLD: 0.9 K/UL (ref 0.9–3.6)
LYMPHOCYTES NFR BLD: 10 % (ref 21–52)
MAGNESIUM SERPL-MCNC: 1.8 MG/DL (ref 1.6–2.6)
MCH RBC QN AUTO: 19.3 PG (ref 24–34)
MCHC RBC AUTO-ENTMCNC: 28.9 G/DL (ref 31–37)
MCV RBC AUTO: 66.8 FL (ref 74–97)
MONOCYTES # BLD: 0.8 K/UL (ref 0.05–1.2)
MONOCYTES NFR BLD: 9 % (ref 3–10)
NEUTS SEG # BLD: 6.7 K/UL (ref 1.8–8)
NEUTS SEG NFR BLD: 79 % (ref 40–73)
PLATELET # BLD AUTO: 181 K/UL (ref 135–420)
PLATELET COMMENTS,PCOM: ABNORMAL
POTASSIUM SERPL-SCNC: 3.5 MMOL/L (ref 3.5–5.5)
PROT SERPL-MCNC: 7.7 G/DL (ref 6.4–8.2)
RBC # BLD AUTO: 3.94 M/UL (ref 4.2–5.3)
RBC MORPH BLD: ABNORMAL
SODIUM SERPL-SCNC: 136 MMOL/L (ref 136–145)
WBC # BLD AUTO: 8.6 K/UL (ref 4.6–13.2)

## 2019-09-02 PROCEDURE — 83735 ASSAY OF MAGNESIUM: CPT

## 2019-09-02 PROCEDURE — 80053 COMPREHEN METABOLIC PANEL: CPT

## 2019-09-02 PROCEDURE — 74011250637 HC RX REV CODE- 250/637: Performed by: PHYSICIAN ASSISTANT

## 2019-09-02 PROCEDURE — 83690 ASSAY OF LIPASE: CPT

## 2019-09-02 PROCEDURE — 85025 COMPLETE CBC W/AUTO DIFF WBC: CPT

## 2019-09-02 PROCEDURE — 70450 CT HEAD/BRAIN W/O DYE: CPT

## 2019-09-02 RX ORDER — LABETALOL 100 MG/1
100 TABLET, FILM COATED ORAL
Status: COMPLETED | OUTPATIENT
Start: 2019-09-02 | End: 2019-09-02

## 2019-09-02 RX ORDER — ACETAMINOPHEN 500 MG
1000 TABLET ORAL
Status: COMPLETED | OUTPATIENT
Start: 2019-09-02 | End: 2019-09-02

## 2019-09-02 RX ADMIN — LABETALOL HYDROCHLORIDE 100 MG: 100 TABLET, FILM COATED ORAL at 01:38

## 2019-09-02 RX ADMIN — ACETAMINOPHEN 1000 MG: 500 TABLET, FILM COATED ORAL at 01:38

## 2019-09-02 NOTE — DISCHARGE INSTRUCTIONS
Patient Education        Anemia: Care Instructions  Your Care Instructions    Anemia is a low level of red blood cells, which carry oxygen throughout your body. Many things can cause anemia. Lack of iron is one of the most common causes. Your body needs iron to make hemoglobin, a substance in red blood cells that carries oxygen from the lungs to your body's cells. Without enough iron, the body produces fewer and smaller red blood cells. As a result, your body's cells do not get enough oxygen, and you feel tired and weak. And you may have trouble concentrating. Bleeding is the most common cause of a lack of iron. You may have heavy menstrual bleeding or bleeding caused by conditions such as ulcers, hemorrhoids, or cancer. Regular use of aspirin or other anti-inflammatory medicines (such as ibuprofen) also can cause bleeding in some people. A lack of iron in your diet also can cause anemia, especially at times when the body needs more iron, such as during pregnancy, infancy, and the teen years. Your doctor may have prescribed iron pills. It may take several months of treatment for your iron levels to return to normal. Your doctor also may suggest that you eat foods that are rich in iron, such as meat and beans. There are many other causes of anemia. It is not always due to a lack of iron. Finding the specific cause of your anemia will help your doctor find the right treatment for you. Follow-up care is a key part of your treatment and safety. Be sure to make and go to all appointments, and call your doctor if you are having problems. It's also a good idea to know your test results and keep a list of the medicines you take. How can you care for yourself at home? · Take your medicines exactly as prescribed. Call your doctor if you think you are having a problem with your medicine.   · If your doctor recommends iron pills, take them as directed:  ? Try to take the pills on an empty stomach about 1 hour before or 2 hours after meals. But you may need to take iron with food to avoid an upset stomach. ? Do not take antacids or drink milk or caffeine drinks (such as coffee, tea, or cola) at the same time or within 2 hours of the time that you take your iron. They can make it hard for your body to absorb the iron. ? Vitamin C (from food or supplements) helps your body absorb iron. Try taking iron pills with a glass of orange juice or some other food that is high in vitamin C, such as citrus fruits. ? Iron pills may cause stomach problems, such as heartburn, nausea, diarrhea, constipation, and cramps. Be sure to drink plenty of fluids, and include fruits, vegetables, and fiber in your diet each day. Iron pills often make your bowel movements dark or green. ? If you forget to take an iron pill, do not take a double dose of iron the next time you take a pill. ? Keep iron pills out of the reach of small children. An overdose of iron can be very dangerous. · Follow your doctor's advice about eating iron-rich foods. These include red meat, shellfish, poultry, eggs, beans, raisins, whole-grain bread, and leafy green vegetables. · Steam vegetables to help them keep their iron content. When should you call for help? Call 911 anytime you think you may need emergency care. For example, call if:    · You have symptoms of a heart attack. These may include:  ? Chest pain or pressure, or a strange feeling in the chest.  ? Sweating. ? Shortness of breath. ? Nausea or vomiting. ? Pain, pressure, or a strange feeling in the back, neck, jaw, or upper belly or in one or both shoulders or arms. ? Lightheadedness or sudden weakness. ? A fast or irregular heartbeat. After you call 911, the  may tell you to chew 1 adult-strength or 2 to 4 low-dose aspirin. Wait for an ambulance.  Do not try to drive yourself.     · You passed out (lost consciousness).    Call your doctor now or seek immediate medical care if:    · You have new or increased shortness of breath.     · You are dizzy or lightheaded, or you feel like you may faint.     · Your fatigue and weakness continue or get worse.     · You have any abnormal bleeding, such as:  ? Nosebleeds. ? Vaginal bleeding that is different (heavier, more frequent, at a different time of the month) than what you are used to.  ? Bloody or black stools, or rectal bleeding. ? Bloody or pink urine.    Watch closely for changes in your health, and be sure to contact your doctor if:    · You do not get better as expected. Where can you learn more? Go to http://marty-chente.info/. Enter R301 in the search box to learn more about \"Anemia: Care Instructions. \"  Current as of: March 28, 2019  Content Version: 12.1  © 7481-5408 [employer]. Care instructions adapted under license by [employer] (which disclaims liability or warranty for this information). If you have questions about a medical condition or this instruction, always ask your healthcare professional. [address] any warranty or liability for your use of this information. [employer] Activation    Thank you for requesting access to [employer]. Please follow the instructions below to securely access and download your online medical record. [employer] allows you to send messages to your doctor, view your test results, renew your prescriptions, schedule appointments, and more. How Do I Sign Up? 1. In your internet browser, go to [email or web address]  2. Click on the First Time User? Click Here link in the Sign In box. You will be redirect to the New Member Sign Up page. 3. Enter your [employer] Access Code exactly as it appears below. You will not need to use this code after youve completed the sign-up process. If you do not sign up before the expiration date, you must request a new code.     [employer] Access Code: [email or web address]  Expires: 10/16/2019  9:12 PM (This is the date your [employer] access code will )    4. Enter the last four digits of your Social Security Number (xxxx) and Date of Birth (mm/dd/yyyy) as indicated and click Submit. You will be taken to the next sign-up page. 5. Create a [employer] ID. This will be your [employer] login ID and cannot be changed, so think of one that is secure and easy to remember. 6. Create a [employer] password. You can change your password at any time. 7. Enter your Password Reset Question and Answer. This can be used at a later time if you forget your password. 8. Enter your e-mail address. You will receive e-mail notification when new information is available in [address] 9. Click Sign Up. You can now view and download portions of your medical record. 10. Click the Download Summary menu link to download a portable copy of your medical information. Additional Information    If you have questions, please visit the Frequently Asked Questions section of the [employer] website at [email or web address] Remember, [employer] is NOT to be used for urgent needs. For medical emergencies, dial 911. Continue all medications as prescribed. Follow-up with primary care doctor in 1 week. Return to the ED immediately for any new or worsening symptoms.

## 2019-09-02 NOTE — ED TRIAGE NOTES
Ambulatory to triage with c/o intermittent right-sided headache x1 month. \"My pain went down after taking Acetaminophen. \" Patient also c/o intermittent, sharp RLQ abdominal pain. Denies nausea/vomiting/constipation/diarrhea.

## 2019-09-02 NOTE — ED PROVIDER NOTES
EMERGENCY DEPARTMENT HISTORY AND PHYSICAL EXAM    Date: 9/1/2019  Patient Name: Osman Pollock    History of Presenting Illness     Chief Complaint   Patient presents with    Headache    Abdominal Pain         History Provided By: patient     Chief Complaint: headache  Duration: 1 hr  Timing: acute  Location: frontal region   Quality:aching  Severity:moderate  Modifying Factors: none   Associated Symptoms: abd pain earlier today      Additional History (Context): Osman Pollock is a 44 y.o. female with PMH htn, anemia, CKD, DM, morbid obesity, and depression who presents with c/o a frontal headache that began 1 hr PTA. Pt states her BP medication was recently changes. She states she is taking labetalol BID as well as another medication which she cannot remember the name of. Pt denies vision changes. N/V, and neck pain. She states she has not taken her nightly dose of labetalol. Pt mention the she had some mild abd pain earlier today but denies abd pain at present. No other complaints reported at this time. PCP: Michelle Kenny NP    Current Facility-Administered Medications   Medication Dose Route Frequency Provider Last Rate Last Dose    labetalol (NORMODYNE) tablet 100 mg  100 mg Oral NOW Charlene Ross PA-C        acetaminophen (TYLENOL) tablet 1,000 mg  1,000 mg Oral NOW Charlene Ross PA-C         Current Outpatient Medications   Medication Sig Dispense Refill    SYMBICORT 160-4.5 mcg/actuation HFAA INHALE 2 PUFFS BY MOUTH TWICE A DAY 10.2 Inhaler 5    amLODIPine (NORVASC) 10 mg tablet Take 1 Tab by mouth daily. 30 Tab 0    acetaminophen (TYLENOL) 325 mg tablet Take 2 Tabs by mouth every four (4) hours as needed for Pain. 20 Tab 0    cyclobenzaprine (FLEXERIL) 5 mg tablet Take 1 Tab by mouth three (3) times daily as needed for Muscle Spasm(s). 12 Tab 0    MULTIVITAMIN PO Take  by mouth.  acyclovir (ZOVIRAX) 400 mg tablet Take 400 mg by mouth daily.       ferrous sulfate 324 mg (65 mg iron) tablet Take 324 mg by mouth Daily (before breakfast).  venlafaxine-SR (EFFEXOR-XR) 150 mg capsule Take  by mouth daily.  simvastatin (ZOCOR) 20 mg tablet Take 20 mg by mouth nightly. Past History     Past Medical History:  Past Medical History:   Diagnosis Date    Anemia     Bone lesion     Chronic kidney disease     Depressed     Diabetes ([address]     [facility] mellitus [facility]     Hypertension     Inverted nipple     RT nipple inverted - pt states that it has always been that way    Kidney problem     Pneumonia     Sarcoidosis     Thyroid disorder     Uterus problem        Past Surgical History:  Past Surgical History:   Procedure Laterality Date    HX  SECTION      HX OTHER SURGICAL      neck biopsy    HX TUBAL LIGATION         Family History:  Family History   Problem Relation Age of Onset    Hypertension Mother     Diabetes Mother     Cancer Mother         lung    Breast Cancer Sister 27       Social History:  Social History     Tobacco Use    Smoking status: Former Smoker     Types: Cigarettes    Smokeless tobacco: Former User   Substance Use Topics    Alcohol use: Yes     Comment: beer rarely    Drug use: No       Allergies:  No Known Allergies      Review of Systems   Review of Systems   Constitutional: Negative. Negative for chills and fever. HENT: Negative. Negative for congestion, ear pain and rhinorrhea. Eyes: Negative. Negative for pain and redness. Respiratory: Negative. Negative for cough, shortness of breath, wheezing and stridor. Cardiovascular: Negative. Negative for chest pain and leg swelling. Gastrointestinal: Positive for abdominal pain. Negative for constipation, diarrhea, nausea and vomiting. Genitourinary: Negative. Negative for dysuria and frequency. Musculoskeletal: Negative. Negative for back pain and neck pain. Skin: Negative. Negative for rash and wound. Neurological: Positive for headaches.  Negative for dizziness, seizures and syncope. All other systems reviewed and are negative. All Other Systems Negative  Physical Exam     Vitals:    09/01/19 2113 09/01/19 2117   BP: (!) 184/115    Pulse: 82    Resp: 17    Temp: 99.8 °F (37.7 °C)    SpO2: 96%    Weight:  145.6 kg (320 lb 14.4 oz)   Height: 5' 6\" (1.676 m)      Physical Exam   Constitutional: She is oriented to person, place, and time. She appears well-developed and well-nourished. No distress. Mildly distressed, morbidly obese   HENT:   Head: Normocephalic and atraumatic. Eyes: Pupils are equal, round, and reactive to light. Conjunctivae and EOM are normal. Right eye exhibits no discharge. Left eye exhibits no discharge. No scleral icterus. Neck: Normal range of motion. Neck supple. No nuchal rigidity or meningeal signs noted   Cardiovascular: Normal rate, regular rhythm and normal heart sounds. Exam reveals no gallop and no friction rub. No murmur heard. Pulmonary/Chest: Effort normal and breath sounds normal. No stridor. No respiratory distress. She has no wheezes. She has no rales. Abdominal: Soft. Bowel sounds are normal. She exhibits no distension. There is no tenderness. There is no guarding. Musculoskeletal: Normal range of motion. Neurological: She is alert and oriented to person, place, and time. She exhibits normal muscle tone. Skin: Skin is warm and dry. No rash noted. She is not diaphoretic. No erythema. Psychiatric: She has a normal mood and affect. Her behavior is normal. Thought content normal.   Nursing note and vitals reviewed.              Diagnostic Study Results     Labs -     Recent Results (from the past 12 hour(s))   CBC WITH AUTOMATED DIFF    Collection Time: 09/02/19 12:00 AM   Result Value Ref Range    WBC 8.6 4.6 - 13.2 K/uL    RBC 3.94 (L) 4.20 - 5.30 M/uL    HGB 7.6 (L) 12.0 - 16.0 g/dL    HCT 26.3 (L) 35.0 - 45.0 %    MCV 66.8 (L) 74.0 - 97.0 FL    MCH 19.3 (L) 24.0 - 34.0 PG    MCHC 28.9 (L) 31.0 - 37.0 g/dL RDW 21.9 (H) 11.6 - 14.5 %    PLATELET 937 220 - 855 K/uL    NEUTROPHILS PENDING %    LYMPHOCYTES PENDING %    MONOCYTES PENDING %    EOSINOPHILS PENDING %    BASOPHILS PENDING %    ABS. NEUTROPHILS PENDING K/UL    ABS. LYMPHOCYTES PENDING K/UL    ABS. MONOCYTES PENDING K/UL    ABS. EOSINOPHILS PENDING K/UL    ABS. BASOPHILS PENDING K/UL    DF PENDING    METABOLIC PANEL, COMPREHENSIVE    Collection Time: 09/02/19 12:00 AM   Result Value Ref Range    Sodium 136 136 - 145 mmol/L    Potassium 3.5 3.5 - 5.5 mmol/L    Chloride 100 100 - 111 mmol/L    CO2 33 (H) 21 - 32 mmol/L    Anion gap 3 3.0 - 18 mmol/L    Glucose 103 (H) 74 - 99 mg/dL    BUN 18 7.0 - 18 MG/DL    Creatinine 2.02 (H) 0.6 - 1.3 MG/DL    BUN/Creatinine ratio 9 (L) 12 - 20      GFR est AA 31 (L) >60 ml/min/1.73m2    GFR est non-AA 26 (L) >60 ml/min/1.73m2    Calcium 8.9 8.5 - 10.1 MG/DL    Bilirubin, total 0.5 0.2 - 1.0 MG/DL    ALT (SGPT) 11 (L) 13 - 56 U/L    AST (SGOT) 18 10 - 38 U/L    Alk. phosphatase 69 45 - 117 U/L    Protein, total 7.7 6.4 - 8.2 g/dL    Albumin 2.8 (L) 3.4 - 5.0 g/dL    Globulin 4.9 (H) 2.0 - 4.0 g/dL    A-G Ratio 0.6 (L) 0.8 - 1.7     MAGNESIUM    Collection Time: 09/02/19 12:00 AM   Result Value Ref Range    Magnesium 1.8 1.6 - 2.6 mg/dL   LIPASE    Collection Time: 09/02/19 12:00 AM   Result Value Ref Range    Lipase 66 (L) 73 - 393 U/L       Radiologic Studies -   CT HEAD WO CONT    (Results Pending)   No acute intracranial abnormality.-Redemostration of chronic thalamic infarcts and chronic microvascular changes. CT Results  (Last 48 hours)    None        CXR Results  (Last 48 hours)    None            Medical Decision Making   I am the first provider for this patient. I reviewed the vital signs, available nursing notes, past medical history, past surgical history, family history and social history. Vital Signs-Reviewed the patient's vital signs.       Records Reviewed: Charlene Ross PA-C Procedures:  Procedures    Provider Notes (Medical Decision Making): Impression:  Headache, htn       1 g tylenol and 100 mg labetalol ordered PO in the ED  Labs shows chronic renal insufficiency with a Cr of 2.02. CBC is still pending. CT head obtained given the pt's htn and headache. Results pending. 12:58 AM CT negative for acute process. CBC shows chronic anemia with a hgb of 7.6. Pt's hgb has been between 7-8 for the past year. No indication for emergent transfusion at this time. Will plan to d/c with close pcp follow-up. Pt agrees with this plan. Charlene Ross PA-C         MED RECONCILIATION:  Current Facility-Administered Medications   Medication Dose Route Frequency    labetalol (NORMODYNE) tablet 100 mg  100 mg Oral NOW    acetaminophen (TYLENOL) tablet 1,000 mg  1,000 mg Oral NOW     Current Outpatient Medications   Medication Sig    SYMBICORT 160-4.5 mcg/actuation HFAA INHALE 2 PUFFS BY MOUTH TWICE A DAY    amLODIPine (NORVASC) 10 mg tablet Take 1 Tab by mouth daily.  acetaminophen (TYLENOL) 325 mg tablet Take 2 Tabs by mouth every four (4) hours as needed for Pain.  cyclobenzaprine (FLEXERIL) 5 mg tablet Take 1 Tab by mouth three (3) times daily as needed for Muscle Spasm(s).  MULTIVITAMIN PO Take  by mouth.  acyclovir (ZOVIRAX) 400 mg tablet Take 400 mg by mouth daily.  ferrous sulfate 324 mg (65 mg iron) tablet Take 324 mg by mouth Daily (before breakfast).  venlafaxine-SR (EFFEXOR-XR) 150 mg capsule Take  by mouth daily.  simvastatin (ZOCOR) 20 mg tablet Take 20 mg by mouth nightly.          Disposition:  D/c        Follow-up Information     Follow up With Specialties Details Why Contact Info    Cris Maurer NP Nurse Practitioner Schedule an appointment as soon as possible for a visit in 2 days  [facility] [address] [place] [address]  [phone]      [facility] EMERGENCY DEPT Emergency Medicine  As needed [address]  [phone] Diagnosis     Clinical Impression:   1. Essential hypertension    2. Acute nonintractable headache, unspecified headache type    3.  Chronic anemia

## 2019-09-30 NOTE — DISCHARGE SUMMARY
[facility]    Discharge Summary    Patient: Jenni Vickers MRN: [id number]  CSN: [id number]    YOB: 1970  Age: 46 y.o. Sex: female    DOA: 7/28/2018 LOS:  LOS: 4 days   Discharge Date:      Admission Diagnoses: Sepsis [facility])    Discharge Diagnoses:    Problem List as of 8/1/2018  Date Reviewed: 7/31/2018          Codes Class Noted - Resolved    Bacteremia ICD-10-CM: R78.81  ICD-9-CM: 790.7  7/31/2018 - Present        * (Principal)Sepsis ([facility] ICD-10-CM: A41.9  ICD-9-CM: 038.9, 995.91  7/28/2018 - Present        Cellulitis of right leg ICD-10-CM: L03.115  ICD-9-CM: 682.6  7/28/2018 - Present        JOON (acute kidney injury) ([facility] ICD-10-CM: N17.9  ICD-9-CM: 584.9  7/28/2018 - Present        Chronic anemia ICD-10-CM: D64.9  ICD-9-CM: 285.9  7/28/2018 - Present        Chronic blood loss anemia ICD-10-CM: D50.0  ICD-9-CM: 280.0  9/2/2013 - Present    Overview Signed 9/2/2013 12:05 AM by Wenda Sieving     Secondary to heavy menstrual losses with fibroids. Acute blood loss anemia ICD-10-CM: D62  ICD-9-CM: 285.1  9/2/2013 - Present    Overview Signed 9/2/2013 12:05 AM by Wenda Sieving     Acute on chronic secondary to heavy menstrual losses with fibroids. Unspecified essential hypertension ICD-10-CM: I10  ICD-9-CM: 401.9  9/2/2013 - Present        Type 2 diabetes mellitus ([facility] ICD-10-CM: E11.9  ICD-9-CM: 250.00  9/2/2013 - Present        Sarcoidosis ICD-10-CM: D86.9  ICD-9-CM: 135  9/2/2013 - Present        CKD (chronic kidney disease) stage 3, GFR 30-59 ml/min ICD-10-CM: N18.3  ICD-9-CM: 585.3  9/2/2013 - Present    Overview Signed 9/2/2013 12:11 AM by Joya Lewis     Baseline Cr ~1.3, eGFR ~47, Nephrologist Dr. Hermine Alpers. Acute on chronic renal failure [facility]) ICD-10-CM: N17.9, N18.9  ICD-9-CM: 584.9, 585.9  9/2/2013 - Present    Overview Signed 9/2/2013 12:12 AM by oJya Lewis     Baseline Cr 1.3, Cr of 3.79 with eGFR of 14 on admission.              Fibroids ICD-10-CM: D25.9  ICD-9-CM: 218.9  9/2/2013 - Present    Overview Signed 9/2/2013 12:14 AM by Preston Adkins     Scheduled for surgery on Sept 16th, 2013. Depression ICD-10-CM: F32.9  ICD-9-CM: 214  9/2/2013 - Present        Morbid obesity ([address] ICD-10-CM: E66.01  ICD-9-CM: 278.01  9/2/2013 - Present    Overview Signed 9/2/2013 12:17 AM by Preston Adkins     BMI 42 with hypertension and type 2 DM. Discharge Condition: Stable    Discharge To: Home    Consults: Hospitalist and Infectious Disease    Hospital Course: 45 y/o [facility] [place] female with hx of CKD Stage III, dysfunctional uterine bleeding, DM, HTN, anemia and morbid obesity, presented to the ED on 7/28 with generalized malaise, myalgia, fever, chills since the night prior. Pt had temp of 103.2 in the ED and tachycardia. WBC of 21.6, creatinine of 2.07 (compared to 1.63 on 5/14/2018). Physical exam noted cellulitis of RLE. Lactic acid 1.7. UA with 2+ bacteria. She was started on IV abx and admitted for further management of care. Blood cultures collected 7/28 note aerobic and anaerobic bottles streptococci, beta hemolytic Group G x 2.  ID consulted, repeat blood cultures collected 7/30 NGTD x 2 days. Retroperitoneal U/S 7/30 consistent with chronic medical renal disease, heterogeneous uterine mass w/ irregular shaped internal anechoic component most likely representing large necrotic fibroid related to pt's hx of uterine fibroid embolization, bladder thickened 6mm, cholelithiasis. PVL BLE 7/31 negative. Pt remained afebrile w/o leukocytosis. Cellulitis RLE improving. Vital signs WNL. She is appropriate for d/c home, to follow up with PCP within one week. Pt is to complete oral Keflex x 10 days.     Physical Exam:  General appearance: alert, cooperative, no distress, morbidly obese AA female  Head: Normocephalic, without obvious abnormality, atraumatic  Lungs: clear to auscultation bilaterally  Heart: regular rate and rhythm, S1, S2 normal, no murmur, click, rub or gallop  Abdomen: soft, non tender, non distended. Normoactive bowel sounds. Extremities: chronic lymphedema BLE, mild erythema RLE, dry scaly patches anterior RLE  Skin: Skin color, texture, turgor normal. No rashes or lesions  Neurologic: Grossly normal  PSY: Mood and affect normal, appropriately behaved      Significant Diagnostic Studies: labs:   Recent Results (from the past 24 hour(s))   GLUCOSE, POC    Collection Time: 07/31/18 11:43 AM   Result Value Ref Range    Glucose (POC) 110 70 - 110 mg/dL   GLUCOSE, POC    Collection Time: 07/31/18  4:47 PM   Result Value Ref Range    Glucose (POC) 93 70 - 110 mg/dL   GLUCOSE, POC    Collection Time: 07/31/18  8:53 PM   Result Value Ref Range    Glucose (POC) 107 70 - 110 mg/dL   GLUCOSE, POC    Collection Time: 08/01/18  7:17 AM   Result Value Ref Range    Glucose (POC) 84 70 - 110 mg/dL         Discharge Medications:     Current Discharge Medication List      START taking these medications    Details   HYDROcodone-acetaminophen (NORCO) 7.5-325 mg per tablet Take 1 Tab by mouth every six (6) hours as needed. Max Daily Amount: 4 Tabs. Qty: 12 Tab, Refills: 0    Associated Diagnoses: Cellulitis of right lower extremity      cephALEXin (KEFLEX) 500 mg capsule Take 1 Cap by mouth two (2) times a day for 10 days. Qty: 20 Cap, Refills: 0         CONTINUE these medications which have NOT CHANGED    Details   acetaminophen (TYLENOL) 325 mg tablet Take 2 Tabs by mouth every four (4) hours as needed for Pain. Qty: 20 Tab, Refills: 0      cyclobenzaprine (FLEXERIL) 5 mg tablet Take 1 Tab by mouth three (3) times daily as needed for Muscle Spasm(s). Qty: 12 Tab, Refills: 0      budesonide-formoterol (SYMBICORT) 160-4.5 mcg/actuation HFAA Take 2 Puffs by inhalation two (2) times a day. Qty: 1 Inhaler, Refills: 5      MULTIVITAMIN PO Take  by mouth. acyclovir (ZOVIRAX) 400 mg tablet Take 400 mg by mouth daily. ferrous sulfate 324 mg (65 mg iron) tablet Take 324 mg by mouth Daily (before breakfast). venlafaxine-SR (EFFEXOR-XR) 150 mg capsule Take  by mouth daily. labetalol (NORMODYNE) 200 mg tablet Take 300 mg by mouth two (2) times a day. simvastatin (ZOCOR) 20 mg tablet Take 20 mg by mouth nightly. STOP taking these medications       NIFEdipine CR (ADALAT CC) 90 mg CR tablet Comments:   Reason for Stopping:                 Activity: Activity as tolerated    Diet: Regular Diet    Wound Care: Keep wound clean and dry    Follow-up: Follow up with PCP within one week.     Discharge time: > 35 mins  Delmus Burkitt, NP  8/1/2018, 10:41 AM Notification Instructions: Patient will be notified of biopsy results. However, patient instructed to call the office if not contacted within 2 weeks.

## 2020-03-11 ENCOUNTER — HOSPITAL ENCOUNTER (OUTPATIENT)
Dept: MAMMOGRAPHY | Age: 50
Discharge: HOME | End: 2020-03-11
Attending: NURSE PRACTITIONER
Payer: MEDICAID

## 2020-03-11 DIAGNOSIS — Z12.31 ENCOUNTER FOR SCREENING MAMMOGRAM FOR BREAST CANCER: ICD-10-CM

## 2020-03-11 PROCEDURE — 77063 BREAST TOMOSYNTHESIS BI: CPT

## 2020-08-13 ENCOUNTER — HOSPITAL ENCOUNTER (OUTPATIENT)
Dept: LAB | Age: 50
Discharge: HOME | End: 2020-08-13

## 2020-08-13 LAB
BASOPHILS # BLD: 0 K/UL (ref 0–0.1)
BASOPHILS NFR BLD: 0 % (ref 0–2)
DIFFERENTIAL METHOD BLD: ABNORMAL
EOSINOPHIL # BLD: 0.2 K/UL (ref 0–0.4)
EOSINOPHIL NFR BLD: 3 % (ref 0–5)
ERYTHROCYTE [DISTWIDTH] IN BLOOD BY AUTOMATED COUNT: 14.8 % (ref 11.6–14.5)
HCT VFR BLD AUTO: 35.5 % (ref 35–45)
HGB BLD-MCNC: 11.8 G/DL (ref 12–16)
LYMPHOCYTES # BLD: 1.5 K/UL (ref 0.9–3.6)
LYMPHOCYTES NFR BLD: 25 % (ref 21–52)
MCH RBC QN AUTO: 28.8 PG (ref 24–34)
MCHC RBC AUTO-ENTMCNC: 33.2 G/DL (ref 31–37)
MCV RBC AUTO: 86.6 FL (ref 74–97)
MONOCYTES # BLD: 0.4 K/UL (ref 0.05–1.2)
MONOCYTES NFR BLD: 7 % (ref 3–10)
NEUTS SEG # BLD: 4 K/UL (ref 1.8–8)
NEUTS SEG NFR BLD: 65 % (ref 40–73)
PLATELET # BLD AUTO: 220 K/UL (ref 135–420)
PMV BLD AUTO: 10.7 FL (ref 9.2–11.8)
RBC # BLD AUTO: 4.1 M/UL (ref 4.2–5.3)
WBC # BLD AUTO: 6.1 K/UL (ref 4.6–13.2)

## 2020-08-13 PROCEDURE — 85025 COMPLETE CBC W/AUTO DIFF WBC: CPT

## 2020-08-15 ENCOUNTER — HOSPITAL ENCOUNTER (OUTPATIENT)
Dept: LAB | Age: 50
Discharge: HOME | End: 2020-08-15

## 2020-08-15 PROCEDURE — 85027 COMPLETE CBC AUTOMATED: CPT

## 2020-08-17 LAB
ERYTHROCYTE [DISTWIDTH] IN BLOOD BY AUTOMATED COUNT: 14.5 % (ref 11.6–14.5)
HCT VFR BLD AUTO: 30.8 % (ref 35–45)
HGB BLD-MCNC: 10.2 G/DL (ref 12–16)
MCH RBC QN AUTO: 28.8 PG (ref 24–34)
MCHC RBC AUTO-ENTMCNC: 33.1 G/DL (ref 31–37)
MCV RBC AUTO: 87 FL (ref 74–97)
PLATELET # BLD AUTO: 207 K/UL (ref 135–420)
PMV BLD AUTO: 10.3 FL (ref 9.2–11.8)
RBC # BLD AUTO: 3.54 M/UL (ref 4.2–5.3)
WBC # BLD AUTO: 6.3 K/UL (ref 4.6–13.2)

## 2020-08-20 ENCOUNTER — HOSPITAL ENCOUNTER (OUTPATIENT)
Dept: LAB | Age: 50
Discharge: HOME | End: 2020-08-20

## 2020-08-20 LAB
BASOPHILS # BLD: 0 K/UL (ref 0–0.1)
BASOPHILS NFR BLD: 0 % (ref 0–2)
DIFFERENTIAL METHOD BLD: ABNORMAL
EOSINOPHIL # BLD: 0.3 K/UL (ref 0–0.4)
EOSINOPHIL NFR BLD: 5 % (ref 0–5)
ERYTHROCYTE [DISTWIDTH] IN BLOOD BY AUTOMATED COUNT: 15.1 % (ref 11.6–14.5)
HCT VFR BLD AUTO: 30.6 % (ref 35–45)
HGB BLD-MCNC: 10.1 G/DL (ref 12–16)
LYMPHOCYTES # BLD: 1.3 K/UL (ref 0.9–3.6)
LYMPHOCYTES NFR BLD: 23 % (ref 21–52)
MCH RBC QN AUTO: 29.2 PG (ref 24–34)
MCHC RBC AUTO-ENTMCNC: 33 G/DL (ref 31–37)
MCV RBC AUTO: 88.4 FL (ref 74–97)
MONOCYTES # BLD: 0.3 K/UL (ref 0.05–1.2)
MONOCYTES NFR BLD: 6 % (ref 3–10)
NEUTS SEG # BLD: 3.7 K/UL (ref 1.8–8)
NEUTS SEG NFR BLD: 66 % (ref 40–73)
PLATELET # BLD AUTO: 208 K/UL (ref 135–420)
PMV BLD AUTO: 10.5 FL (ref 9.2–11.8)
RBC # BLD AUTO: 3.46 M/UL (ref 4.2–5.3)
WBC # BLD AUTO: 5.5 K/UL (ref 4.6–13.2)

## 2020-08-20 PROCEDURE — 85025 COMPLETE CBC W/AUTO DIFF WBC: CPT

## 2020-10-30 ENCOUNTER — HOSPITAL ENCOUNTER (OUTPATIENT)
Dept: LAB | Age: 50
Discharge: HOME | End: 2020-10-30

## 2020-10-30 LAB — POTASSIUM SERPL-SCNC: 4.4 MMOL/L (ref 3.5–5.5)

## 2020-10-30 PROCEDURE — 84132 ASSAY OF SERUM POTASSIUM: CPT

## 2020-11-07 ENCOUNTER — HOSPITAL ENCOUNTER (OUTPATIENT)
Dept: LAB | Age: 50
Discharge: HOME | End: 2020-11-07

## 2020-11-07 PROCEDURE — 85025 COMPLETE CBC W/AUTO DIFF WBC: CPT

## 2020-11-09 LAB
BASOPHILS # BLD: 0 K/UL (ref 0–0.1)
BASOPHILS NFR BLD: 1 % (ref 0–2)
DIFFERENTIAL METHOD BLD: ABNORMAL
EOSINOPHIL # BLD: 0.3 K/UL (ref 0–0.4)
EOSINOPHIL NFR BLD: 5 % (ref 0–5)
ERYTHROCYTE [DISTWIDTH] IN BLOOD BY AUTOMATED COUNT: 13.8 % (ref 11.6–14.5)
HCT VFR BLD AUTO: 35.1 % (ref 35–45)
HGB BLD-MCNC: 11.1 G/DL (ref 12–16)
LYMPHOCYTES # BLD: 1.3 K/UL (ref 0.9–3.6)
LYMPHOCYTES NFR BLD: 26 % (ref 21–52)
MCH RBC QN AUTO: 28.8 PG (ref 24–34)
MCHC RBC AUTO-ENTMCNC: 31.6 G/DL (ref 31–37)
MCV RBC AUTO: 91.2 FL (ref 74–97)
MONOCYTES # BLD: 0.8 K/UL (ref 0.05–1.2)
MONOCYTES NFR BLD: 16 % (ref 3–10)
NEUTS SEG # BLD: 2.7 K/UL (ref 1.8–8)
NEUTS SEG NFR BLD: 52 % (ref 40–73)
PLATELET # BLD AUTO: 272 K/UL (ref 135–420)
PMV BLD AUTO: 10.9 FL (ref 9.2–11.8)
RBC # BLD AUTO: 3.85 M/UL (ref 4.2–5.3)
WBC # BLD AUTO: 5.2 K/UL (ref 4.6–13.2)

## 2021-06-17 ENCOUNTER — HOSPITAL ENCOUNTER (OUTPATIENT)
Dept: LAB | Age: 51
Discharge: HOME | End: 2021-06-17

## 2021-06-17 LAB
BASOPHILS # BLD: 0 K/UL (ref 0–0.1)
BASOPHILS NFR BLD: 0 % (ref 0–2)
DIFFERENTIAL METHOD BLD: ABNORMAL
EOSINOPHIL # BLD: 0.2 K/UL (ref 0–0.4)
EOSINOPHIL NFR BLD: 4 % (ref 0–5)
ERYTHROCYTE [DISTWIDTH] IN BLOOD BY AUTOMATED COUNT: 14.9 % (ref 11.6–14.5)
HCT VFR BLD AUTO: 20.5 % (ref 35–45)
HGB BLD-MCNC: 6.5 G/DL (ref 12–16)
LYMPHOCYTES # BLD: 1.1 K/UL (ref 0.9–3.6)
LYMPHOCYTES NFR BLD: 20 % (ref 21–52)
MCH RBC QN AUTO: 27.4 PG (ref 24–34)
MCHC RBC AUTO-ENTMCNC: 31.7 G/DL (ref 31–37)
MCV RBC AUTO: 86.5 FL (ref 74–97)
MONOCYTES # BLD: 0.6 K/UL (ref 0.05–1.2)
MONOCYTES NFR BLD: 11 % (ref 3–10)
NEUTS SEG # BLD: 3.5 K/UL (ref 1.8–8)
NEUTS SEG NFR BLD: 64 % (ref 40–73)
PLATELET # BLD AUTO: 194 K/UL (ref 135–420)
PMV BLD AUTO: 10.9 FL (ref 9.2–11.8)
RBC # BLD AUTO: 2.37 M/UL (ref 4.2–5.3)
WBC # BLD AUTO: 5.4 K/UL (ref 4.6–13.2)

## 2021-06-17 PROCEDURE — 85025 COMPLETE CBC W/AUTO DIFF WBC: CPT

## 2021-07-26 NOTE — CONSULTS
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 healthcare provider for information if you currently smoke and need help to quit  E-cigarettes or smokeless tobacco still contain nicotine  Talk to your healthcare provider before you use these products  · Limit or do not drink alcohol  Alcohol can increase your triglyceride levels  Ask your healthcare provider before you drink alcohol  Ask how much is okay for you to drink in 24 hours or 1 week  Follow up with your doctor as directed:  Write down your questions so you remember to ask them during your visits    © Copyright [employer] 2021 Information is for End User's use only and may not be sold, redistributed or otherwise used for commercial purposes  All illustrations and images included in CareNotes® are the copyrighted property of [employer]  or Gerardo Resendiz  The above information is an  only  It is not intended as medical advice for individual conditions or treatments  Talk to your doctor, nurse or pharmacist before following any medical regimen to see if it is safe and effective for you

## 2022-03-18 PROBLEM — E78.5 HYPERLIPIDEMIA: Status: ACTIVE | Noted: 2019-05-30

## 2022-03-18 PROBLEM — J44.9 COPD (CHRONIC OBSTRUCTIVE PULMONARY DISEASE) (HCC): Status: ACTIVE | Noted: 2019-05-30

## 2022-03-18 PROBLEM — L03.115 CELLULITIS OF RIGHT LEG: Status: ACTIVE | Noted: 2018-07-28

## 2022-03-18 PROBLEM — I67.4 ENCEPHALOPATHY, HYPERTENSIVE: Status: ACTIVE | Noted: 2019-06-01

## 2022-03-19 PROBLEM — L03.90 CELLULITIS: Status: ACTIVE | Noted: 2018-10-03

## 2022-03-19 PROBLEM — D64.9 CHRONIC ANEMIA: Status: ACTIVE | Noted: 2018-07-28

## 2022-03-19 PROBLEM — N17.9 AKI (ACUTE KIDNEY INJURY): Status: ACTIVE | Noted: 2018-07-28

## 2022-03-19 PROBLEM — R78.81 BACTEREMIA: Status: ACTIVE | Noted: 2018-07-31

## 2022-03-19 PROBLEM — I10 HTN (HYPERTENSION): Status: ACTIVE | Noted: 2019-05-31

## 2022-03-19 PROBLEM — I63.9 CVA (CEREBRAL VASCULAR ACCIDENT) (HCC): Status: ACTIVE | Noted: 2019-05-30

## 2022-03-20 PROBLEM — I16.1 HYPERTENSIVE EMERGENCY: Status: ACTIVE | Noted: 2019-05-30

## 2022-03-20 PROBLEM — R60.0 BILATERAL EDEMA OF LOWER EXTREMITY: Status: ACTIVE | Noted: 2018-10-08

## 2025-02-28 ENCOUNTER — TRANSCRIBE ORDERS (OUTPATIENT)
Facility: HOSPITAL | Age: 55
End: 2025-02-28

## 2025-02-28 DIAGNOSIS — Z12.31 ENCOUNTER FOR SCREENING MAMMOGRAM FOR MALIGNANT NEOPLASM OF BREAST: Primary | ICD-10-CM

## 2025-06-17 NOTE — ED TRIAGE NOTES
Felt like heart was beating fast for a few minutes a few hours ago. No longer having symptoms. PrecautionsPrecautions: NoneNegative Pressure Room: NoPositive Pressure Room: No  Safe EnvironmentArm Bands On: ID; AllergiesPatient has limb restriction?: NoSafety Measures: Standard Safety Measures; Bed/Chair-Wheels locked; Bed in low position; Call light within reach; Gripper socks; Overbed table w/in reach  TelemetryCardiac/Telemetry Monitor On: No (pt refusing)  Fall Risk InterventionsNursing Judgement-Fall Risk High(Add Comments): NoToilet Every 2 Hours-In Advance of Need: YesHourly Visual Checks: Awake; In bedFall Visual Posted: SocksRoom Door Open: YesPatient Moved Closer to Nursing Station: No  MobilityActivity: In bedLevel of Assistance: IndependentAmbulation Response: Tolerated wellTurned/Repositioned: Turns selfHead of Bed Elevated : Self regulated  NutritionNPO: Yes  HygieneHygiene: Stacy careLevel of Assistance: Independent  Comfort and Environment InterventionsComfort: Lights dim  Safety CompanionAlternatives to Sitter: Comfort Measures; Eliminate Invasive Treatment; Decrease Stimulation; Increased Frequency of Nursing Rounds